# Patient Record
Sex: MALE | Race: WHITE | Employment: OTHER | URBAN - METROPOLITAN AREA
[De-identification: names, ages, dates, MRNs, and addresses within clinical notes are randomized per-mention and may not be internally consistent; named-entity substitution may affect disease eponyms.]

---

## 2018-05-19 ENCOUNTER — HOSPITAL ENCOUNTER (EMERGENCY)
Age: 58
Discharge: HOME | End: 2018-05-19
Attending: EMERGENCY MEDICINE
Payer: COMMERCIAL

## 2018-05-19 ENCOUNTER — APPOINTMENT (OUTPATIENT)
Dept: CT IMAGING | Age: 58
End: 2018-05-19
Payer: COMMERCIAL

## 2018-05-19 ENCOUNTER — APPOINTMENT (OUTPATIENT)
Dept: GENERAL RADIOLOGY | Age: 58
End: 2018-05-19
Payer: COMMERCIAL

## 2018-05-19 VITALS
WEIGHT: 255 LBS | HEART RATE: 80 BPM | OXYGEN SATURATION: 98 % | DIASTOLIC BLOOD PRESSURE: 68 MMHG | RESPIRATION RATE: 20 BRPM | SYSTOLIC BLOOD PRESSURE: 112 MMHG | BODY MASS INDEX: 36.51 KG/M2 | TEMPERATURE: 98.4 F | HEIGHT: 70 IN

## 2018-05-19 DIAGNOSIS — R10.11 RIGHT UPPER QUADRANT ABDOMINAL PAIN: ICD-10-CM

## 2018-05-19 DIAGNOSIS — K85.90 ACUTE PANCREATITIS, UNSPECIFIED COMPLICATION STATUS, UNSPECIFIED PANCREATITIS TYPE: Primary | ICD-10-CM

## 2018-05-19 DIAGNOSIS — R10.10 PAIN OF UPPER ABDOMEN: ICD-10-CM

## 2018-05-19 LAB
ALBUMIN SERPL-MCNC: 3.9 G/DL (ref 3.5–5.2)
ALP BLD-CCNC: 85 U/L (ref 40–129)
ALT SERPL-CCNC: 17 U/L (ref 0–40)
AMYLASE: 140 U/L (ref 20–100)
ANION GAP SERPL CALCULATED.3IONS-SCNC: 11 MMOL/L (ref 7–16)
AST SERPL-CCNC: 15 U/L (ref 0–39)
BASOPHILS ABSOLUTE: 0.03 E9/L (ref 0–0.2)
BASOPHILS RELATIVE PERCENT: 0.4 % (ref 0–2)
BILIRUB SERPL-MCNC: 0.5 MG/DL (ref 0–1.2)
BILIRUBIN URINE: NEGATIVE
BLOOD, URINE: NEGATIVE
BUN BLDV-MCNC: 14 MG/DL (ref 6–20)
CALCIUM SERPL-MCNC: 8.8 MG/DL (ref 8.6–10.2)
CHLORIDE BLD-SCNC: 100 MMOL/L (ref 98–107)
CLARITY: CLEAR
CO2: 26 MMOL/L (ref 22–29)
COLOR: NORMAL
CREAT SERPL-MCNC: 1 MG/DL (ref 0.7–1.2)
EKG ATRIAL RATE: 82 BPM
EKG P AXIS: 60 DEGREES
EKG P-R INTERVAL: 150 MS
EKG Q-T INTERVAL: 366 MS
EKG QRS DURATION: 92 MS
EKG QTC CALCULATION (BAZETT): 427 MS
EKG R AXIS: 39 DEGREES
EKG T AXIS: 22 DEGREES
EKG VENTRICULAR RATE: 82 BPM
EOSINOPHILS ABSOLUTE: 0.17 E9/L (ref 0.05–0.5)
EOSINOPHILS RELATIVE PERCENT: 2.1 % (ref 0–6)
GFR AFRICAN AMERICAN: >60
GFR NON-AFRICAN AMERICAN: >60 ML/MIN/1.73
GLUCOSE BLD-MCNC: 135 MG/DL (ref 74–109)
GLUCOSE URINE: NEGATIVE MG/DL
HCT VFR BLD CALC: 39.9 % (ref 37–54)
HEMOGLOBIN: 13.1 G/DL (ref 12.5–16.5)
IMMATURE GRANULOCYTES #: 0.01 E9/L
IMMATURE GRANULOCYTES %: 0.1 % (ref 0–5)
KETONES, URINE: NEGATIVE MG/DL
LACTIC ACID: 1.2 MMOL/L (ref 0.5–2.2)
LEUKOCYTE ESTERASE, URINE: NEGATIVE
LIPASE: 464 U/L (ref 13–60)
LYMPHOCYTES ABSOLUTE: 1.53 E9/L (ref 1.5–4)
LYMPHOCYTES RELATIVE PERCENT: 19.3 % (ref 20–42)
MCH RBC QN AUTO: 29.8 PG (ref 26–35)
MCHC RBC AUTO-ENTMCNC: 32.8 % (ref 32–34.5)
MCV RBC AUTO: 90.9 FL (ref 80–99.9)
MONOCYTES ABSOLUTE: 0.65 E9/L (ref 0.1–0.95)
MONOCYTES RELATIVE PERCENT: 8.2 % (ref 2–12)
NEUTROPHILS ABSOLUTE: 5.55 E9/L (ref 1.8–7.3)
NEUTROPHILS RELATIVE PERCENT: 69.9 % (ref 43–80)
NITRITE, URINE: NEGATIVE
PDW BLD-RTO: 13 FL (ref 11.5–15)
PH UA: 5.5 (ref 5–9)
PLATELET # BLD: 224 E9/L (ref 130–450)
PMV BLD AUTO: 9.1 FL (ref 7–12)
POTASSIUM SERPL-SCNC: 4.3 MMOL/L (ref 3.5–5)
PROTEIN UA: NEGATIVE MG/DL
RBC # BLD: 4.39 E12/L (ref 3.8–5.8)
SODIUM BLD-SCNC: 137 MMOL/L (ref 132–146)
SPECIFIC GRAVITY UA: 1.01 (ref 1–1.03)
TOTAL PROTEIN: 6.9 G/DL (ref 6.4–8.3)
TROPONIN: <0.01 NG/ML (ref 0–0.03)
UROBILINOGEN, URINE: 0.2 E.U./DL
WBC # BLD: 7.9 E9/L (ref 4.5–11.5)

## 2018-05-19 PROCEDURE — 80053 COMPREHEN METABOLIC PANEL: CPT

## 2018-05-19 PROCEDURE — 83690 ASSAY OF LIPASE: CPT

## 2018-05-19 PROCEDURE — 82150 ASSAY OF AMYLASE: CPT

## 2018-05-19 PROCEDURE — 99284 EMERGENCY DEPT VISIT MOD MDM: CPT

## 2018-05-19 PROCEDURE — 74177 CT ABD & PELVIS W/CONTRAST: CPT

## 2018-05-19 PROCEDURE — 93005 ELECTROCARDIOGRAM TRACING: CPT | Performed by: STUDENT IN AN ORGANIZED HEALTH CARE EDUCATION/TRAINING PROGRAM

## 2018-05-19 PROCEDURE — 83605 ASSAY OF LACTIC ACID: CPT

## 2018-05-19 PROCEDURE — 6360000004 HC RX CONTRAST MEDICATION: Performed by: EMERGENCY MEDICINE

## 2018-05-19 PROCEDURE — 81003 URINALYSIS AUTO W/O SCOPE: CPT

## 2018-05-19 PROCEDURE — 71045 X-RAY EXAM CHEST 1 VIEW: CPT

## 2018-05-19 PROCEDURE — 6370000000 HC RX 637 (ALT 250 FOR IP): Performed by: STUDENT IN AN ORGANIZED HEALTH CARE EDUCATION/TRAINING PROGRAM

## 2018-05-19 PROCEDURE — 84484 ASSAY OF TROPONIN QUANT: CPT

## 2018-05-19 PROCEDURE — 36415 COLL VENOUS BLD VENIPUNCTURE: CPT

## 2018-05-19 PROCEDURE — 85025 COMPLETE CBC W/AUTO DIFF WBC: CPT

## 2018-05-19 RX ORDER — HYDROCODONE BITARTRATE AND ACETAMINOPHEN 5; 325 MG/1; MG/1
1 TABLET ORAL EVERY 6 HOURS PRN
Qty: 10 TABLET | Refills: 0 | Status: SHIPPED | OUTPATIENT
Start: 2018-05-19 | End: 2018-05-22

## 2018-05-19 RX ORDER — FAMOTIDINE 20 MG/1
20 TABLET, FILM COATED ORAL 2 TIMES DAILY
Qty: 60 TABLET | Refills: 0 | Status: SHIPPED | OUTPATIENT
Start: 2018-05-19 | End: 2018-11-13 | Stop reason: ALTCHOICE

## 2018-05-19 RX ORDER — ESCITALOPRAM OXALATE 5 MG/1
5 TABLET ORAL DAILY
COMMUNITY
End: 2019-01-01 | Stop reason: SDUPTHER

## 2018-05-19 RX ORDER — SUCRALFATE 1 G/1
1 TABLET ORAL 4 TIMES DAILY
Qty: 60 TABLET | Refills: 0 | Status: SHIPPED | OUTPATIENT
Start: 2018-05-19 | End: 2018-11-13 | Stop reason: ALTCHOICE

## 2018-05-19 RX ADMIN — IOPAMIDOL 80 ML: 755 INJECTION, SOLUTION INTRAVENOUS at 06:08

## 2018-05-19 RX ADMIN — Medication 30 ML: at 04:50

## 2018-05-19 ASSESSMENT — ENCOUNTER SYMPTOMS
SHORTNESS OF BREATH: 0
RHINORRHEA: 0
CONSTIPATION: 1
DIARRHEA: 0
BACK PAIN: 0
SORE THROAT: 0
WHEEZING: 0
VOMITING: 0
COUGH: 0
NAUSEA: 0
CHEST TIGHTNESS: 0
ABDOMINAL PAIN: 1

## 2018-05-19 ASSESSMENT — PAIN DESCRIPTION - FREQUENCY: FREQUENCY: CONTINUOUS

## 2018-05-19 ASSESSMENT — PAIN DESCRIPTION - LOCATION
LOCATION: ABDOMEN
LOCATION: ABDOMEN

## 2018-05-19 ASSESSMENT — PAIN DESCRIPTION - PROGRESSION: CLINICAL_PROGRESSION: NOT CHANGED

## 2018-05-19 ASSESSMENT — PAIN SCALES - GENERAL
PAINLEVEL_OUTOF10: 3
PAINLEVEL_OUTOF10: 5

## 2018-05-19 ASSESSMENT — PAIN DESCRIPTION - DESCRIPTORS
DESCRIPTORS: ACHING
DESCRIPTORS: ACHING

## 2018-05-19 ASSESSMENT — PAIN DESCRIPTION - PAIN TYPE
TYPE: ACUTE PAIN
TYPE: ACUTE PAIN

## 2018-05-19 ASSESSMENT — PAIN DESCRIPTION - ORIENTATION: ORIENTATION: MID

## 2018-06-09 ENCOUNTER — HOSPITAL ENCOUNTER (OUTPATIENT)
Dept: ULTRASOUND IMAGING | Age: 58
Discharge: HOME | End: 2018-06-09
Payer: COMMERCIAL

## 2018-06-09 DIAGNOSIS — K85.80 OTHER ACUTE PANCREATITIS, UNSPECIFIED COMPLICATION STATUS: ICD-10-CM

## 2018-06-09 PROCEDURE — 76705 ECHO EXAM OF ABDOMEN: CPT

## 2018-06-25 ENCOUNTER — HOSPITAL ENCOUNTER (OUTPATIENT)
Dept: MRI IMAGING | Age: 58
Discharge: HOME | End: 2018-06-27
Payer: COMMERCIAL

## 2018-06-25 DIAGNOSIS — K85.90 ACUTE PANCREATITIS, UNSPECIFIED COMPLICATION STATUS, UNSPECIFIED PANCREATITIS TYPE: ICD-10-CM

## 2018-06-25 PROCEDURE — 6360000004 HC RX CONTRAST MEDICATION: Performed by: RADIOLOGY

## 2018-06-25 PROCEDURE — A9577 INJ MULTIHANCE: HCPCS | Performed by: RADIOLOGY

## 2018-06-25 PROCEDURE — 74183 MRI ABD W/O CNTR FLWD CNTR: CPT

## 2018-06-25 RX ADMIN — GADOBENATE DIMEGLUMINE 20 ML: 529 INJECTION, SOLUTION INTRAVENOUS at 10:01

## 2018-11-13 ENCOUNTER — HOSPITAL ENCOUNTER (INPATIENT)
Age: 58
LOS: 6 days | Discharge: HOME HEALTH | DRG: 871 | End: 2018-11-19
Attending: EMERGENCY MEDICINE | Admitting: INTERNAL MEDICINE
Payer: COMMERCIAL

## 2018-11-13 ENCOUNTER — APPOINTMENT (OUTPATIENT)
Dept: GENERAL RADIOLOGY | Age: 58
DRG: 871 | End: 2018-11-13
Payer: COMMERCIAL

## 2018-11-13 DIAGNOSIS — R50.81 NEUTROPENIA WITH FEVER (HCC): Primary | ICD-10-CM

## 2018-11-13 DIAGNOSIS — D70.9 NEUTROPENIA WITH FEVER (HCC): Primary | ICD-10-CM

## 2018-11-13 LAB
ALBUMIN SERPL-MCNC: 3.6 G/DL (ref 3.5–5.2)
ALP BLD-CCNC: 143 U/L (ref 40–129)
ALT SERPL-CCNC: 24 U/L (ref 0–40)
ANION GAP SERPL CALCULATED.3IONS-SCNC: 13 MMOL/L (ref 7–16)
AST SERPL-CCNC: 22 U/L (ref 0–39)
BASOPHILS ABSOLUTE: 0.01 E9/L (ref 0–0.2)
BASOPHILS RELATIVE PERCENT: 1 % (ref 0–2)
BILIRUB SERPL-MCNC: 0.5 MG/DL (ref 0–1.2)
BILIRUBIN URINE: NEGATIVE
BLOOD, URINE: NEGATIVE
BUN BLDV-MCNC: 7 MG/DL (ref 6–20)
CALCIUM SERPL-MCNC: 8.8 MG/DL (ref 8.6–10.2)
CHLORIDE BLD-SCNC: 100 MMOL/L (ref 98–107)
CLARITY: CLEAR
CO2: 27 MMOL/L (ref 22–29)
COLOR: YELLOW
CREAT SERPL-MCNC: 0.9 MG/DL (ref 0.7–1.2)
EOSINOPHILS ABSOLUTE: 0.02 E9/L (ref 0.05–0.5)
EOSINOPHILS RELATIVE PERCENT: 2 % (ref 0–6)
GFR AFRICAN AMERICAN: >60
GFR NON-AFRICAN AMERICAN: >60 ML/MIN/1.73
GLUCOSE BLD-MCNC: 91 MG/DL (ref 74–99)
GLUCOSE URINE: NEGATIVE MG/DL
HCT VFR BLD CALC: 36.7 % (ref 37–54)
HEMOGLOBIN: 11.8 G/DL (ref 12.5–16.5)
INFLUENZA A BY PCR: NOT DETECTED
INFLUENZA B BY PCR: NOT DETECTED
KETONES, URINE: NEGATIVE MG/DL
LACTIC ACID: 1.3 MMOL/L (ref 0.5–2.2)
LEUKOCYTE ESTERASE, URINE: NEGATIVE
LYMPHOCYTES ABSOLUTE: 0.54 E9/L (ref 1.5–4)
LYMPHOCYTES RELATIVE PERCENT: 49 % (ref 20–42)
MCH RBC QN AUTO: 27.4 PG (ref 26–35)
MCHC RBC AUTO-ENTMCNC: 32.2 % (ref 32–34.5)
MCV RBC AUTO: 85.3 FL (ref 80–99.9)
MONOCYTES ABSOLUTE: 0.18 E9/L (ref 0.1–0.95)
MONOCYTES RELATIVE PERCENT: 16 % (ref 2–12)
NEUTROPHILS ABSOLUTE: 0.35 E9/L (ref 1.8–7.3)
NEUTROPHILS RELATIVE PERCENT: 32 % (ref 43–80)
NITRITE, URINE: NEGATIVE
PDW BLD-RTO: 13.4 FL (ref 11.5–15)
PH UA: 8 (ref 5–9)
PHOSPHORUS: 1.4 MG/DL (ref 2.5–4.5)
PLATELET # BLD: 236 E9/L (ref 130–450)
PMV BLD AUTO: 8.7 FL (ref 7–12)
POIKILOCYTES: ABNORMAL
POLYCHROMASIA: ABNORMAL
POTASSIUM SERPL-SCNC: 3.4 MMOL/L (ref 3.5–5)
PROTEIN UA: NEGATIVE MG/DL
RBC # BLD: 4.3 E12/L (ref 3.8–5.8)
SODIUM BLD-SCNC: 140 MMOL/L (ref 132–146)
SPECIFIC GRAVITY UA: 1.02 (ref 1–1.03)
TARGET CELLS: ABNORMAL
TOTAL PROTEIN: 6.5 G/DL (ref 6.4–8.3)
URIC ACID, SERUM: 3.2 MG/DL (ref 3.4–7)
UROBILINOGEN, URINE: 0.2 E.U./DL
WBC # BLD: 1.1 E9/L (ref 4.5–11.5)

## 2018-11-13 PROCEDURE — 87040 BLOOD CULTURE FOR BACTERIA: CPT

## 2018-11-13 PROCEDURE — 94761 N-INVAS EAR/PLS OXIMETRY MLT: CPT

## 2018-11-13 PROCEDURE — 6370000000 HC RX 637 (ALT 250 FOR IP): Performed by: INTERNAL MEDICINE

## 2018-11-13 PROCEDURE — 83605 ASSAY OF LACTIC ACID: CPT

## 2018-11-13 PROCEDURE — 36415 COLL VENOUS BLD VENIPUNCTURE: CPT

## 2018-11-13 PROCEDURE — 2580000003 HC RX 258: Performed by: INTERNAL MEDICINE

## 2018-11-13 PROCEDURE — 2580000003 HC RX 258: Performed by: EMERGENCY MEDICINE

## 2018-11-13 PROCEDURE — 6360000002 HC RX W HCPCS: Performed by: EMERGENCY MEDICINE

## 2018-11-13 PROCEDURE — 2500000003 HC RX 250 WO HCPCS: Performed by: INTERNAL MEDICINE

## 2018-11-13 PROCEDURE — 84100 ASSAY OF PHOSPHORUS: CPT

## 2018-11-13 PROCEDURE — 96375 TX/PRO/DX INJ NEW DRUG ADDON: CPT

## 2018-11-13 PROCEDURE — 87502 INFLUENZA DNA AMP PROBE: CPT

## 2018-11-13 PROCEDURE — 80053 COMPREHEN METABOLIC PANEL: CPT

## 2018-11-13 PROCEDURE — 87088 URINE BACTERIA CULTURE: CPT

## 2018-11-13 PROCEDURE — 81003 URINALYSIS AUTO W/O SCOPE: CPT

## 2018-11-13 PROCEDURE — 71045 X-RAY EXAM CHEST 1 VIEW: CPT

## 2018-11-13 PROCEDURE — 85025 COMPLETE CBC W/AUTO DIFF WBC: CPT

## 2018-11-13 PROCEDURE — 84550 ASSAY OF BLOOD/URIC ACID: CPT

## 2018-11-13 PROCEDURE — 99285 EMERGENCY DEPT VISIT HI MDM: CPT

## 2018-11-13 PROCEDURE — 1200000000 HC SEMI PRIVATE

## 2018-11-13 PROCEDURE — 6360000002 HC RX W HCPCS: Performed by: INTERNAL MEDICINE

## 2018-11-13 PROCEDURE — 96374 THER/PROPH/DIAG INJ IV PUSH: CPT

## 2018-11-13 PROCEDURE — 6370000000 HC RX 637 (ALT 250 FOR IP): Performed by: EMERGENCY MEDICINE

## 2018-11-13 RX ORDER — NAPROXEN 500 MG/1
500 TABLET ORAL ONCE
Status: COMPLETED | OUTPATIENT
Start: 2018-11-13 | End: 2018-11-13

## 2018-11-13 RX ORDER — PANTOPRAZOLE SODIUM 40 MG/1
40 TABLET, DELAYED RELEASE ORAL DAILY
Status: ON HOLD | COMMUNITY
End: 2020-01-01 | Stop reason: HOSPADM

## 2018-11-13 RX ORDER — PROMETHAZINE HYDROCHLORIDE 25 MG/1
25 TABLET ORAL EVERY 6 HOURS PRN
Status: ON HOLD | COMMUNITY
End: 2020-01-01 | Stop reason: HOSPADM

## 2018-11-13 RX ORDER — PANTOPRAZOLE SODIUM 40 MG/1
40 TABLET, DELAYED RELEASE ORAL DAILY
Status: DISCONTINUED | OUTPATIENT
Start: 2018-11-14 | End: 2018-11-19 | Stop reason: HOSPADM

## 2018-11-13 RX ORDER — SODIUM CHLORIDE AND POTASSIUM CHLORIDE .9; .15 G/100ML; G/100ML
SOLUTION INTRAVENOUS CONTINUOUS
Status: DISCONTINUED | OUTPATIENT
Start: 2018-11-13 | End: 2018-11-18

## 2018-11-13 RX ORDER — LOPERAMIDE HYDROCHLORIDE 2 MG/1
2 CAPSULE ORAL 4 TIMES DAILY PRN
COMMUNITY
End: 2019-01-01

## 2018-11-13 RX ORDER — PROMETHAZINE HYDROCHLORIDE 25 MG/1
25 TABLET ORAL EVERY 6 HOURS PRN
Status: DISCONTINUED | OUTPATIENT
Start: 2018-11-13 | End: 2018-11-19 | Stop reason: HOSPADM

## 2018-11-13 RX ORDER — ACETAMINOPHEN 325 MG/1
650 TABLET ORAL ONCE
Status: COMPLETED | OUTPATIENT
Start: 2018-11-13 | End: 2018-11-13

## 2018-11-13 RX ORDER — ACETAMINOPHEN 325 MG/1
650 TABLET ORAL EVERY 4 HOURS PRN
Status: DISCONTINUED | OUTPATIENT
Start: 2018-11-13 | End: 2018-11-19 | Stop reason: HOSPADM

## 2018-11-13 RX ORDER — LOPERAMIDE HYDROCHLORIDE 2 MG/1
2 CAPSULE ORAL 4 TIMES DAILY PRN
Status: DISCONTINUED | OUTPATIENT
Start: 2018-11-13 | End: 2018-11-19 | Stop reason: HOSPADM

## 2018-11-13 RX ORDER — SODIUM CHLORIDE 0.9 % (FLUSH) 0.9 %
10 SYRINGE (ML) INJECTION EVERY 12 HOURS SCHEDULED
Status: DISCONTINUED | OUTPATIENT
Start: 2018-11-13 | End: 2018-11-16 | Stop reason: SDUPTHER

## 2018-11-13 RX ORDER — 0.9 % SODIUM CHLORIDE 0.9 %
1000 INTRAVENOUS SOLUTION INTRAVENOUS ONCE
Status: COMPLETED | OUTPATIENT
Start: 2018-11-13 | End: 2018-11-13

## 2018-11-13 RX ORDER — DIPHENOXYLATE HYDROCHLORIDE AND ATROPINE SULFATE 2.5; .025 MG/1; MG/1
1 TABLET ORAL 4 TIMES DAILY PRN
COMMUNITY

## 2018-11-13 RX ORDER — ESCITALOPRAM OXALATE 10 MG/1
5 TABLET ORAL DAILY
Status: DISCONTINUED | OUTPATIENT
Start: 2018-11-14 | End: 2018-11-18

## 2018-11-13 RX ORDER — DIPHENOXYLATE HYDROCHLORIDE AND ATROPINE SULFATE 2.5; .025 MG/1; MG/1
1 TABLET ORAL 4 TIMES DAILY PRN
Status: DISCONTINUED | OUTPATIENT
Start: 2018-11-13 | End: 2018-11-19 | Stop reason: HOSPADM

## 2018-11-13 RX ORDER — LIDOCAINE AND PRILOCAINE 25; 25 MG/G; MG/G
1 CREAM TOPICAL DAILY PRN
Status: ON HOLD | COMMUNITY
End: 2019-01-01 | Stop reason: ALTCHOICE

## 2018-11-13 RX ORDER — SODIUM CHLORIDE 0.9 % (FLUSH) 0.9 %
10 SYRINGE (ML) INJECTION PRN
Status: DISCONTINUED | OUTPATIENT
Start: 2018-11-13 | End: 2018-11-19 | Stop reason: HOSPADM

## 2018-11-13 RX ORDER — ONDANSETRON 2 MG/ML
4 INJECTION INTRAMUSCULAR; INTRAVENOUS EVERY 6 HOURS PRN
Status: DISCONTINUED | OUTPATIENT
Start: 2018-11-13 | End: 2018-11-15

## 2018-11-13 RX ADMIN — NAPROXEN 500 MG: 500 TABLET ORAL at 17:40

## 2018-11-13 RX ADMIN — SODIUM CHLORIDE 1000 ML: 9 INJECTION, SOLUTION INTRAVENOUS at 20:17

## 2018-11-13 RX ADMIN — SODIUM GLYCOLATE 29.98 MMOL: 216 INJECTION, SOLUTION INTRAVENOUS at 22:33

## 2018-11-13 RX ADMIN — CEFEPIME HYDROCHLORIDE 2 G: 2 INJECTION, POWDER, FOR SOLUTION INTRAVENOUS at 18:24

## 2018-11-13 RX ADMIN — SODIUM CHLORIDE 1000 ML: 9 INJECTION, SOLUTION INTRAVENOUS at 15:30

## 2018-11-13 RX ADMIN — ACETAMINOPHEN 650 MG: 325 TABLET, FILM COATED ORAL at 20:16

## 2018-11-13 RX ADMIN — ACETAMINOPHEN 650 MG: 325 TABLET, FILM COATED ORAL at 15:30

## 2018-11-13 RX ADMIN — VANCOMYCIN HYDROCHLORIDE 1500 MG: 10 INJECTION, POWDER, LYOPHILIZED, FOR SOLUTION INTRAVENOUS at 19:22

## 2018-11-13 RX ADMIN — SODIUM CHLORIDE 1000 ML: 9 INJECTION, SOLUTION INTRAVENOUS at 17:40

## 2018-11-13 RX ADMIN — POTASSIUM CHLORIDE AND SODIUM CHLORIDE: 900; 150 INJECTION, SOLUTION INTRAVENOUS at 22:32

## 2018-11-13 RX ADMIN — TAZOBACTAM SODIUM AND PIPERACILLIN SODIUM 3.38 G: 375; 3 INJECTION, SOLUTION INTRAVENOUS at 23:39

## 2018-11-13 RX ADMIN — Medication 10 ML: at 22:47

## 2018-11-13 ASSESSMENT — PAIN SCALES - GENERAL
PAINLEVEL_OUTOF10: 6
PAINLEVEL_OUTOF10: 0
PAINLEVEL_OUTOF10: 5
PAINLEVEL_OUTOF10: 0

## 2018-11-13 ASSESSMENT — ENCOUNTER SYMPTOMS
SHORTNESS OF BREATH: 0
DIARRHEA: 1
COUGH: 0
SORE THROAT: 0
VOMITING: 1
NAUSEA: 1
ABDOMINAL PAIN: 0

## 2018-11-13 ASSESSMENT — PAIN DESCRIPTION - DESCRIPTORS: DESCRIPTORS: HEADACHE

## 2018-11-13 ASSESSMENT — PAIN DESCRIPTION - PAIN TYPE: TYPE: ACUTE PAIN

## 2018-11-13 ASSESSMENT — PAIN DESCRIPTION - LOCATION: LOCATION: HEAD

## 2018-11-13 NOTE — ED PROVIDER NOTES
Patient Vitals for the past 24 hrs:   BP Temp Temp src Pulse Resp SpO2 Height Weight   11/13/18 2007 (!) 99/58 102.2 °F (39 °C) Oral 114 20 94 % - -   11/13/18 1848 115/73 100.6 °F (38.1 °C) - 105 20 96 % - -   11/13/18 1645 121/69 100.8 °F (38.2 °C) Oral 107 20 95 % - -   11/13/18 1410 112/65 102.9 °F (39.4 °C) Oral 104 18 95 % 5' 10\" (1.778 m) 201 lb (91.2 kg)       Oxygen Saturation Interpretation: Normal    ------------------------------------------ PROGRESS NOTES ------------------------------------------  ED Course as of Nov 13 2044   Tue Nov 13, 2018   1519   ATTENDING PROVIDER ATTESTATION:     I have personally performed and/or participated in the history, exam, medical decision making, and procedures and agree with all pertinent clinical information unless otherwise noted. I have also reviewed and agree with the past medical, family and social history unless otherwise noted. I have discussed this patient in detail with the resident and provided the instruction and education regarding the evidence-based evaluation and treatment of [unfilled]  History: patient presents with fever and known pancreatic cancer with leukopenia secondary to chemotherapy. He denies cough, abdominal pain or urinary symptoms. My findings: Marcus Guardado is a 65 y.o. male whom is in no distress. Physical exam reveals slightly dry mucous membranes, heart rate is slightly tachycardic, lungs CTA, abdomen is soft and nontender. No rashes or peripheral edema. Right chest port without surrounding erythema. My plan: Symptomatic and supportive care. Sepsis evaluation. Electronically signed by Solitario Mina DO on 11/13/18 at 3:19 PM        [JS]   5069 Patient reassessed. Patient states his chills have improved. Patient continues to be tachycardic. We'll order another fluid bolus. Awaiting call from oncologist.  Gordo Gordon Discussed case with Dr. Milton Huertas, oncology.  Advises broad-spectrum and admission  [MA]

## 2018-11-14 LAB
ALBUMIN SERPL-MCNC: 2.8 G/DL (ref 3.5–5.2)
ALP BLD-CCNC: 99 U/L (ref 40–129)
ALT SERPL-CCNC: 23 U/L (ref 0–40)
ANION GAP SERPL CALCULATED.3IONS-SCNC: 11 MMOL/L (ref 7–16)
ANISOCYTOSIS: ABNORMAL
AST SERPL-CCNC: 18 U/L (ref 0–39)
BASOPHILS ABSOLUTE: 0.03 E9/L (ref 0–0.2)
BASOPHILS RELATIVE PERCENT: 0.9 % (ref 0–2)
BILIRUB SERPL-MCNC: 0.7 MG/DL (ref 0–1.2)
BUN BLDV-MCNC: 10 MG/DL (ref 6–20)
CALCIUM SERPL-MCNC: 7.8 MG/DL (ref 8.6–10.2)
CHLORIDE BLD-SCNC: 101 MMOL/L (ref 98–107)
CHOLESTEROL, TOTAL: 58 MG/DL (ref 0–199)
CO2: 27 MMOL/L (ref 22–29)
CREAT SERPL-MCNC: 1.1 MG/DL (ref 0.7–1.2)
EOSINOPHILS ABSOLUTE: 0.05 E9/L (ref 0.05–0.5)
EOSINOPHILS RELATIVE PERCENT: 1.7 % (ref 0–6)
FILM ARRAY ADENOVIRUS: NORMAL
FILM ARRAY BORDETELLA PERTUSSIS: NORMAL
FILM ARRAY CHLAMYDOPHILIA PNEUMONIAE: NORMAL
FILM ARRAY CORONAVIRUS 229E: NORMAL
FILM ARRAY CORONAVIRUS HKU1: NORMAL
FILM ARRAY CORONAVIRUS NL63: NORMAL
FILM ARRAY CORONAVIRUS OC43: NORMAL
FILM ARRAY INFLUENZA A VIRUS 09H1: NORMAL
FILM ARRAY INFLUENZA A VIRUS H1: NORMAL
FILM ARRAY INFLUENZA A VIRUS H3: NORMAL
FILM ARRAY INFLUENZA A VIRUS: NORMAL
FILM ARRAY INFLUENZA B: NORMAL
FILM ARRAY METAPNEUMOVIRUS: NORMAL
FILM ARRAY MYCOPLASMA PNEUMONIAE: NORMAL
FILM ARRAY PARAINFLUENZA VIRUS 1: NORMAL
FILM ARRAY PARAINFLUENZA VIRUS 2: NORMAL
FILM ARRAY PARAINFLUENZA VIRUS 3: NORMAL
FILM ARRAY PARAINFLUENZA VIRUS 4: NORMAL
FILM ARRAY RESPIRATORY SYNCITIAL VIRUS: NORMAL
FILM ARRAY RHINOVIRUS/ENTEROVIRUS: NORMAL
GFR AFRICAN AMERICAN: >60
GFR NON-AFRICAN AMERICAN: >60 ML/MIN/1.73
GLUCOSE BLD-MCNC: 88 MG/DL (ref 74–99)
HBA1C MFR BLD: 5 % (ref 4–5.6)
HCT VFR BLD CALC: 31.4 % (ref 37–54)
HDLC SERPL-MCNC: 25 MG/DL
HEMOGLOBIN: 9.9 G/DL (ref 12.5–16.5)
LDL CHOLESTEROL CALCULATED: 23 MG/DL (ref 0–99)
LV EF: 68 %
LVEF MODALITY: NORMAL
LYMPHOCYTES ABSOLUTE: 1.15 E9/L (ref 1.5–4)
LYMPHOCYTES RELATIVE PERCENT: 40.9 % (ref 20–42)
MAGNESIUM: 1.4 MG/DL (ref 1.6–2.6)
MCH RBC QN AUTO: 27.5 PG (ref 26–35)
MCHC RBC AUTO-ENTMCNC: 31.5 % (ref 32–34.5)
MCV RBC AUTO: 87.2 FL (ref 80–99.9)
MONOCYTES ABSOLUTE: 0.31 E9/L (ref 0.1–0.95)
MONOCYTES RELATIVE PERCENT: 11.3 % (ref 2–12)
NEUTROPHILS ABSOLUTE: 1.26 E9/L (ref 1.8–7.3)
NEUTROPHILS RELATIVE PERCENT: 45.2 % (ref 43–80)
OVALOCYTES: ABNORMAL
PDW BLD-RTO: 13.6 FL (ref 11.5–15)
PHOSPHORUS: 4.2 MG/DL (ref 2.5–4.5)
PLATELET # BLD: 217 E9/L (ref 130–450)
PMV BLD AUTO: 8.7 FL (ref 7–12)
POIKILOCYTES: ABNORMAL
POLYCHROMASIA: ABNORMAL
POTASSIUM SERPL-SCNC: 3 MMOL/L (ref 3.5–5)
RBC # BLD: 3.6 E12/L (ref 3.8–5.8)
SODIUM BLD-SCNC: 139 MMOL/L (ref 132–146)
TEAR DROP CELLS: ABNORMAL
TOTAL PROTEIN: 5.3 G/DL (ref 6.4–8.3)
TRIGL SERPL-MCNC: 52 MG/DL (ref 0–149)
TSH SERPL DL<=0.05 MIU/L-ACNC: 1.21 UIU/ML (ref 0.27–4.2)
VLDLC SERPL CALC-MCNC: 10 MG/DL
WBC # BLD: 2.8 E9/L (ref 4.5–11.5)

## 2018-11-14 PROCEDURE — 87040 BLOOD CULTURE FOR BACTERIA: CPT

## 2018-11-14 PROCEDURE — 36415 COLL VENOUS BLD VENIPUNCTURE: CPT

## 2018-11-14 PROCEDURE — 6370000000 HC RX 637 (ALT 250 FOR IP): Performed by: INTERNAL MEDICINE

## 2018-11-14 PROCEDURE — 1200000000 HC SEMI PRIVATE

## 2018-11-14 PROCEDURE — 80061 LIPID PANEL: CPT

## 2018-11-14 PROCEDURE — 87581 M.PNEUMON DNA AMP PROBE: CPT

## 2018-11-14 PROCEDURE — 83036 HEMOGLOBIN GLYCOSYLATED A1C: CPT

## 2018-11-14 PROCEDURE — 93306 TTE W/DOPPLER COMPLETE: CPT

## 2018-11-14 PROCEDURE — 87486 CHLMYD PNEUM DNA AMP PROBE: CPT

## 2018-11-14 PROCEDURE — 6360000002 HC RX W HCPCS: Performed by: INTERNAL MEDICINE

## 2018-11-14 PROCEDURE — 84100 ASSAY OF PHOSPHORUS: CPT

## 2018-11-14 PROCEDURE — 87798 DETECT AGENT NOS DNA AMP: CPT

## 2018-11-14 PROCEDURE — 80053 COMPREHEN METABOLIC PANEL: CPT

## 2018-11-14 PROCEDURE — 2500000003 HC RX 250 WO HCPCS: Performed by: INTERNAL MEDICINE

## 2018-11-14 PROCEDURE — 84443 ASSAY THYROID STIM HORMONE: CPT

## 2018-11-14 PROCEDURE — 83735 ASSAY OF MAGNESIUM: CPT

## 2018-11-14 PROCEDURE — 2580000003 HC RX 258: Performed by: INTERNAL MEDICINE

## 2018-11-14 PROCEDURE — 85025 COMPLETE CBC W/AUTO DIFF WBC: CPT

## 2018-11-14 PROCEDURE — 87633 RESP VIRUS 12-25 TARGETS: CPT

## 2018-11-14 RX ORDER — DIPHENHYDRAMINE HCL 25 MG
25 TABLET ORAL EVERY 6 HOURS PRN
Status: DISCONTINUED | OUTPATIENT
Start: 2018-11-14 | End: 2018-11-19 | Stop reason: HOSPADM

## 2018-11-14 RX ORDER — HEPARIN SODIUM (PORCINE) LOCK FLUSH IV SOLN 100 UNIT/ML 100 UNIT/ML
300 SOLUTION INTRAVENOUS PRN
Status: DISCONTINUED | OUTPATIENT
Start: 2018-11-14 | End: 2018-11-19 | Stop reason: HOSPADM

## 2018-11-14 RX ADMIN — VANCOMYCIN HYDROCHLORIDE 1500 MG: 10 INJECTION, POWDER, LYOPHILIZED, FOR SOLUTION INTRAVENOUS at 19:09

## 2018-11-14 RX ADMIN — VANCOMYCIN HYDROCHLORIDE 1500 MG: 10 INJECTION, POWDER, LYOPHILIZED, FOR SOLUTION INTRAVENOUS at 07:41

## 2018-11-14 RX ADMIN — POTASSIUM CHLORIDE AND SODIUM CHLORIDE: 900; 150 INJECTION, SOLUTION INTRAVENOUS at 12:55

## 2018-11-14 RX ADMIN — DIPHENHYDRAMINE HCL 25 MG: 25 TABLET ORAL at 22:58

## 2018-11-14 RX ADMIN — TAZOBACTAM SODIUM AND PIPERACILLIN SODIUM 3.38 G: 375; 3 INJECTION, SOLUTION INTRAVENOUS at 17:30

## 2018-11-14 RX ADMIN — PANTOPRAZOLE SODIUM 40 MG: 40 TABLET, DELAYED RELEASE ORAL at 09:41

## 2018-11-14 RX ADMIN — Medication 10 ML: at 19:09

## 2018-11-14 RX ADMIN — ENOXAPARIN SODIUM 40 MG: 40 INJECTION SUBCUTANEOUS at 09:40

## 2018-11-14 RX ADMIN — ESCITALOPRAM OXALATE 5 MG: 10 TABLET ORAL at 09:41

## 2018-11-14 RX ADMIN — TAZOBACTAM SODIUM AND PIPERACILLIN SODIUM 3.38 G: 375; 3 INJECTION, SOLUTION INTRAVENOUS at 09:45

## 2018-11-14 RX ADMIN — POTASSIUM CHLORIDE AND SODIUM CHLORIDE: 900; 150 INJECTION, SOLUTION INTRAVENOUS at 22:58

## 2018-11-14 ASSESSMENT — PAIN SCALES - GENERAL
PAINLEVEL_OUTOF10: 0

## 2018-11-14 NOTE — PROGRESS NOTES
Pharmacy Consultation Note  (Antibiotic Dosing and Monitoring)      Pharmacy is following for Vancomycin dosing. Allergies:  Rivaroxaban    65 y.o. male    Ht Readings from Last 1 Encounters:   11/13/18 5' 10\" (1.778 m)     Wt Readings from Last 1 Encounters:   11/13/18 206 lb 8 oz (93.7 kg)       Recent Labs      11/13/18   1525   WBC  1.1*       Recent Labs      11/13/18   1525   BUN  7   CREATININE  0.9       Estimated Creatinine Clearance: 105 mL/min (based on SCr of 0.9 mg/dL).     No intake or output data in the 24 hours ending 11/13/18 2159    Cultures:  available culture and sensitivity results were reviewed in [facility]      Assessment:  · Consulted by Dr. Katherine Ordonez to dose/monitor vancomycin  · Estimated CrCl = 105 mL/min  · Goal trough level = 15-20 mcg/mL    Plan:  · Will initiate vancomycin at a dose of 1500 mg every 12 hours  · Monitor renal function   · Clinical pharmacy will follow up and complete full consult note      Denice Roberts 11/13/2018 9:59 PM

## 2018-11-14 NOTE — H&P
the cavoatrial junction. . LUNGS: Clear with no areas of consolidation. PLEURA: No effusions or pneumothorax. LUNG VOLUMES: Satisfactory inspirator effort. MEDIASTINAL STRUCTURES: No lymphadenopathy. Normal aortic contour. HEART SIZE: Normal. BONES AND SOFT TISSUES: No fracture or soft tissue abnormality. 1. Negative portable chest.            Assessment and Plan  Patient is a 65 y.o. male who presented with fever. The active problem list is as follows:    · Neutropenic fever  · Pancreatic adenocarcinoma s/p Whipple procedure undergoing chemotherapy  · History of positive blood cultures for E. 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 · DVT prophylaxis. lovenox  · Please see orders for further management and care.     [address] [facility] HOWARD PGYII  7:33 PM  11/13/2018

## 2018-11-14 NOTE — PROGRESS NOTES
Pharmacy Consultation Note  (Antibiotic Dosing and Monitoring)    Initial consult date: 18  Consulting physician: Dr. Vero Cool  Drug(s): Vancomycin  Indication: Neutropenic fever    Ht Readings from Last 1 Encounters:   18 5' 10\" (1.778 m)     Wt Readings from Last 1 Encounters:   18 209 lb (94.8 kg)       Pt is a 65 yom admitted with neutropenic fever    Age/  Gender IBW DW  Allergy Information   65 y.o.     male 73 kg 81.7 kg  Rivaroxaban                 Date  WBC BUN/CR UOP Drug/Dose Time   Given Level(s)   (Time) Comments     (#1) 2.8 10/1.1 -- Vancomycin 1,500 mg IV Q12H 0741  <1930>  Patient received dose last night in ED @ 1922   11/15  (#2)     <0730> <0700> Hold dose if trough is >20 mcg/mL     (#3)            (#4)            (#5)            (#6)            (#7)            Estimated Creatinine Clearance: 87 mL/min (based on SCr of 1.1 mg/dL). UOP over the past 24 hours:     No intake or output data in the 24 hours ending 18 0940    Temp max: Temp (24hrs), Av °F (38.3 °C), Min:98.6 °F (37 °C), Max:102.9 °F (39.4 °C)      Antibiotic Regimen:  Antibiotic Dose Date Initiated   Pip/tazo 3.375 g Q8H      Cultures:  available culture and sensitivity results were reviewed in EPIC  Culture Date Result    Blood cx (from [facility])  E. Faecium (pan-s)   Blood cx #1  Pending   Blood cx #2  Pending   Respiratory film array  Pending     Assessment:  · Consulted by Dr. Vero Cool to dose/monitor vancomycin  · Goal trough level:  15-20 mcg/mL  · Pt is a 65 yom with pancreatic adenocarcinoma s/p Whipple procedure, admitted with neutropenic fever. He is undergoing chemotherapy. Broad spectrum antibiotics were initiated and pharmacy was consulted to dose Vancomycin. Pop Khan has a recent history of E. Faecium bacteremia in 2018.   · Serum creatinine today: 1.1; CrCl ~ 87 mL/min; baseline Scr ~ 1.0  · Patient received Vancomycin 1,500 mg IV x 1 in ED last night @

## 2018-11-15 LAB
ALBUMIN SERPL-MCNC: 2.7 G/DL (ref 3.5–5.2)
ALP BLD-CCNC: 98 U/L (ref 40–129)
ALT SERPL-CCNC: 19 U/L (ref 0–40)
ANION GAP SERPL CALCULATED.3IONS-SCNC: 9 MMOL/L (ref 7–16)
AST SERPL-CCNC: 14 U/L (ref 0–39)
BASOPHILS ABSOLUTE: 0.04 E9/L (ref 0–0.2)
BASOPHILS RELATIVE PERCENT: 0.9 % (ref 0–2)
BILIRUB SERPL-MCNC: 0.3 MG/DL (ref 0–1.2)
BUN BLDV-MCNC: 9 MG/DL (ref 6–20)
CALCIUM SERPL-MCNC: 8.1 MG/DL (ref 8.6–10.2)
CHLORIDE BLD-SCNC: 107 MMOL/L (ref 98–107)
CO2: 27 MMOL/L (ref 22–29)
CREAT SERPL-MCNC: 0.8 MG/DL (ref 0.7–1.2)
EOSINOPHILS ABSOLUTE: 0.1 E9/L (ref 0.05–0.5)
EOSINOPHILS RELATIVE PERCENT: 2.6 % (ref 0–6)
GFR AFRICAN AMERICAN: >60
GFR NON-AFRICAN AMERICAN: >60 ML/MIN/1.73
GLUCOSE BLD-MCNC: 87 MG/DL (ref 74–99)
HCT VFR BLD CALC: 29 % (ref 37–54)
HEMOGLOBIN: 9.3 G/DL (ref 12.5–16.5)
HYPOCHROMIA: ABNORMAL
LYMPHOCYTES ABSOLUTE: 1.29 E9/L (ref 1.5–4)
LYMPHOCYTES RELATIVE PERCENT: 33.3 % (ref 20–42)
MCH RBC QN AUTO: 27.9 PG (ref 26–35)
MCHC RBC AUTO-ENTMCNC: 32.1 % (ref 32–34.5)
MCV RBC AUTO: 87.1 FL (ref 80–99.9)
MONOCYTES ABSOLUTE: 0.43 E9/L (ref 0.1–0.95)
MONOCYTES RELATIVE PERCENT: 11.4 % (ref 2–12)
NEUTROPHILS ABSOLUTE: 2.03 E9/L (ref 1.8–7.3)
NEUTROPHILS RELATIVE PERCENT: 51.8 % (ref 43–80)
PDW BLD-RTO: 13.8 FL (ref 11.5–15)
PLATELET # BLD: 234 E9/L (ref 130–450)
PMV BLD AUTO: 9 FL (ref 7–12)
POLYCHROMASIA: ABNORMAL
POTASSIUM SERPL-SCNC: 3.4 MMOL/L (ref 3.5–5)
RBC # BLD: 3.33 E12/L (ref 3.8–5.8)
SODIUM BLD-SCNC: 143 MMOL/L (ref 132–146)
TOTAL PROTEIN: 5.1 G/DL (ref 6.4–8.3)
URINE CULTURE, ROUTINE: NORMAL
VANCOMYCIN TROUGH: 10.5 MCG/ML (ref 5–16)
WBC # BLD: 3.9 E9/L (ref 4.5–11.5)

## 2018-11-15 PROCEDURE — 6370000000 HC RX 637 (ALT 250 FOR IP): Performed by: INTERNAL MEDICINE

## 2018-11-15 PROCEDURE — 80202 ASSAY OF VANCOMYCIN: CPT

## 2018-11-15 PROCEDURE — 1200000000 HC SEMI PRIVATE

## 2018-11-15 PROCEDURE — 6360000002 HC RX W HCPCS: Performed by: INTERNAL MEDICINE

## 2018-11-15 PROCEDURE — 2580000003 HC RX 258: Performed by: INTERNAL MEDICINE

## 2018-11-15 PROCEDURE — 85025 COMPLETE CBC W/AUTO DIFF WBC: CPT

## 2018-11-15 PROCEDURE — 36415 COLL VENOUS BLD VENIPUNCTURE: CPT

## 2018-11-15 PROCEDURE — 2500000003 HC RX 250 WO HCPCS: Performed by: INTERNAL MEDICINE

## 2018-11-15 PROCEDURE — 80053 COMPREHEN METABOLIC PANEL: CPT

## 2018-11-15 RX ORDER — POLYVINYL ALCOHOL 14 MG/ML
1 SOLUTION/ DROPS OPHTHALMIC EVERY 4 HOURS PRN
Status: DISCONTINUED | OUTPATIENT
Start: 2018-11-15 | End: 2018-11-19 | Stop reason: HOSPADM

## 2018-11-15 RX ADMIN — TAZOBACTAM SODIUM AND PIPERACILLIN SODIUM 3.38 G: 375; 3 INJECTION, SOLUTION INTRAVENOUS at 10:49

## 2018-11-15 RX ADMIN — Medication 10 ML: at 20:14

## 2018-11-15 RX ADMIN — Medication 10 ML: at 08:28

## 2018-11-15 RX ADMIN — PANTOPRAZOLE SODIUM 40 MG: 40 TABLET, DELAYED RELEASE ORAL at 08:28

## 2018-11-15 RX ADMIN — VANCOMYCIN HYDROCHLORIDE 1500 MG: 10 INJECTION, POWDER, LYOPHILIZED, FOR SOLUTION INTRAVENOUS at 08:28

## 2018-11-15 RX ADMIN — VANCOMYCIN HYDROCHLORIDE 1250 MG: 10 INJECTION, POWDER, LYOPHILIZED, FOR SOLUTION INTRAVENOUS at 16:06

## 2018-11-15 RX ADMIN — PROMETHAZINE HYDROCHLORIDE 25 MG: 25 TABLET ORAL at 15:36

## 2018-11-15 RX ADMIN — POTASSIUM CHLORIDE AND SODIUM CHLORIDE: 900; 150 INJECTION, SOLUTION INTRAVENOUS at 15:36

## 2018-11-15 RX ADMIN — ENOXAPARIN SODIUM 40 MG: 40 INJECTION SUBCUTANEOUS at 08:28

## 2018-11-15 RX ADMIN — TAZOBACTAM SODIUM AND PIPERACILLIN SODIUM 3.38 G: 375; 3 INJECTION, SOLUTION INTRAVENOUS at 02:53

## 2018-11-15 RX ADMIN — POLYVINYL ALCOHOL 1 DROP: 14 SOLUTION/ DROPS OPHTHALMIC at 10:49

## 2018-11-15 RX ADMIN — POTASSIUM CHLORIDE AND SODIUM CHLORIDE: 900; 150 INJECTION, SOLUTION INTRAVENOUS at 20:14

## 2018-11-15 RX ADMIN — ESCITALOPRAM OXALATE 5 MG: 10 TABLET ORAL at 08:28

## 2018-11-15 RX ADMIN — TAZOBACTAM SODIUM AND PIPERACILLIN SODIUM 3.38 G: 375; 3 INJECTION, SOLUTION INTRAVENOUS at 17:59

## 2018-11-15 ASSESSMENT — PAIN SCALES - GENERAL
PAINLEVEL_OUTOF10: 0
PAINLEVEL_OUTOF10: 0

## 2018-11-15 NOTE — PROGRESS NOTES
Internal Medicine Progress Note      Parish Brunner. Sangita Miner., & [address] Sangita Miner., F.A.C.O.I. Andie Hazel D.O. , F.A.C.O.I. Primary Care Physician: Freya Hobbs MD   Admitting Physician:  Marky Muniz DO  Admission date and time: 11/13/2018  2:02 PM    Room:  [address] [place] [address]  Admitting diagnosis: Neutropenic fever ([facility] [address] [D70.9, R50.81]  Neutropenic fever ([facility] [address] [D70.9, R50.81]    Patient Name: Manuel Patel  MRN: [id number]    Date of Service: 11/15/2018     Subjective:    Olimpia Espinoza is a 65 y. o.  male who was seen and examined today,11/15/2018, at the bedside. States he feels a little better today. C/O dry itchy eyes. No family present. Questions, answers, and tests reviewed. ROS:  Cardiovascular:   Denies any chest pain, irregular heartbeats, or palpitations. Respiratory:   Denies shortness of breath, coughing, sputum production, hemoptysis, or wheezing. Gastrointestinal:   Denies nausea, vomiting, diarrhea, or constipation. Denies any abdominal pain. Extremities:   Denies any lower extremity swelling or edema. Neurology:    Denies any headache or focal neurological deficits. No weakness or paresthesia. Derm:    Denies any rashes, ulcers, or excoriations. Denies bruising. Genitourinary:    Denies any urgency, frequency, hematuria. Voiding without difficulty. Physical Exam:    Vitals:    11/15/18 0815   BP: 106/63   Pulse: 69   Resp: 17   Temp: 98.4 °F (36.9 °C)   SpO2: 97%       HEENT:  PERRLA. EOMI. Sclera clear. Buccal mucosa moist. Impaired vision. Neck:  Supple. Trachea midline. No thyromegaly. No JVD. No bruits. Heart:  Rhythm regular, rate controlled. No murmurs. Mediport right chest-accessed. No overt signs of infection. Lungs:  Symmetrical. Clear to auscultation bilaterally. No wheezes. No rhonchi. No rales. Abdomen: Soft. Non-tender. Non-distended. Bowel sounds positive. No organomegaly or masses.   No pain on

## 2018-11-15 NOTE — PROGRESS NOTES
day, Tony Arizmendi DO, 10 mL at 11/14/18 1909    sodium chloride flush 0.9 % injection 10 mL, 10 mL, Intravenous, PRN, Laura Sky DO    magnesium hydroxide (MILK OF MAGNESIA) 400 MG/5ML suspension 30 mL, 30 mL, Oral, Daily PRN, Laura Sky DO    ondansetron [facility]) injection 4 mg, 4 mg, Intravenous, Q6H PRN, Laura Sky DO    enoxaparin (LOVENOX) injection 40 mg, 40 mg, Subcutaneous, Daily, Amanda Arizmendi DO, 40 mg at 11/14/18 0940    acetaminophen (TYLENOL) tablet 650 mg, 650 mg, Oral, Q4H PRN, Amanda Arizmendi DO    0.9% NaCl with KCl 20 mEq infusion, , Intravenous, Continuous, Amanda Arizmendi DO, Last Rate: 100 mL/hr at 11/14/18 1255    sodium glycerophosphate 14.59 mmol in dextrose 5 % 250 mL IVPB, 0.16 mmol/kg, Intravenous, PRN **OR** sodium glycerophosphate 29.98 mmol in dextrose 5 % 250 mL IVPB, 0.32 mmol/kg, Intravenous, PRN, Laura Sky DO, Stopped at 11/14/18 0433    piperacillin-tazobactam (ZOSYN) 3.375 g in dextrose 50 mL IVPB extended infusion (premix), 3.375 g, Intravenous, Q8H, Amanda Arizmendi DO, Last Rate: 12.5 mL/hr at 11/14/18 1730, 3.375 g at 11/14/18 1730    vancomycin (VANCOCIN) 1,500 mg in dextrose 5 % 300 mL IVPB, 1,500 mg, Intravenous, Q12H, Amanda rAizmendi DO, Last Rate: 200 mL/hr at 11/14/18 1909, 1,500 mg at 11/14/18 1909    Assessment:    Patient Active Problem List   Diagnosis    DDD (degenerative disc disease), lumbar    Protruded lumbar disc    Neural foraminal stenosis of lumbar spine    Lumbar radiculopathy    Degenerative Osteoarthritis of lumbar spine    Facet syndrome, lumbar    Pain of upper abdomen    Neutropenic fever ([facility] [address]       Plan:    66 yo male with pancreatic cancer s/p whipple now on adjuvant chemotherapy a/w neutropenic fever and gram negative rods ID in blood culture  --f/u culture ID and sensitivity  -cont abx and ID recs  -ANC has improved        Ritu Soto  7:47 PM  11/14/2018

## 2018-11-15 NOTE — PROGRESS NOTES
appearance: alert, appears stated age and cooperative  Throat: lips, mucosa, and tongue normal; teeth and gums normal  Lungs: clear to auscultation bilaterally  Abdomen: soft, non-tender; bowel sounds normal; no masses,  no organomegaly        Impression:     Principal Problem:    Neutropenic fever ([address]  Resolved Problems:    * No resolved hospital problems. *          Plan:      On supportive care   Will follow

## 2018-11-15 NOTE — PROGRESS NOTES
Pharmacy Consultation Note  (Antibiotic Dosing and Monitoring)    Initial consult date: 18  Consulting physician: Dr. Thomas Theodore  Drug(s): Vancomycin  Indication: Neutropenic fever    Ht Readings from Last 1 Encounters:   18 5' 10\" (1.778 m)     Wt Readings from Last 1 Encounters:   18 209 lb (94.8 kg)       Pt is a 65 yom admitted with neutropenic fever    Age/  Gender IBW DW  Allergy Information   65 y.o.     male 73 kg 81.7 kg  Rivaroxaban                 Date  WBC BUN/CR UOP Drug/Dose Time   Given Level(s)   (Time) Comments     (#1) 2.8 10/1.1 -- Vancomycin 1,500 mg IV Q12H 0741  1909  Patient received dose last night in ED @ 1922   11/15  (#2) 3.9 9/0.8 -- Vancomycin 1,500 mg IV Q12H  <Vancomycin 1,250 mg IV Q8H> 0828  <1700> 10.5 mcg/mL @ 0700      (#3)            (#4)            (#5)            (#6)            (#7)            Estimated Creatinine Clearance: 119 mL/min (based on SCr of 0.8 mg/dL). UOP over the past 24 hours:       Intake/Output Summary (Last 24 hours) at 11/15/18 1212  Last data filed at 11/15/18 0930   Gross per 24 hour   Intake             2564 ml   Output                0 ml   Net             2564 ml       Temp max: Temp (24hrs), Av °F (37.2 °C), Min:98.4 °F (36.9 °C), Max:99.4 °F (37.4 °C)      Antibiotic Regimen:  Antibiotic Dose Date Initiated   Pip/tazo 3.375 g Q8H      Cultures:  available culture and sensitivity results were reviewed in EPIC  Culture Date Result    Blood cx (from [facility])  E. Faecium (pan-s)   Urine  Gram positives <10,000 cfu   Blood cx #1  NGTD   Blood cx #2  Anaerobic gram positive alton   Respiratory film array  Negative   Blood cx #1  Pending   Blood cx #2  Pending     Assessment:  · Consulted by Dr. Thomas Theodore to dose/monitor vancomycin  · Goal trough level:  15-20 mcg/mL  · Pt is a 65 yom with pancreatic adenocarcinoma s/p Whipple procedure, admitted with neutropenic fever. He is undergoing chemotherapy. Broad spectrum antibiotics were initiated and pharmacy was consulted to dose Vancomycin. Carly Crook has a recent history of E. Faecium bacteremia in 9/2018.   · Serum creatinine today: 0.8; CrCl ~ 87 mL/min; baseline Scr ~ 1.0  · 11/15: trough subtherapeutic this morning @ 10.5 mcg/mL    Plan:  · Adjust dose to Vancomycin 1,250 mg IV Q8H  · Check trough on new regimen  · Follow renal function  · Pharmacist will follow and monitor/adjust dosing as necessary      Thank you for the consult,    Eris García, PharmD, BCPS 11/15/2018 12:17 PM   [phone]

## 2018-11-16 ENCOUNTER — ANESTHESIA (OUTPATIENT)
Dept: ENDOSCOPY | Age: 58
DRG: 871 | End: 2018-11-16
Payer: COMMERCIAL

## 2018-11-16 ENCOUNTER — ANESTHESIA EVENT (OUTPATIENT)
Dept: ENDOSCOPY | Age: 58
DRG: 871 | End: 2018-11-16
Payer: COMMERCIAL

## 2018-11-16 ENCOUNTER — APPOINTMENT (OUTPATIENT)
Dept: ULTRASOUND IMAGING | Age: 58
DRG: 871 | End: 2018-11-16
Payer: COMMERCIAL

## 2018-11-16 ENCOUNTER — APPOINTMENT (OUTPATIENT)
Dept: CT IMAGING | Age: 58
DRG: 871 | End: 2018-11-16
Payer: COMMERCIAL

## 2018-11-16 VITALS
SYSTOLIC BLOOD PRESSURE: 118 MMHG | OXYGEN SATURATION: 97 % | RESPIRATION RATE: 22 BRPM | DIASTOLIC BLOOD PRESSURE: 75 MMHG

## 2018-11-16 LAB
ALBUMIN SERPL-MCNC: 2.6 G/DL (ref 3.5–5.2)
ALP BLD-CCNC: 112 U/L (ref 40–129)
ALT SERPL-CCNC: 20 U/L (ref 0–40)
ANION GAP SERPL CALCULATED.3IONS-SCNC: 9 MMOL/L (ref 7–16)
AST SERPL-CCNC: 15 U/L (ref 0–39)
BASOPHILS ABSOLUTE: 0.04 E9/L (ref 0–0.2)
BASOPHILS RELATIVE PERCENT: 0.9 % (ref 0–2)
BILIRUB SERPL-MCNC: <0.2 MG/DL (ref 0–1.2)
BUN BLDV-MCNC: 6 MG/DL (ref 6–20)
BURR CELLS: ABNORMAL
CALCIUM SERPL-MCNC: 8.2 MG/DL (ref 8.6–10.2)
CHLORIDE BLD-SCNC: 106 MMOL/L (ref 98–107)
CO2: 26 MMOL/L (ref 22–29)
CREAT SERPL-MCNC: 0.8 MG/DL (ref 0.7–1.2)
EOSINOPHILS ABSOLUTE: 0.11 E9/L (ref 0.05–0.5)
EOSINOPHILS RELATIVE PERCENT: 2.6 % (ref 0–6)
GFR AFRICAN AMERICAN: >60
GFR NON-AFRICAN AMERICAN: >60 ML/MIN/1.73
GLUCOSE BLD-MCNC: 80 MG/DL (ref 74–99)
HCT VFR BLD CALC: 30.2 % (ref 37–54)
HEMOGLOBIN: 9.7 G/DL (ref 12.5–16.5)
LYMPHOCYTES ABSOLUTE: 1.16 E9/L (ref 1.5–4)
LYMPHOCYTES RELATIVE PERCENT: 27 % (ref 20–42)
MCH RBC QN AUTO: 28 PG (ref 26–35)
MCHC RBC AUTO-ENTMCNC: 32.1 % (ref 32–34.5)
MCV RBC AUTO: 87 FL (ref 80–99.9)
MONOCYTES ABSOLUTE: 0.17 E9/L (ref 0.1–0.95)
MONOCYTES RELATIVE PERCENT: 4.3 % (ref 2–12)
NEUTROPHILS ABSOLUTE: 2.8 E9/L (ref 1.8–7.3)
NEUTROPHILS RELATIVE PERCENT: 65.2 % (ref 43–80)
OVALOCYTES: ABNORMAL
PDW BLD-RTO: 14 FL (ref 11.5–15)
PLATELET # BLD: 289 E9/L (ref 130–450)
PMV BLD AUTO: 9.1 FL (ref 7–12)
POIKILOCYTES: ABNORMAL
POLYCHROMASIA: ABNORMAL
POTASSIUM SERPL-SCNC: 4 MMOL/L (ref 3.5–5)
RBC # BLD: 3.47 E12/L (ref 3.8–5.8)
SCHISTOCYTES: ABNORMAL
SODIUM BLD-SCNC: 141 MMOL/L (ref 132–146)
TOTAL PROTEIN: 5.4 G/DL (ref 6.4–8.3)
URIC ACID, SERUM: 2 MG/DL (ref 3.4–7)
VACUOLATED NEUTROPHILS: ABNORMAL
WBC # BLD: 4.3 E9/L (ref 4.5–11.5)

## 2018-11-16 PROCEDURE — 84550 ASSAY OF BLOOD/URIC ACID: CPT

## 2018-11-16 PROCEDURE — 93325 DOPPLER ECHO COLOR FLOW MAPG: CPT

## 2018-11-16 PROCEDURE — 85025 COMPLETE CBC W/AUTO DIFF WBC: CPT

## 2018-11-16 PROCEDURE — 94761 N-INVAS EAR/PLS OXIMETRY MLT: CPT

## 2018-11-16 PROCEDURE — 2580000003 HC RX 258: Performed by: SPECIALIST

## 2018-11-16 PROCEDURE — 80053 COMPREHEN METABOLIC PANEL: CPT

## 2018-11-16 PROCEDURE — 3700000001 HC ADD 15 MINUTES (ANESTHESIA): Performed by: INTERNAL MEDICINE

## 2018-11-16 PROCEDURE — 6360000002 HC RX W HCPCS: Performed by: NURSE ANESTHETIST, CERTIFIED REGISTERED

## 2018-11-16 PROCEDURE — 6360000002 HC RX W HCPCS: Performed by: INTERNAL MEDICINE

## 2018-11-16 PROCEDURE — 93312 ECHO TRANSESOPHAGEAL: CPT

## 2018-11-16 PROCEDURE — 2580000003 HC RX 258: Performed by: NURSE ANESTHETIST, CERTIFIED REGISTERED

## 2018-11-16 PROCEDURE — 3700000000 HC ANESTHESIA ATTENDED CARE: Performed by: INTERNAL MEDICINE

## 2018-11-16 PROCEDURE — 7100000011 HC PHASE II RECOVERY - ADDTL 15 MIN: Performed by: INTERNAL MEDICINE

## 2018-11-16 PROCEDURE — 2580000003 HC RX 258: Performed by: INTERNAL MEDICINE

## 2018-11-16 PROCEDURE — 36415 COLL VENOUS BLD VENIPUNCTURE: CPT

## 2018-11-16 PROCEDURE — 93971 EXTREMITY STUDY: CPT

## 2018-11-16 PROCEDURE — 2709999900 HC NON-CHARGEABLE SUPPLY: Performed by: INTERNAL MEDICINE

## 2018-11-16 PROCEDURE — 2500000003 HC RX 250 WO HCPCS: Performed by: INTERNAL MEDICINE

## 2018-11-16 PROCEDURE — 74177 CT ABD & PELVIS W/CONTRAST: CPT

## 2018-11-16 PROCEDURE — 6360000002 HC RX W HCPCS: Performed by: SPECIALIST

## 2018-11-16 PROCEDURE — 7100000010 HC PHASE II RECOVERY - FIRST 15 MIN: Performed by: INTERNAL MEDICINE

## 2018-11-16 PROCEDURE — 6360000004 HC RX CONTRAST MEDICATION: Performed by: RADIOLOGY

## 2018-11-16 PROCEDURE — 1200000000 HC SEMI PRIVATE

## 2018-11-16 PROCEDURE — 2500000003 HC RX 250 WO HCPCS: Performed by: SPECIALIST

## 2018-11-16 RX ORDER — PROPOFOL 10 MG/ML
INJECTION, EMULSION INTRAVENOUS PRN
Status: DISCONTINUED | OUTPATIENT
Start: 2018-11-16 | End: 2018-11-16 | Stop reason: SDUPTHER

## 2018-11-16 RX ORDER — SODIUM CHLORIDE 0.9 % (FLUSH) 0.9 %
10 SYRINGE (ML) INJECTION PRN
Status: DISCONTINUED | OUTPATIENT
Start: 2018-11-16 | End: 2018-11-19 | Stop reason: HOSPADM

## 2018-11-16 RX ORDER — SODIUM CHLORIDE 9 MG/ML
INJECTION, SOLUTION INTRAVENOUS CONTINUOUS PRN
Status: DISCONTINUED | OUTPATIENT
Start: 2018-11-16 | End: 2018-11-16 | Stop reason: SDUPTHER

## 2018-11-16 RX ORDER — SODIUM CHLORIDE 0.9 % (FLUSH) 0.9 %
10 SYRINGE (ML) INJECTION EVERY 12 HOURS SCHEDULED
Status: DISCONTINUED | OUTPATIENT
Start: 2018-11-16 | End: 2018-11-19 | Stop reason: HOSPADM

## 2018-11-16 RX ADMIN — POTASSIUM CHLORIDE AND SODIUM CHLORIDE: 900; 150 INJECTION, SOLUTION INTRAVENOUS at 16:33

## 2018-11-16 RX ADMIN — POTASSIUM CHLORIDE AND SODIUM CHLORIDE: 900; 150 INJECTION, SOLUTION INTRAVENOUS at 06:06

## 2018-11-16 RX ADMIN — TAZOBACTAM SODIUM AND PIPERACILLIN SODIUM 3.38 G: 375; 3 INJECTION, SOLUTION INTRAVENOUS at 03:26

## 2018-11-16 RX ADMIN — ENOXAPARIN SODIUM 100 MG: 100 INJECTION SUBCUTANEOUS at 18:07

## 2018-11-16 RX ADMIN — TAZOBACTAM SODIUM AND PIPERACILLIN SODIUM 3.38 G: 375; 3 INJECTION, SOLUTION INTRAVENOUS at 11:27

## 2018-11-16 RX ADMIN — POLYVINYL ALCOHOL 1 DROP: 14 SOLUTION/ DROPS OPHTHALMIC at 11:36

## 2018-11-16 RX ADMIN — IOHEXOL 50 ML: 240 INJECTION, SOLUTION INTRATHECAL; INTRAVASCULAR; INTRAVENOUS; ORAL at 17:26

## 2018-11-16 RX ADMIN — SODIUM CHLORIDE: 9 INJECTION, SOLUTION INTRAVENOUS at 14:18

## 2018-11-16 RX ADMIN — VANCOMYCIN HYDROCHLORIDE 1250 MG: 10 INJECTION, POWDER, LYOPHILIZED, FOR SOLUTION INTRAVENOUS at 01:31

## 2018-11-16 RX ADMIN — VANCOMYCIN HYDROCHLORIDE 1250 MG: 10 INJECTION, POWDER, LYOPHILIZED, FOR SOLUTION INTRAVENOUS at 09:53

## 2018-11-16 RX ADMIN — Medication 10 ML: at 11:28

## 2018-11-16 RX ADMIN — AMPICILLIN SODIUM AND SULBACTAM SODIUM 3 G: 2; 1 INJECTION, POWDER, FOR SOLUTION INTRAMUSCULAR; INTRAVENOUS at 16:33

## 2018-11-16 RX ADMIN — IOPAMIDOL 80 ML: 755 INJECTION, SOLUTION INTRAVENOUS at 17:27

## 2018-11-16 RX ADMIN — PROPOFOL 300 MG: 10 INJECTION, EMULSION INTRAVENOUS at 14:30

## 2018-11-16 ASSESSMENT — PAIN SCALES - GENERAL
PAINLEVEL_OUTOF10: 0

## 2018-11-16 NOTE — PROGRESS NOTES
[facility]- accessed     Laboratory and Tests Review:  Lab Results   Component Value Date    WBC 4.3 (L) 11/16/2018    WBC 3.9 (L) 11/15/2018    WBC 2.8 (L) 11/14/2018    HGB 9.7 (L) 11/16/2018    HCT 30.2 (L) 11/16/2018    MCV 87.0 11/16/2018     11/16/2018     Lab Results   Component Value Date    NEUTROABS 2.80 11/16/2018    NEUTROABS 2.03 11/15/2018    NEUTROABS 1.26 (L) 11/14/2018     Lab Results   Component Value Date    ALT 20 11/16/2018    AST 15 11/16/2018    ALKPHOS 112 11/16/2018    BILITOT <0.2 11/16/2018     Lab Results   Component Value Date     11/16/2018    K 4.0 11/16/2018     11/16/2018    CO2 26 11/16/2018    BUN 6 11/16/2018    CREATININE 0.8 11/16/2018    GFRAA >60 11/16/2018    LABGLOM >60 11/16/2018    GLUCOSE 80 11/16/2018    PROT 5.4 11/16/2018    LABALBU 2.6 11/16/2018    CALCIUM 8.2 11/16/2018    BILITOT <0.2 11/16/2018    ALKPHOS 112 11/16/2018    AST 15 11/16/2018    ALT 20 11/16/2018     Radiology:  Reviewed     Microbiology:   11/13/18-Blood cx- GP rods-  Anaerobic gram positive alton  11/14/18-blood cx- pending   Urine cx- GPO    ASSESSMENT:  · GPR bacteremia/anaerobic  · H/O Enterococcus septicemia 9/20/18  · Neutropenic fever  · Pancreatic cancer- on chemotherapy- s/p whipple procedure    PLAN:  · Change to unasyn  · CT a/p   · Check cultures  · AVINASH  · Monitor labs    Brooks Martin MD  11:23 AM  11/16/2018  Phone [phone]  Fax [phone]

## 2018-11-16 NOTE — PROGRESS NOTES
murmurs. Mediport right chest-accessed. No overt signs of infection. Lungs:  Symmetrical. Clear to auscultation bilaterally. No wheezes. No rhonchi. No rales. Abdomen: Soft. Non-tender. Non-distended. Bowel sounds positive. No organomegaly or masses. No pain on palpation    Extremities:  Peripheral pulses present. Mild swelling of the right forearm/hand noted. .  No ulcers. Neurologic:  Alert x 3. No focal deficit. Cranial nerves grossly intact. Skin:  No petechia. No hemorrhage. No wounds. CBC with Differential:    Lab Results   Component Value Date    WBC 4.3 11/16/2018    RBC 3.47 11/16/2018    HGB 9.7 11/16/2018    HCT 30.2 11/16/2018     11/16/2018    MCV 87.0 11/16/2018    MCH 28.0 11/16/2018    MCHC 32.1 11/16/2018    RDW 14.0 11/16/2018    SEGSPCT 85 12/20/2012    LYMPHOPCT 27.0 11/16/2018    MONOPCT 4.3 11/16/2018    BASOPCT 0.9 11/16/2018    MONOSABS 0.17 11/16/2018    LYMPHSABS 1.16 11/16/2018    EOSABS 0.11 11/16/2018    BASOSABS 0.04 11/16/2018     BMP:    Lab Results   Component Value Date     11/16/2018    K 4.0 11/16/2018     11/16/2018    CO2 26 11/16/2018    BUN 6 11/16/2018    LABALBU 2.6 11/16/2018    CREATININE 0.8 11/16/2018    CALCIUM 8.2 11/16/2018    GFRAA >60 11/16/2018    LABGLOM >60 11/16/2018    GLUCOSE 80 11/16/2018       Other Data:      Intake/Output Summary (Last 24 hours) at 11/16/18 1114  Last data filed at 11/16/18 1023   Gross per 24 hour   Intake             1560 ml   Output                0 ml   Net             1560 ml         Scheduled Medications:   sodium chloride flush  10 mL Intravenous 2 times per day    vancomycin  1,250 mg Intravenous Q8H    escitalopram  5 mg Oral Daily    pantoprazole  40 mg Oral Daily    enoxaparin  40 mg Subcutaneous Daily    piperacillin-tazobactam  3.375 g Intravenous Q8H         Infusion Medications:   0.9% NaCl with KCl 20 mEq 100 mL/hr at 11/16/18 0606       Assessment:   1.  Neutropenic fever in

## 2018-11-16 NOTE — CONSULTS
Cardiology consult  Dr. Moo Garsia      Reason for Consult: Bacteremia, AVINASH to rule out endocarditis  Requesting Physician: Nicki Olson MD  CHIEF COMPLAINT: None at this time   History Obtained From: patient, electronic medical record  HISTORY OF PRESENT ILLNESS:   Patient is a 65year old with a medical history of pancreatic cancer currently undergoing chemotherapy and sleep apnea. Patient was admitted to the hospital with neutropenic fever and was found to have positive blood cultures, cardiology was consulted. At this time patient reports he is feeling fine and is asymptomatic. Patient denies any chest pain, no shortness of breath, no lightheadedness, no dizziness, no palpitations, no pedal edema, no PND, no orthopnea, no syncope, no presyncopal episodes.       Past Medical History:   Diagnosis Date    Anticoagulant long-term use     Anxiety     Back pain     Cancer ([facility] [address] 08/2018    pancreatic cancer    DDD (degenerative disc disease), lumbar 10/21/2013    DVT     Neutropenic fever ([facility] [address] 11/13/2018    Panic attacks     Sleep apnea     Unspecified sleep apnea        Past Surgical History:   Procedure Laterality Date    KNEE ARTHROSCOPY  1987    NERVE BLOCK  11/25/13    transforaminal nerve right lumbar #1    NERVE BLOCK Right 12/011/13    lumbar transforaminal #2    NERVE BLOCK Right 12/18/13    transforaminal nerve block, lumbar #3    NERVE BLOCK  02/07/14    transforaminal nerve block left lumbar #1    PANCREAS SURGERY      whipple procedure 9/14/2018    ROTATOR CUFF REPAIR           Current Facility-Administered Medications:     sodium chloride flush 0.9 % injection 10 mL, 10 mL, Intravenous, 2 times per day, Moo Garsia MD, 10 mL at 11/16/18 1128    sodium chloride flush 0.9 % injection 10 mL, 10 mL, Intravenous, PRN, Moo Garsia MD    ampicillin-sulbactam (UNASYN) 3 g ivpb minibag, 3 g, Intravenous, Q6H, Nicki Olson MD    piperacillin-tazobactam (ZOSYN) 3.375 g in dextrose 50 mL IVPB extended infusion (premix), 3.375 g, Intravenous, Once, Korin Brooks MD, Last Rate: 12.5 mL/hr at 11/16/18 1127, 3.375 g at 11/16/18 1127    polyvinyl alcohol (LIQUIFILM TEARS) 1.4 % ophthalmic solution 1 drop, 1 drop, Both Eyes, Q4H PRN, Kip Werner, DO, 1 drop at 11/16/18 1136    heparin flush 100 UNIT/ML injection 300 Units, 300 Units, Intercatheter, PRN, Wandra Dross, DO    diphenhydrAMINE (BENADRYL) tablet 25 mg, 25 mg, Oral, Q6H PRN, Tray Dillon, DO, 25 mg at 11/14/18 2258    diphenoxylate-atropine (LOMOTIL) 2.5-0.025 MG per tablet 1 tablet, 1 tablet, Oral, 4x Daily PRN, Kenneth Lyons,     escitalopram (LEXAPRO) tablet 5 mg, 5 mg, Oral, Daily, Amanda Arizmendi DO, 5 mg at 11/15/18 0612    loperamide (IMODIUM) capsule 2 mg, 2 mg, Oral, 4x Daily PRN, Amanda Arizmendi DO    pantoprazole (PROTONIX) tablet 40 mg, 40 mg, Oral, Daily, Amanda Arizmendi DO, 40 mg at 11/15/18 5450    promethazine (PHENERGAN) tablet 25 mg, 25 mg, Oral, Q6H PRN, Amanda Arizmendi DO, 25 mg at 11/15/18 1536    sodium chloride flush 0.9 % injection 10 mL, 10 mL, Intravenous, PRN, Kenneth Lyons DO    magnesium hydroxide (MILK OF MAGNESIA) 400 MG/5ML suspension 30 mL, 30 mL, Oral, Daily PRN, Kenneth Lyons DO    enoxaparin (LOVENOX) injection 40 mg, 40 mg, Subcutaneous, Daily, Amanda Arizmendi DO, 40 mg at 11/15/18 5764    acetaminophen (TYLENOL) tablet 650 mg, 650 mg, Oral, Q4H PRN, Amanda Arizmendi DO    0.9% NaCl with KCl 20 mEq infusion, , Intravenous, Continuous, Amanda Arizmendi DO, Last Rate: 100 mL/hr at 11/16/18 0606    sodium glycerophosphate 14.59 mmol in dextrose 5 % 250 mL IVPB, 0.16 mmol/kg, Intravenous, PRN **OR** sodium glycerophosphate 29.98 mmol in dextrose 5 % 250 mL IVPB, 0.32 mmol/kg, Intravenous, PRN, Kenneth Lyons DO, Stopped at 11/14/18 0433    Allergies as of 11/13/2018 - Review Complete 11/13/2018   Allergen Reaction Noted    Rivaroxaban  06/26/2015

## 2018-11-17 LAB
ALBUMIN SERPL-MCNC: 3 G/DL (ref 3.5–5.2)
ALP BLD-CCNC: 123 U/L (ref 40–129)
ALT SERPL-CCNC: 20 U/L (ref 0–40)
ANION GAP SERPL CALCULATED.3IONS-SCNC: 7 MMOL/L (ref 7–16)
AST SERPL-CCNC: 15 U/L (ref 0–39)
BASOPHILS ABSOLUTE: 0.03 E9/L (ref 0–0.2)
BASOPHILS RELATIVE PERCENT: 0.8 % (ref 0–2)
BILIRUB SERPL-MCNC: <0.2 MG/DL (ref 0–1.2)
BUN BLDV-MCNC: 4 MG/DL (ref 6–20)
CALCIUM SERPL-MCNC: 8.5 MG/DL (ref 8.6–10.2)
CHLORIDE BLD-SCNC: 105 MMOL/L (ref 98–107)
CO2: 29 MMOL/L (ref 22–29)
CREAT SERPL-MCNC: 0.8 MG/DL (ref 0.7–1.2)
EOSINOPHILS ABSOLUTE: 0.07 E9/L (ref 0.05–0.5)
EOSINOPHILS RELATIVE PERCENT: 1.9 % (ref 0–6)
GFR AFRICAN AMERICAN: >60
GFR NON-AFRICAN AMERICAN: >60 ML/MIN/1.73
GLUCOSE BLD-MCNC: 79 MG/DL (ref 74–99)
HCT VFR BLD CALC: 33.1 % (ref 37–54)
HEMOGLOBIN: 10.5 G/DL (ref 12.5–16.5)
IMMATURE GRANULOCYTES #: 0.04 E9/L
IMMATURE GRANULOCYTES %: 1.1 % (ref 0–5)
INR BLD: 1.4
LYMPHOCYTES ABSOLUTE: 1.3 E9/L (ref 1.5–4)
LYMPHOCYTES RELATIVE PERCENT: 34.9 % (ref 20–42)
MCH RBC QN AUTO: 27.7 PG (ref 26–35)
MCHC RBC AUTO-ENTMCNC: 31.7 % (ref 32–34.5)
MCV RBC AUTO: 87.3 FL (ref 80–99.9)
MONOCYTES ABSOLUTE: 0.41 E9/L (ref 0.1–0.95)
MONOCYTES RELATIVE PERCENT: 11 % (ref 2–12)
NEUTROPHILS ABSOLUTE: 1.88 E9/L (ref 1.8–7.3)
NEUTROPHILS RELATIVE PERCENT: 50.3 % (ref 43–80)
PDW BLD-RTO: 13.8 FL (ref 11.5–15)
PLATELET # BLD: 322 E9/L (ref 130–450)
PMV BLD AUTO: 8.7 FL (ref 7–12)
POTASSIUM SERPL-SCNC: 4.3 MMOL/L (ref 3.5–5)
PROTHROMBIN TIME: 15.2 SEC (ref 9.3–12.4)
RBC # BLD: 3.79 E12/L (ref 3.8–5.8)
SODIUM BLD-SCNC: 141 MMOL/L (ref 132–146)
TOTAL PROTEIN: 5.8 G/DL (ref 6.4–8.3)
WBC # BLD: 3.7 E9/L (ref 4.5–11.5)

## 2018-11-17 PROCEDURE — 85025 COMPLETE CBC W/AUTO DIFF WBC: CPT

## 2018-11-17 PROCEDURE — 6370000000 HC RX 637 (ALT 250 FOR IP): Performed by: INTERNAL MEDICINE

## 2018-11-17 PROCEDURE — 1200000000 HC SEMI PRIVATE

## 2018-11-17 PROCEDURE — 85610 PROTHROMBIN TIME: CPT

## 2018-11-17 PROCEDURE — 6360000002 HC RX W HCPCS: Performed by: INTERNAL MEDICINE

## 2018-11-17 PROCEDURE — 80053 COMPREHEN METABOLIC PANEL: CPT

## 2018-11-17 PROCEDURE — 36415 COLL VENOUS BLD VENIPUNCTURE: CPT

## 2018-11-17 PROCEDURE — 2580000003 HC RX 258: Performed by: SPECIALIST

## 2018-11-17 PROCEDURE — 2580000003 HC RX 258: Performed by: INTERNAL MEDICINE

## 2018-11-17 PROCEDURE — 94761 N-INVAS EAR/PLS OXIMETRY MLT: CPT

## 2018-11-17 PROCEDURE — 6360000002 HC RX W HCPCS: Performed by: SPECIALIST

## 2018-11-17 RX ORDER — AMPICILLIN AND SULBACTAM 2; 1 G/1; G/1
3 INJECTION, POWDER, FOR SOLUTION INTRAMUSCULAR; INTRAVENOUS EVERY 6 HOURS
Qty: 40 EACH | Refills: 0 | Status: SHIPPED | OUTPATIENT
Start: 2018-11-17 | End: 2018-11-19

## 2018-11-17 RX ORDER — LORAZEPAM 0.5 MG/1
0.5 TABLET ORAL EVERY 6 HOURS PRN
Status: DISCONTINUED | OUTPATIENT
Start: 2018-11-17 | End: 2018-11-19 | Stop reason: HOSPADM

## 2018-11-17 RX ORDER — WARFARIN SODIUM 5 MG/1
5 TABLET ORAL DAILY
Status: DISCONTINUED | OUTPATIENT
Start: 2018-11-17 | End: 2018-11-19 | Stop reason: HOSPADM

## 2018-11-17 RX ADMIN — PANCRELIPASE 1 CAPSULE: 60000; 12000; 38000 CAPSULE, DELAYED RELEASE PELLETS ORAL at 12:28

## 2018-11-17 RX ADMIN — ENOXAPARIN SODIUM 100 MG: 100 INJECTION SUBCUTANEOUS at 19:53

## 2018-11-17 RX ADMIN — AMPICILLIN SODIUM AND SULBACTAM SODIUM 3 G: 2; 1 INJECTION, POWDER, FOR SOLUTION INTRAMUSCULAR; INTRAVENOUS at 06:51

## 2018-11-17 RX ADMIN — ESCITALOPRAM OXALATE 5 MG: 10 TABLET ORAL at 09:23

## 2018-11-17 RX ADMIN — LORAZEPAM 0.5 MG: 0.5 TABLET ORAL at 17:22

## 2018-11-17 RX ADMIN — DIPHENHYDRAMINE HCL 25 MG: 25 TABLET ORAL at 01:06

## 2018-11-17 RX ADMIN — POTASSIUM CHLORIDE AND SODIUM CHLORIDE: 900; 150 INJECTION, SOLUTION INTRAVENOUS at 03:24

## 2018-11-17 RX ADMIN — LORAZEPAM 0.5 MG: 0.5 TABLET ORAL at 23:39

## 2018-11-17 RX ADMIN — LORAZEPAM 0.5 MG: 0.5 TABLET ORAL at 10:43

## 2018-11-17 RX ADMIN — PANTOPRAZOLE SODIUM 40 MG: 40 TABLET, DELAYED RELEASE ORAL at 09:23

## 2018-11-17 RX ADMIN — WARFARIN SODIUM 5 MG: 5 TABLET ORAL at 18:05

## 2018-11-17 RX ADMIN — Medication 10 ML: at 09:27

## 2018-11-17 RX ADMIN — AMPICILLIN SODIUM AND SULBACTAM SODIUM 3 G: 2; 1 INJECTION, POWDER, FOR SOLUTION INTRAMUSCULAR; INTRAVENOUS at 00:50

## 2018-11-17 RX ADMIN — AMPICILLIN SODIUM AND SULBACTAM SODIUM 3 G: 2; 1 INJECTION, POWDER, FOR SOLUTION INTRAMUSCULAR; INTRAVENOUS at 12:29

## 2018-11-17 RX ADMIN — PANCRELIPASE 1 CAPSULE: 60000; 12000; 38000 CAPSULE, DELAYED RELEASE PELLETS ORAL at 17:22

## 2018-11-17 RX ADMIN — ENOXAPARIN SODIUM 100 MG: 100 INJECTION SUBCUTANEOUS at 06:45

## 2018-11-17 RX ADMIN — AMPICILLIN SODIUM AND SULBACTAM SODIUM 3 G: 2; 1 INJECTION, POWDER, FOR SOLUTION INTRAMUSCULAR; INTRAVENOUS at 17:22

## 2018-11-17 ASSESSMENT — PAIN SCALES - GENERAL
PAINLEVEL_OUTOF10: 0

## 2018-11-18 LAB
ALBUMIN SERPL-MCNC: 3.1 G/DL (ref 3.5–5.2)
ALP BLD-CCNC: 132 U/L (ref 40–129)
ALT SERPL-CCNC: 19 U/L (ref 0–40)
ANION GAP SERPL CALCULATED.3IONS-SCNC: 7 MMOL/L (ref 7–16)
AST SERPL-CCNC: 15 U/L (ref 0–39)
BASOPHILS ABSOLUTE: 0.05 E9/L (ref 0–0.2)
BASOPHILS RELATIVE PERCENT: 1 % (ref 0–2)
BILIRUB SERPL-MCNC: <0.2 MG/DL (ref 0–1.2)
BLOOD CULTURE, ROUTINE: NORMAL
BUN BLDV-MCNC: 4 MG/DL (ref 6–20)
CALCIUM SERPL-MCNC: 8.4 MG/DL (ref 8.6–10.2)
CHLORIDE BLD-SCNC: 104 MMOL/L (ref 98–107)
CO2: 29 MMOL/L (ref 22–29)
CREAT SERPL-MCNC: 0.8 MG/DL (ref 0.7–1.2)
EOSINOPHILS ABSOLUTE: 0.06 E9/L (ref 0.05–0.5)
EOSINOPHILS RELATIVE PERCENT: 1.2 % (ref 0–6)
GFR AFRICAN AMERICAN: >60
GFR NON-AFRICAN AMERICAN: >60 ML/MIN/1.73
GLUCOSE BLD-MCNC: 88 MG/DL (ref 74–99)
HCT VFR BLD CALC: 32.2 % (ref 37–54)
HEMOGLOBIN: 10.4 G/DL (ref 12.5–16.5)
IMMATURE GRANULOCYTES #: 0.17 E9/L
IMMATURE GRANULOCYTES %: 3.4 % (ref 0–5)
INR BLD: 1.3
LYMPHOCYTES ABSOLUTE: 1.5 E9/L (ref 1.5–4)
LYMPHOCYTES RELATIVE PERCENT: 30.1 % (ref 20–42)
MCH RBC QN AUTO: 28.2 PG (ref 26–35)
MCHC RBC AUTO-ENTMCNC: 32.3 % (ref 32–34.5)
MCV RBC AUTO: 87.3 FL (ref 80–99.9)
MONOCYTES ABSOLUTE: 0.52 E9/L (ref 0.1–0.95)
MONOCYTES RELATIVE PERCENT: 10.4 % (ref 2–12)
NEUTROPHILS ABSOLUTE: 2.68 E9/L (ref 1.8–7.3)
NEUTROPHILS RELATIVE PERCENT: 53.9 % (ref 43–80)
PDW BLD-RTO: 14 FL (ref 11.5–15)
PLATELET # BLD: 320 E9/L (ref 130–450)
PMV BLD AUTO: 8.6 FL (ref 7–12)
POTASSIUM SERPL-SCNC: 4.4 MMOL/L (ref 3.5–5)
PROTHROMBIN TIME: 15 SEC (ref 9.3–12.4)
RBC # BLD: 3.69 E12/L (ref 3.8–5.8)
SODIUM BLD-SCNC: 140 MMOL/L (ref 132–146)
TOTAL PROTEIN: 5.9 G/DL (ref 6.4–8.3)
WBC # BLD: 5 E9/L (ref 4.5–11.5)

## 2018-11-18 PROCEDURE — 80053 COMPREHEN METABOLIC PANEL: CPT

## 2018-11-18 PROCEDURE — 1200000000 HC SEMI PRIVATE

## 2018-11-18 PROCEDURE — 85610 PROTHROMBIN TIME: CPT

## 2018-11-18 PROCEDURE — 2580000003 HC RX 258: Performed by: INTERNAL MEDICINE

## 2018-11-18 PROCEDURE — 6370000000 HC RX 637 (ALT 250 FOR IP): Performed by: INTERNAL MEDICINE

## 2018-11-18 PROCEDURE — 6360000002 HC RX W HCPCS: Performed by: SPECIALIST

## 2018-11-18 PROCEDURE — 85025 COMPLETE CBC W/AUTO DIFF WBC: CPT

## 2018-11-18 PROCEDURE — 6360000002 HC RX W HCPCS: Performed by: INTERNAL MEDICINE

## 2018-11-18 PROCEDURE — 36415 COLL VENOUS BLD VENIPUNCTURE: CPT

## 2018-11-18 PROCEDURE — 94761 N-INVAS EAR/PLS OXIMETRY MLT: CPT

## 2018-11-18 PROCEDURE — 2580000003 HC RX 258: Performed by: SPECIALIST

## 2018-11-18 RX ORDER — ESCITALOPRAM OXALATE 10 MG/1
10 TABLET ORAL DAILY
Status: DISCONTINUED | OUTPATIENT
Start: 2018-11-19 | End: 2018-11-19 | Stop reason: HOSPADM

## 2018-11-18 RX ADMIN — ENOXAPARIN SODIUM 100 MG: 100 INJECTION SUBCUTANEOUS at 06:28

## 2018-11-18 RX ADMIN — AMPICILLIN SODIUM AND SULBACTAM SODIUM 3 G: 2; 1 INJECTION, POWDER, FOR SOLUTION INTRAMUSCULAR; INTRAVENOUS at 17:18

## 2018-11-18 RX ADMIN — ESCITALOPRAM OXALATE 5 MG: 10 TABLET ORAL at 08:03

## 2018-11-18 RX ADMIN — PANCRELIPASE 1 CAPSULE: 60000; 12000; 38000 CAPSULE, DELAYED RELEASE PELLETS ORAL at 08:03

## 2018-11-18 RX ADMIN — PANCRELIPASE 1 CAPSULE: 60000; 12000; 38000 CAPSULE, DELAYED RELEASE PELLETS ORAL at 11:27

## 2018-11-18 RX ADMIN — AMPICILLIN SODIUM AND SULBACTAM SODIUM 3 G: 2; 1 INJECTION, POWDER, FOR SOLUTION INTRAMUSCULAR; INTRAVENOUS at 00:31

## 2018-11-18 RX ADMIN — LORAZEPAM 0.5 MG: 0.5 TABLET ORAL at 11:27

## 2018-11-18 RX ADMIN — POTASSIUM CHLORIDE AND SODIUM CHLORIDE: 900; 150 INJECTION, SOLUTION INTRAVENOUS at 00:31

## 2018-11-18 RX ADMIN — AMPICILLIN SODIUM AND SULBACTAM SODIUM 3 G: 2; 1 INJECTION, POWDER, FOR SOLUTION INTRAMUSCULAR; INTRAVENOUS at 06:42

## 2018-11-18 RX ADMIN — ENOXAPARIN SODIUM 100 MG: 100 INJECTION SUBCUTANEOUS at 17:17

## 2018-11-18 RX ADMIN — WARFARIN SODIUM 5 MG: 5 TABLET ORAL at 17:17

## 2018-11-18 RX ADMIN — LORAZEPAM 0.5 MG: 0.5 TABLET ORAL at 06:28

## 2018-11-18 RX ADMIN — PANTOPRAZOLE SODIUM 40 MG: 40 TABLET, DELAYED RELEASE ORAL at 08:03

## 2018-11-18 RX ADMIN — LORAZEPAM 0.5 MG: 0.5 TABLET ORAL at 18:35

## 2018-11-18 RX ADMIN — PANCRELIPASE 1 CAPSULE: 60000; 12000; 38000 CAPSULE, DELAYED RELEASE PELLETS ORAL at 17:22

## 2018-11-18 RX ADMIN — Medication 10 ML: at 21:40

## 2018-11-18 RX ADMIN — Medication 10 ML: at 08:06

## 2018-11-18 RX ADMIN — AMPICILLIN SODIUM AND SULBACTAM SODIUM 3 G: 2; 1 INJECTION, POWDER, FOR SOLUTION INTRAMUSCULAR; INTRAVENOUS at 11:27

## 2018-11-18 ASSESSMENT — PAIN SCALES - GENERAL
PAINLEVEL_OUTOF10: 0

## 2018-11-18 NOTE — PLAN OF CARE
Problem: Physical Regulation:  Goal: Will remain free from infection  Will remain free from infection   Outcome: Met This Shift    Goal: Ability to maintain vital signs within normal range will improve  Ability to maintain vital signs within normal range will improve   Outcome: Met This Shift

## 2018-11-19 VITALS
HEIGHT: 70 IN | SYSTOLIC BLOOD PRESSURE: 116 MMHG | RESPIRATION RATE: 18 BRPM | OXYGEN SATURATION: 97 % | HEART RATE: 74 BPM | BODY MASS INDEX: 29.43 KG/M2 | DIASTOLIC BLOOD PRESSURE: 74 MMHG | TEMPERATURE: 97.4 F | WEIGHT: 205.6 LBS

## 2018-11-19 LAB
ALBUMIN SERPL-MCNC: 3.2 G/DL (ref 3.5–5.2)
ALP BLD-CCNC: 140 U/L (ref 40–129)
ALT SERPL-CCNC: 19 U/L (ref 0–40)
ANION GAP SERPL CALCULATED.3IONS-SCNC: 7 MMOL/L (ref 7–16)
AST SERPL-CCNC: 15 U/L (ref 0–39)
BASOPHILS ABSOLUTE: 0.06 E9/L (ref 0–0.2)
BASOPHILS RELATIVE PERCENT: 1 % (ref 0–2)
BILIRUB SERPL-MCNC: <0.2 MG/DL (ref 0–1.2)
BUN BLDV-MCNC: 7 MG/DL (ref 6–20)
CALCIUM SERPL-MCNC: 8.6 MG/DL (ref 8.6–10.2)
CHLORIDE BLD-SCNC: 103 MMOL/L (ref 98–107)
CO2: 29 MMOL/L (ref 22–29)
CREAT SERPL-MCNC: 0.8 MG/DL (ref 0.7–1.2)
CULTURE, BLOOD 2: ABNORMAL
CULTURE, BLOOD 2: ABNORMAL
EOSINOPHILS ABSOLUTE: 0 E9/L (ref 0.05–0.5)
EOSINOPHILS RELATIVE PERCENT: 0 % (ref 0–6)
GFR AFRICAN AMERICAN: >60
GFR NON-AFRICAN AMERICAN: >60 ML/MIN/1.73
GLUCOSE BLD-MCNC: 100 MG/DL (ref 74–99)
HCT VFR BLD CALC: 31.9 % (ref 37–54)
HEMOGLOBIN: 10.3 G/DL (ref 12.5–16.5)
INR BLD: 1.5
LYMPHOCYTES ABSOLUTE: 1.97 E9/L (ref 1.5–4)
LYMPHOCYTES RELATIVE PERCENT: 34 % (ref 20–42)
MCH RBC QN AUTO: 28.1 PG (ref 26–35)
MCHC RBC AUTO-ENTMCNC: 32.3 % (ref 32–34.5)
MCV RBC AUTO: 87.2 FL (ref 80–99.9)
MONOCYTES ABSOLUTE: 0.87 E9/L (ref 0.1–0.95)
MONOCYTES RELATIVE PERCENT: 15 % (ref 2–12)
MYELOCYTE PERCENT: 2 % (ref 0–0)
NEUTROPHILS ABSOLUTE: 2.9 E9/L (ref 1.8–7.3)
NEUTROPHILS RELATIVE PERCENT: 48 % (ref 43–80)
ORGANISM: ABNORMAL
PDW BLD-RTO: 14.1 FL (ref 11.5–15)
PLATELET # BLD: 338 E9/L (ref 130–450)
PMV BLD AUTO: 8.9 FL (ref 7–12)
POTASSIUM SERPL-SCNC: 4.1 MMOL/L (ref 3.5–5)
PROTHROMBIN TIME: 17 SEC (ref 9.3–12.4)
RBC # BLD: 3.66 E12/L (ref 3.8–5.8)
RBC # BLD: NORMAL 10*6/UL
SODIUM BLD-SCNC: 139 MMOL/L (ref 132–146)
TOTAL PROTEIN: 6 G/DL (ref 6.4–8.3)
WBC # BLD: 5.8 E9/L (ref 4.5–11.5)

## 2018-11-19 PROCEDURE — 36415 COLL VENOUS BLD VENIPUNCTURE: CPT

## 2018-11-19 PROCEDURE — 80053 COMPREHEN METABOLIC PANEL: CPT

## 2018-11-19 PROCEDURE — 94761 N-INVAS EAR/PLS OXIMETRY MLT: CPT

## 2018-11-19 PROCEDURE — 6360000002 HC RX W HCPCS: Performed by: SPECIALIST

## 2018-11-19 PROCEDURE — 6360000002 HC RX W HCPCS: Performed by: INTERNAL MEDICINE

## 2018-11-19 PROCEDURE — 85025 COMPLETE CBC W/AUTO DIFF WBC: CPT

## 2018-11-19 PROCEDURE — 6370000000 HC RX 637 (ALT 250 FOR IP): Performed by: INTERNAL MEDICINE

## 2018-11-19 PROCEDURE — 2580000003 HC RX 258: Performed by: SPECIALIST

## 2018-11-19 PROCEDURE — 85610 PROTHROMBIN TIME: CPT

## 2018-11-19 PROCEDURE — 36592 COLLECT BLOOD FROM PICC: CPT

## 2018-11-19 PROCEDURE — 6370000000 HC RX 637 (ALT 250 FOR IP): Performed by: NURSE PRACTITIONER

## 2018-11-19 PROCEDURE — 2580000003 HC RX 258: Performed by: INTERNAL MEDICINE

## 2018-11-19 RX ORDER — CLOTRIMAZOLE AND BETAMETHASONE DIPROPIONATE 10; .64 MG/G; MG/G
CREAM TOPICAL 2 TIMES DAILY
Status: DISCONTINUED | OUTPATIENT
Start: 2018-11-19 | End: 2018-11-19 | Stop reason: HOSPADM

## 2018-11-19 RX ORDER — CLOTRIMAZOLE AND BETAMETHASONE DIPROPIONATE 10; .64 MG/G; MG/G
CREAM TOPICAL
Qty: 1 TUBE | Refills: 0 | Status: SHIPPED | OUTPATIENT
Start: 2018-11-19 | End: 2019-01-01 | Stop reason: ALTCHOICE

## 2018-11-19 RX ORDER — AMPICILLIN AND SULBACTAM 2; 1 G/1; G/1
3 INJECTION, POWDER, FOR SOLUTION INTRAMUSCULAR; INTRAVENOUS EVERY 6 HOURS
Qty: 40 EACH | Refills: 0 | Status: SHIPPED | OUTPATIENT
Start: 2018-11-19 | End: 2018-12-10

## 2018-11-19 RX ORDER — CLOTRIMAZOLE 1 %
CREAM (GRAM) TOPICAL
Qty: 1 TUBE | Refills: 1 | Status: SHIPPED | OUTPATIENT
Start: 2018-11-19 | End: 2019-01-01 | Stop reason: ALTCHOICE

## 2018-11-19 RX ORDER — WARFARIN SODIUM 5 MG/1
TABLET ORAL
Qty: 30 TABLET | Refills: 3 | Status: SHIPPED | OUTPATIENT
Start: 2018-11-19 | End: 2018-12-10 | Stop reason: SDUPTHER

## 2018-11-19 RX ADMIN — PANCRELIPASE 1 CAPSULE: 60000; 12000; 38000 CAPSULE, DELAYED RELEASE PELLETS ORAL at 08:51

## 2018-11-19 RX ADMIN — ESCITALOPRAM OXALATE 10 MG: 10 TABLET ORAL at 08:51

## 2018-11-19 RX ADMIN — AMPICILLIN SODIUM AND SULBACTAM SODIUM 3 G: 2; 1 INJECTION, POWDER, FOR SOLUTION INTRAMUSCULAR; INTRAVENOUS at 06:04

## 2018-11-19 RX ADMIN — Medication 10 ML: at 10:18

## 2018-11-19 RX ADMIN — AMPICILLIN SODIUM AND SULBACTAM SODIUM 3 G: 2; 1 INJECTION, POWDER, FOR SOLUTION INTRAMUSCULAR; INTRAVENOUS at 12:20

## 2018-11-19 RX ADMIN — ENOXAPARIN SODIUM 100 MG: 100 INJECTION SUBCUTANEOUS at 06:04

## 2018-11-19 RX ADMIN — LORAZEPAM 0.5 MG: 0.5 TABLET ORAL at 08:54

## 2018-11-19 RX ADMIN — PANCRELIPASE 1 CAPSULE: 60000; 12000; 38000 CAPSULE, DELAYED RELEASE PELLETS ORAL at 12:20

## 2018-11-19 RX ADMIN — AMPICILLIN SODIUM AND SULBACTAM SODIUM 3 G: 2; 1 INJECTION, POWDER, FOR SOLUTION INTRAMUSCULAR; INTRAVENOUS at 01:04

## 2018-11-19 RX ADMIN — PANTOPRAZOLE SODIUM 40 MG: 40 TABLET, DELAYED RELEASE ORAL at 08:51

## 2018-11-19 NOTE — DISCHARGE SUMMARY
revealed WBC 1.1, hemoglobin of 11.8 potassium of 3.4, phosphorus 1.4. Imaging results are as outlined below in the Imaging section of this note. Upon arrival to the ER, patient was febrile at 102.9, tachycardic at 104. The patient received 2 L normal saline, Tylenol, cefepime, vancomycin, Naprosyn in the emergency room and was admitted to telemetry under the care of Dr. Tanner Orellana and Devonte. LABS[de-identified]  Lab Results   Component Value Date    WBC 5.8 11/19/2018    HGB 10.3 (L) 11/19/2018    HCT 31.9 (L) 11/19/2018     11/19/2018     11/19/2018    K 4.1 11/19/2018     11/19/2018    CREATININE 0.8 11/19/2018    BUN 7 11/19/2018    CO2 29 11/19/2018    GLUCOSE 100 (H) 11/19/2018    ALT 19 11/19/2018    AST 15 11/19/2018    INR 1.5 11/19/2018     Lab Results   Component Value Date    INR 1.5 11/19/2018    INR 1.3 11/18/2018    INR 1.4 11/17/2018    PROTIME 17.0 (H) 11/19/2018    PROTIME 15.0 (H) 11/18/2018    PROTIME 15.2 (H) 11/17/2018      Lab Results   Component Value Date    TSH 1.210 11/14/2018     Lab Results   Component Value Date    TRIG 52 11/14/2018     Lab Results   Component Value Date    HDL 25 11/14/2018     Lab Results   Component Value Date    LDLCALC 23 11/14/2018     Lab Results   Component Value Date    LABA1C 5.0 11/14/2018       IMAGING:  Ct Abdomen Pelvis W Iv Contrast Additional Contrast? Oral    Result Date: 11/16/2018  LOCATION:200 EXAM: CT ABDOMEN PELVIS W IV CONTRAST COMPARISON: None HISTORY: Abdominal pain TECHNIQUE:Contrast-enhanced helical abdomen and pelvis CT was performed. Coronal and sagittal reconstructions also obtained. Automated dose control was used for this exam. CONTRAST: 80 mL Isovue-370 intravenous contrast was administered. No oral contrast administered. FINDINGS: SUPPORT DEVICES: None LOWER THORAX: Limited evaluation of the lower chest demonstrates clear lung bases bilaterally.  SOLID ORGANS: The liver, spleen, pancreas, and adrenal glands are normal. BILIARY SYSTEM: Pneumobilia from previous hepaticojejunostomy. GENITOURINARY: The kidneys are normal in appearance bilaterally with no evidence of hydronephrosis. No stones are appreciated. The bladder is unremarkable. GASTROINTESTINAL: The stomach, small and large bowel are normal in caliber without evidence of focal wall thickening. There is no evidence of bowel obstruction. APPENDIX: Not well-visualized although there is no significant inflammatory change noted around the cecum. FLUID COLLECTIONS: None. LYMPH NODES: No adenopathy by size criteria. VASCULAR STRUCTURES: There is occlusion of the superior mesenteric vein just distal to the portal vein confluence. There is mild fluid and fat stranding seen within the adjacent mesentery. ABDOMINAL WALL: Normal with no herniations. OSSEOUS AND SOFT TISSUE STRUCTURES: Bone windows demonstrate no suspicious osteolytic or osteoblastic lesions. No fracture identified. 1.  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 Xr Chest Portable    Result Date: 11/13/2018  LOCATION: 200 EXAM: XR CHEST PORTABLE COMPARISON: May 19, 2018 HISTORY: Shortness of breath TECHNIQUE: Single frontal view of the chest was obtained. FINDINGS:  SUPPORT DEVICES: Portacatheter projects at the cavoatrial junction. . LUNGS: Clear with no areas of consolidation. PLEURA: No effusions or pneumothorax. LUNG VOLUMES: Satisfactory inspirator effort. MEDIASTINAL STRUCTURES: No lymphadenopathy. Normal aortic contour. HEART SIZE: Normal. BONES AND SOFT TISSUES: No fracture or soft tissue abnormality. 1. Negative portable chest.      Us Dup Upper Extremity Right Venous    Result Date: 11/16/2018  LOCATION: 200 EXAM: US DUP UPPER EXTREMITY RIGHT VENOUS COMPARISON: None HISTORY: Swelling Technique:  Multiple Realtime, grayscale and color-flow spectral waveform analysis Doppler ultrasound images of the right upper extremity was performed.  Evaluation was obtained from the forearm to the neck. Compression technique as well as doppler and color flow images were obtained. Findings: Normal flow and color flow patterns were identified with normal changes to  venous augmentation. Complete compression of the venous system was also noted signifying no evidence of acute vein thrombosis. No soft tissue mass or cyst is identified. 1. No evidence of acute venous thrombosis right upper extremity. HOSPITAL COURSE:   Tory Sylvester did extremely well throughout the hospital stay. He was evaluated by multiple subspecialists including the oncology, infectious disease, and cardiology teams. He was found to be suffering from Clostridium bacteremia. IV antibiotic therapy has been arranged as an outpatient. He underwent transesophageal echocardiogram secondary to the finding of bacteremia and no evidence of endocarditis was identified. He was also evaluated by the oncologic team with plans for resumption of chemotherapy once IV antibiotic therapy is completed as an outpatient. Lab work and vital signs stabilized. He remained afebrile. Was ultimately determined he would be acceptable for discharge home. Home health care will be arranged as he will undergo Lovenox bridging as an outpatient until INR is therapeutic. BRIEF PHYSICAL EXAMINATION AND LABORATORIES ON DAY OF DISCHARGE:  VITALS:  /82   Pulse 80   Temp 98 °F (36.7 °C) (Oral)   Resp 18   Ht 5' 10\" (1.778 m)   Wt 205 lb 9.6 oz (93.3 kg)   SpO2 93%   BMI 29.50 kg/m²     HEENT:  PERRLA. EOMI. Sclera clear. Buccal mucosa moist.    Neck:  Supple. Trachea midline. No thyromegaly. No JVD. No bruits. Heart:  Rhythm regular, rate controlled. No murmurs. Lungs:  Symmetrical. Clear to auscultation bilaterally. No wheezes. No rhonchi. No rales. Abdomen: Soft. Non-tender. Non-distended. Bowel sounds positive. No organomegaly or masses. No pain on palpation    Extremities:  Peripheral pulses present. No peripheral edema.   No

## 2018-11-19 NOTE — CARE COORDINATION
CM note: copay for Lovenox is $100 for 5 day supply. Patient informed and is prepared to pay that amount. Script is being filled at [facility] in [facility].   Electronically signed by Malia Irvin RN on 11/19/2018 at 1:10 PM

## 2018-11-19 NOTE — CARE COORDINATION
SS NOTE: [facility] made aware of the need for home IV Unison. They will need the RX for completion of referral.  Marcy Rojas. [place]. 11/19/2018.11:05 AM.

## 2018-11-19 NOTE — PROGRESS NOTES
[facility] Quality Flow/Interdisciplinary Rounds Progress Note        Quality Flow Rounds held on November 19, 2018    Disciplines Attending:  Bedside Nurse, ,  and Nursing Unit Leadership    Paul Starkey was admitted on 11/13/2018  2:02 PM    Anticipated Discharge Date:  Expected Discharge Date: 11/17/18    Disposition:    Cali Score:  Cali Scale Score: 23    Readmission Risk              Risk of Unplanned Readmission:        10           Discussed patient goal for the day, patient clinical progression, and barriers to discharge.   The following Goal(s) of the Day/Commitment(s) have been identified:  Activity progression, Port, Unasyn for 3 weeks, prompt for DC, need [facility] daniela Glez  November 19, 2018

## 2018-11-19 NOTE — PROGRESS NOTES
[address] [place] [address] [facility]  Progress Note  CC: bacteremia- doing ok today    SUBJECTIVE:  Patient is tolerating medications. No reported adverse drug reactions. Shaved hair  ROS: No nausea, vomiting, diarrhea. No chest pain, no shortness of breath. No further fevers   tolerating atbx  mediport   Questions answered  Medications:  Scheduled Meds:   clotrimazole-betamethasone   Topical BID    escitalopram  10 mg Oral Daily    warfarin  5 mg Oral Daily    lipase-protease-amylase  1 capsule Oral TID WC    sodium chloride flush  10 mL Intravenous 2 times per day    ampicillin-sulbactam  3 g Intravenous Q6H    enoxaparin  1 mg/kg Subcutaneous Q12H    pantoprazole  40 mg Oral Daily     Continuous Infusions:    PRN Meds:LORazepam, sodium chloride flush, polyvinyl alcohol, heparin flush, diphenhydrAMINE, diphenoxylate-atropine, loperamide, promethazine, sodium chloride flush, magnesium hydroxide, acetaminophen, sodium phosphate IVPB **OR** sodium phosphate IVPB  OBJECTIVE:  Patient Vitals for the past 24 hrs:   BP Temp Temp src Pulse Resp SpO2   11/19/18 0753 121/82 98 °F (36.7 °C) Oral 80 18 93 %   11/18/18 2345 109/70 98.7 °F (37.1 °C) Oral 88 18 93 %   11/18/18 2000 - - - - - 95 %   11/18/18 1715 110/74 99.2 °F (37.3 °C) Oral 88 16 96 %   11/18/18 1200 121/79 97.8 °F (36.6 °C) Oral 78 14 97 %     Constitutional: The patient is awake, alert, and oriented. Skin: Warm and dry. No rashes were noted. tattoos  Head: Eyes show round, and reactive pupils. No jaundice. Rash head   Chest: No use of accessory muscles to breathe. Symmetrical expansion. Auscultation reveals no wheezing, crackles, or rhonchi. Cardiovascular: S1 and S2 are rhythmic and regular. No murmurs appreciated. Abdomen: Positive bowel sounds to auscultation. Benign to palpation. No tenderness. Extremities: No clubbing, no cyanosis, no edema.   Right chest mediport- accessed     Laboratory and Tests Review:  Lab Results

## 2018-11-20 LAB
BLOOD CULTURE, ROUTINE: NORMAL
CULTURE, BLOOD 2: NORMAL

## 2018-12-26 ENCOUNTER — HOSPITAL ENCOUNTER (OUTPATIENT)
Age: 58
Discharge: HOME | End: 2018-12-26
Payer: COMMERCIAL

## 2018-12-26 DIAGNOSIS — R78.81 BACTEREMIA: ICD-10-CM

## 2018-12-26 PROCEDURE — 36415 COLL VENOUS BLD VENIPUNCTURE: CPT

## 2018-12-26 PROCEDURE — 87040 BLOOD CULTURE FOR BACTERIA: CPT

## 2018-12-31 LAB
BLOOD CULTURE, ROUTINE: NORMAL
CULTURE, BLOOD 2: NORMAL

## 2019-01-01 ENCOUNTER — APPOINTMENT (OUTPATIENT)
Dept: MRI IMAGING | Age: 59
DRG: 435 | End: 2019-01-01
Payer: COMMERCIAL

## 2019-01-01 ENCOUNTER — APPOINTMENT (OUTPATIENT)
Dept: GENERAL RADIOLOGY | Age: 59
DRG: 809 | End: 2019-01-01
Payer: COMMERCIAL

## 2019-01-01 ENCOUNTER — APPOINTMENT (OUTPATIENT)
Dept: GENERAL RADIOLOGY | Age: 59
DRG: 435 | End: 2019-01-01
Payer: COMMERCIAL

## 2019-01-01 ENCOUNTER — HOSPITAL ENCOUNTER (EMERGENCY)
Age: 59
Discharge: HOME | DRG: 872 | End: 2019-07-18
Attending: EMERGENCY MEDICINE
Payer: COMMERCIAL

## 2019-01-01 ENCOUNTER — APPOINTMENT (OUTPATIENT)
Dept: GENERAL RADIOLOGY | Age: 59
DRG: 660 | End: 2019-01-01
Payer: MEDICAID

## 2019-01-01 ENCOUNTER — APPOINTMENT (OUTPATIENT)
Dept: GENERAL RADIOLOGY | Age: 59
DRG: 872 | End: 2019-01-01
Payer: COMMERCIAL

## 2019-01-01 ENCOUNTER — HOSPITAL ENCOUNTER (INPATIENT)
Age: 59
LOS: 3 days | Discharge: HOME | DRG: 872 | End: 2019-07-22
Attending: INTERNAL MEDICINE | Admitting: INTERNAL MEDICINE
Payer: COMMERCIAL

## 2019-01-01 ENCOUNTER — APPOINTMENT (OUTPATIENT)
Dept: GENERAL RADIOLOGY | Age: 59
End: 2019-01-01
Payer: COMMERCIAL

## 2019-01-01 ENCOUNTER — APPOINTMENT (OUTPATIENT)
Dept: CT IMAGING | Age: 59
DRG: 809 | End: 2019-01-01
Payer: COMMERCIAL

## 2019-01-01 ENCOUNTER — APPOINTMENT (OUTPATIENT)
Dept: ULTRASOUND IMAGING | Age: 59
DRG: 435 | End: 2019-01-01
Payer: COMMERCIAL

## 2019-01-01 ENCOUNTER — APPOINTMENT (OUTPATIENT)
Dept: CT IMAGING | Age: 59
DRG: 871 | End: 2019-01-01
Payer: COMMERCIAL

## 2019-01-01 ENCOUNTER — APPOINTMENT (OUTPATIENT)
Dept: ULTRASOUND IMAGING | Age: 59
DRG: 871 | End: 2019-01-01
Payer: COMMERCIAL

## 2019-01-01 ENCOUNTER — APPOINTMENT (OUTPATIENT)
Dept: MRI IMAGING | Age: 59
DRG: 809 | End: 2019-01-01
Payer: COMMERCIAL

## 2019-01-01 ENCOUNTER — HOSPITAL ENCOUNTER (EMERGENCY)
Age: 59
Discharge: LEFT BEFORE DISCHARGE | End: 2019-06-29
Attending: EMERGENCY MEDICINE
Payer: COMMERCIAL

## 2019-01-01 ENCOUNTER — HOSPITAL ENCOUNTER (INPATIENT)
Age: 59
LOS: 2 days | Discharge: HOME | DRG: 660 | End: 2019-11-10
Attending: EMERGENCY MEDICINE | Admitting: INTERNAL MEDICINE
Payer: MEDICAID

## 2019-01-01 ENCOUNTER — APPOINTMENT (OUTPATIENT)
Dept: GENERAL RADIOLOGY | Age: 59
DRG: 871 | End: 2019-01-01
Payer: COMMERCIAL

## 2019-01-01 ENCOUNTER — HOSPITAL ENCOUNTER (INPATIENT)
Age: 59
LOS: 3 days | Discharge: HOME | DRG: 435 | End: 2019-08-08
Attending: EMERGENCY MEDICINE | Admitting: INTERNAL MEDICINE
Payer: COMMERCIAL

## 2019-01-01 ENCOUNTER — HOSPITAL ENCOUNTER (INPATIENT)
Age: 59
LOS: 3 days | Discharge: ANOTHER HOSPITAL | DRG: 809 | End: 2019-06-13
Attending: EMERGENCY MEDICINE | Admitting: INTERNAL MEDICINE
Payer: COMMERCIAL

## 2019-01-01 ENCOUNTER — HOSPITAL ENCOUNTER (INPATIENT)
Age: 59
LOS: 3 days | Discharge: HOME | DRG: 871 | End: 2019-05-04
Attending: EMERGENCY MEDICINE | Admitting: INTERNAL MEDICINE
Payer: COMMERCIAL

## 2019-01-01 ENCOUNTER — HOSPITAL ENCOUNTER (EMERGENCY)
Age: 59
Discharge: HOME | DRG: 809 | End: 2019-06-09
Attending: EMERGENCY MEDICINE
Payer: COMMERCIAL

## 2019-01-01 ENCOUNTER — HOSPITAL ENCOUNTER (OUTPATIENT)
Dept: CT IMAGING | Age: 59
Discharge: HOME | End: 2019-09-06
Payer: COMMERCIAL

## 2019-01-01 VITALS
BODY MASS INDEX: 27.43 KG/M2 | TEMPERATURE: 98.4 F | WEIGHT: 191.6 LBS | SYSTOLIC BLOOD PRESSURE: 127 MMHG | OXYGEN SATURATION: 99 % | DIASTOLIC BLOOD PRESSURE: 66 MMHG | HEIGHT: 70 IN | RESPIRATION RATE: 16 BRPM | HEART RATE: 75 BPM

## 2019-01-01 VITALS
TEMPERATURE: 98.4 F | WEIGHT: 180 LBS | SYSTOLIC BLOOD PRESSURE: 138 MMHG | DIASTOLIC BLOOD PRESSURE: 76 MMHG | OXYGEN SATURATION: 98 % | HEART RATE: 55 BPM | HEIGHT: 70 IN | BODY MASS INDEX: 25.77 KG/M2 | RESPIRATION RATE: 18 BRPM

## 2019-01-01 VITALS
OXYGEN SATURATION: 98 % | WEIGHT: 190.9 LBS | DIASTOLIC BLOOD PRESSURE: 73 MMHG | RESPIRATION RATE: 18 BRPM | TEMPERATURE: 97.1 F | BODY MASS INDEX: 27.33 KG/M2 | HEART RATE: 71 BPM | SYSTOLIC BLOOD PRESSURE: 113 MMHG | HEIGHT: 70 IN

## 2019-01-01 VITALS
RESPIRATION RATE: 18 BRPM | HEART RATE: 76 BPM | TEMPERATURE: 98.5 F | SYSTOLIC BLOOD PRESSURE: 91 MMHG | DIASTOLIC BLOOD PRESSURE: 63 MMHG | HEIGHT: 70 IN | BODY MASS INDEX: 27.2 KG/M2 | WEIGHT: 190 LBS | OXYGEN SATURATION: 96 %

## 2019-01-01 VITALS
DIASTOLIC BLOOD PRESSURE: 76 MMHG | TEMPERATURE: 98 F | WEIGHT: 206 LBS | RESPIRATION RATE: 14 BRPM | HEART RATE: 89 BPM | HEIGHT: 70 IN | OXYGEN SATURATION: 98 % | BODY MASS INDEX: 29.49 KG/M2 | SYSTOLIC BLOOD PRESSURE: 130 MMHG

## 2019-01-01 VITALS
TEMPERATURE: 98.4 F | OXYGEN SATURATION: 98 % | HEART RATE: 73 BPM | RESPIRATION RATE: 18 BRPM | HEIGHT: 70 IN | WEIGHT: 185.25 LBS | DIASTOLIC BLOOD PRESSURE: 84 MMHG | SYSTOLIC BLOOD PRESSURE: 137 MMHG | BODY MASS INDEX: 26.52 KG/M2

## 2019-01-01 VITALS
WEIGHT: 189.06 LBS | DIASTOLIC BLOOD PRESSURE: 67 MMHG | HEART RATE: 104 BPM | BODY MASS INDEX: 27.07 KG/M2 | HEIGHT: 70 IN | SYSTOLIC BLOOD PRESSURE: 111 MMHG | RESPIRATION RATE: 16 BRPM | TEMPERATURE: 99.4 F | OXYGEN SATURATION: 97 %

## 2019-01-01 VITALS
RESPIRATION RATE: 18 BRPM | HEART RATE: 88 BPM | DIASTOLIC BLOOD PRESSURE: 64 MMHG | SYSTOLIC BLOOD PRESSURE: 100 MMHG | WEIGHT: 181.25 LBS | TEMPERATURE: 99.2 F | OXYGEN SATURATION: 99 % | BODY MASS INDEX: 26.01 KG/M2

## 2019-01-01 DIAGNOSIS — R50.9 FEVER, UNSPECIFIED FEVER CAUSE: Primary | ICD-10-CM

## 2019-01-01 DIAGNOSIS — R50.9 FEVER, UNSPECIFIED FEVER CAUSE: ICD-10-CM

## 2019-01-01 DIAGNOSIS — R74.8 ELEVATED LIVER ENZYMES: ICD-10-CM

## 2019-01-01 DIAGNOSIS — C22.9 MALIGNANT NEOPLASM OF LIVER, UNSPECIFIED LIVER MALIGNANCY TYPE (HCC): ICD-10-CM

## 2019-01-01 DIAGNOSIS — C25.0 ADENOCARCINOMA OF HEAD OF PANCREAS (HCC): ICD-10-CM

## 2019-01-01 DIAGNOSIS — A41.9 SEPSIS, DUE TO UNSPECIFIED ORGANISM, UNSPECIFIED WHETHER ACUTE ORGAN DYSFUNCTION PRESENT (HCC): ICD-10-CM

## 2019-01-01 DIAGNOSIS — C25.9 MALIGNANT NEOPLASM OF PANCREAS, UNSPECIFIED LOCATION OF MALIGNANCY (HCC): ICD-10-CM

## 2019-01-01 DIAGNOSIS — J18.9 PNEUMONIA DUE TO ORGANISM: Primary | ICD-10-CM

## 2019-01-01 DIAGNOSIS — A41.9 SEPTICEMIA (HCC): Primary | ICD-10-CM

## 2019-01-01 DIAGNOSIS — R50.9 FEVER IN ADULT: ICD-10-CM

## 2019-01-01 DIAGNOSIS — A41.51 SEPSIS DUE TO ESCHERICHIA COLI (HCC): Primary | ICD-10-CM

## 2019-01-01 DIAGNOSIS — R51.9 ACUTE NONINTRACTABLE HEADACHE, UNSPECIFIED HEADACHE TYPE: Primary | ICD-10-CM

## 2019-01-01 DIAGNOSIS — E87.20 LACTIC ACIDOSIS: ICD-10-CM

## 2019-01-01 LAB
ACANTHOCYTES: ABNORMAL
ALBUMIN SERPL-MCNC: 2.9 G/DL (ref 3.5–5.2)
ALBUMIN SERPL-MCNC: 3 G/DL (ref 3.5–5.2)
ALBUMIN SERPL-MCNC: 3.1 G/DL (ref 3.5–5.2)
ALBUMIN SERPL-MCNC: 3.1 G/DL (ref 3.5–5.2)
ALBUMIN SERPL-MCNC: 3.2 G/DL (ref 3.5–5.2)
ALBUMIN SERPL-MCNC: 3.3 G/DL (ref 3.5–5.2)
ALBUMIN SERPL-MCNC: 3.4 G/DL (ref 3.5–5.2)
ALBUMIN SERPL-MCNC: 3.5 G/DL (ref 3.5–5.2)
ALBUMIN SERPL-MCNC: 3.6 G/DL (ref 3.5–5.2)
ALBUMIN SERPL-MCNC: 3.8 G/DL (ref 3.5–5.2)
ALP BLD-CCNC: 123 U/L (ref 40–129)
ALP BLD-CCNC: 148 U/L (ref 40–129)
ALP BLD-CCNC: 167 U/L (ref 40–129)
ALP BLD-CCNC: 169 U/L (ref 40–129)
ALP BLD-CCNC: 203 U/L (ref 40–129)
ALP BLD-CCNC: 278 U/L (ref 40–129)
ALP BLD-CCNC: 289 U/L (ref 40–129)
ALP BLD-CCNC: 301 U/L (ref 40–129)
ALP BLD-CCNC: 305 U/L (ref 40–129)
ALP BLD-CCNC: 320 U/L (ref 40–129)
ALP BLD-CCNC: 326 U/L (ref 40–129)
ALP BLD-CCNC: 370 U/L (ref 40–129)
ALP BLD-CCNC: 432 U/L (ref 40–129)
ALP BLD-CCNC: 563 U/L (ref 40–129)
ALP BLD-CCNC: 676 U/L (ref 40–129)
ALT SERPL-CCNC: 105 U/L (ref 0–40)
ALT SERPL-CCNC: 134 U/L (ref 0–40)
ALT SERPL-CCNC: 139 U/L (ref 0–40)
ALT SERPL-CCNC: 215 U/L (ref 0–40)
ALT SERPL-CCNC: 238 U/L (ref 0–40)
ALT SERPL-CCNC: 24 U/L (ref 0–40)
ALT SERPL-CCNC: 34 U/L (ref 0–40)
ALT SERPL-CCNC: 41 U/L (ref 0–40)
ALT SERPL-CCNC: 50 U/L (ref 0–40)
ALT SERPL-CCNC: 56 U/L (ref 0–40)
ALT SERPL-CCNC: 60 U/L (ref 0–40)
ALT SERPL-CCNC: 67 U/L (ref 0–40)
ALT SERPL-CCNC: 70 U/L (ref 0–40)
ALT SERPL-CCNC: 75 U/L (ref 0–40)
ALT SERPL-CCNC: 89 U/L (ref 0–40)
AMORPHOUS: NORMAL
ANION GAP SERPL CALCULATED.3IONS-SCNC: 10 MMOL/L (ref 7–16)
ANION GAP SERPL CALCULATED.3IONS-SCNC: 11 MMOL/L (ref 7–16)
ANION GAP SERPL CALCULATED.3IONS-SCNC: 12 MMOL/L (ref 7–16)
ANION GAP SERPL CALCULATED.3IONS-SCNC: 13 MMOL/L (ref 7–16)
ANION GAP SERPL CALCULATED.3IONS-SCNC: 14 MMOL/L (ref 7–16)
ANION GAP SERPL CALCULATED.3IONS-SCNC: 6 MMOL/L (ref 7–16)
ANION GAP SERPL CALCULATED.3IONS-SCNC: 7 MMOL/L (ref 7–16)
ANION GAP SERPL CALCULATED.3IONS-SCNC: 8 MMOL/L (ref 7–16)
ANION GAP SERPL CALCULATED.3IONS-SCNC: 9 MMOL/L (ref 7–16)
ANISOCYTOSIS: ABNORMAL
AST SERPL-CCNC: 128 U/L (ref 0–39)
AST SERPL-CCNC: 18 U/L (ref 0–39)
AST SERPL-CCNC: 23 U/L (ref 0–39)
AST SERPL-CCNC: 241 U/L (ref 0–39)
AST SERPL-CCNC: 241 U/L (ref 0–39)
AST SERPL-CCNC: 27 U/L (ref 0–39)
AST SERPL-CCNC: 31 U/L (ref 0–39)
AST SERPL-CCNC: 33 U/L (ref 0–39)
AST SERPL-CCNC: 33 U/L (ref 0–39)
AST SERPL-CCNC: 34 U/L (ref 0–39)
AST SERPL-CCNC: 35 U/L (ref 0–39)
AST SERPL-CCNC: 36 U/L (ref 0–39)
AST SERPL-CCNC: 42 U/L (ref 0–39)
AST SERPL-CCNC: 72 U/L (ref 0–39)
AST SERPL-CCNC: 79 U/L (ref 0–39)
BACTERIA: NORMAL /HPF
BACTERIA: NORMAL /HPF
BASOPHILS ABSOLUTE: 0 E9/L (ref 0–0.2)
BASOPHILS ABSOLUTE: 0.02 E9/L (ref 0–0.2)
BASOPHILS ABSOLUTE: 0.03 E9/L (ref 0–0.2)
BASOPHILS RELATIVE PERCENT: 0.2 % (ref 0–2)
BASOPHILS RELATIVE PERCENT: 0.2 % (ref 0–2)
BASOPHILS RELATIVE PERCENT: 0.3 % (ref 0–2)
BASOPHILS RELATIVE PERCENT: 0.4 % (ref 0–2)
BASOPHILS RELATIVE PERCENT: 0.4 % (ref 0–2)
BASOPHILS RELATIVE PERCENT: 0.5 % (ref 0–2)
BASOPHILS RELATIVE PERCENT: 0.7 % (ref 0–2)
BASOPHILS RELATIVE PERCENT: 0.9 % (ref 0–2)
BASOPHILS RELATIVE PERCENT: 1.1 % (ref 0–2)
BASOPHILS RELATIVE PERCENT: 1.3 % (ref 0–2)
BASOPHILS RELATIVE PERCENT: 2 % (ref 0–2)
BILIRUB SERPL-MCNC: 0.3 MG/DL (ref 0–1.2)
BILIRUB SERPL-MCNC: 0.4 MG/DL (ref 0–1.2)
BILIRUB SERPL-MCNC: 0.5 MG/DL (ref 0–1.2)
BILIRUB SERPL-MCNC: 0.6 MG/DL (ref 0–1.2)
BILIRUB SERPL-MCNC: 0.6 MG/DL (ref 0–1.2)
BILIRUB SERPL-MCNC: 0.7 MG/DL (ref 0–1.2)
BILIRUB SERPL-MCNC: 0.8 MG/DL (ref 0–1.2)
BILIRUB SERPL-MCNC: 1 MG/DL (ref 0–1.2)
BILIRUBIN URINE: NEGATIVE
BLOOD CULTURE, ROUTINE: ABNORMAL
BLOOD CULTURE, ROUTINE: NORMAL
BLOOD, URINE: NEGATIVE
BUN BLDV-MCNC: 10 MG/DL (ref 6–20)
BUN BLDV-MCNC: 11 MG/DL (ref 6–20)
BUN BLDV-MCNC: 13 MG/DL (ref 6–20)
BUN BLDV-MCNC: 3 MG/DL (ref 6–20)
BUN BLDV-MCNC: 4 MG/DL (ref 6–20)
BUN BLDV-MCNC: 5 MG/DL (ref 6–20)
BUN BLDV-MCNC: 6 MG/DL (ref 6–20)
BUN BLDV-MCNC: 7 MG/DL (ref 6–20)
BUN BLDV-MCNC: 8 MG/DL (ref 6–20)
BUN BLDV-MCNC: 9 MG/DL (ref 6–20)
BUN BLDV-MCNC: 9 MG/DL (ref 6–20)
BURR CELLS: ABNORMAL
C-REACTIVE PROTEIN: 15.7 MG/DL (ref 0–0.4)
C-REACTIVE PROTEIN: 8.3 MG/DL (ref 0–0.4)
C-REACTIVE PROTEIN: 9.7 MG/DL (ref 0–0.4)
CALCIUM SERPL-MCNC: 7.9 MG/DL (ref 8.6–10.2)
CALCIUM SERPL-MCNC: 8.1 MG/DL (ref 8.6–10.2)
CALCIUM SERPL-MCNC: 8.3 MG/DL (ref 8.6–10.2)
CALCIUM SERPL-MCNC: 8.3 MG/DL (ref 8.6–10.2)
CALCIUM SERPL-MCNC: 8.4 MG/DL (ref 8.6–10.2)
CALCIUM SERPL-MCNC: 8.5 MG/DL (ref 8.6–10.2)
CALCIUM SERPL-MCNC: 8.6 MG/DL (ref 8.6–10.2)
CALCIUM SERPL-MCNC: 8.7 MG/DL (ref 8.6–10.2)
CALCIUM SERPL-MCNC: 8.8 MG/DL (ref 8.6–10.2)
CHLORIDE BLD-SCNC: 101 MMOL/L (ref 98–107)
CHLORIDE BLD-SCNC: 101 MMOL/L (ref 98–107)
CHLORIDE BLD-SCNC: 102 MMOL/L (ref 98–107)
CHLORIDE BLD-SCNC: 104 MMOL/L (ref 98–107)
CHLORIDE BLD-SCNC: 104 MMOL/L (ref 98–107)
CHLORIDE BLD-SCNC: 105 MMOL/L (ref 98–107)
CHLORIDE BLD-SCNC: 106 MMOL/L (ref 98–107)
CHLORIDE BLD-SCNC: 107 MMOL/L (ref 98–107)
CHLORIDE BLD-SCNC: 108 MMOL/L (ref 98–107)
CHLORIDE BLD-SCNC: 109 MMOL/L (ref 98–107)
CHOLESTEROL, TOTAL: 72 MG/DL (ref 0–199)
CLARITY: CLEAR
CO2: 21 MMOL/L (ref 22–29)
CO2: 22 MMOL/L (ref 22–29)
CO2: 22 MMOL/L (ref 22–29)
CO2: 23 MMOL/L (ref 22–29)
CO2: 24 MMOL/L (ref 22–29)
CO2: 25 MMOL/L (ref 22–29)
CO2: 26 MMOL/L (ref 22–29)
CO2: 26 MMOL/L (ref 22–29)
CO2: 27 MMOL/L (ref 22–29)
CO2: 27 MMOL/L (ref 22–29)
COLOR: YELLOW
CREAT SERPL-MCNC: 0.6 MG/DL (ref 0.7–1.2)
CREAT SERPL-MCNC: 0.7 MG/DL (ref 0.7–1.2)
CREAT SERPL-MCNC: 0.8 MG/DL (ref 0.7–1.2)
CULTURE, BLOOD 2: NORMAL
DOHLE BODIES: ABNORMAL
EKG ATRIAL RATE: 0 BPM
EKG ATRIAL RATE: 100 BPM
EKG ATRIAL RATE: 112 BPM
EKG ATRIAL RATE: 86 BPM
EKG ATRIAL RATE: 89 BPM
EKG P AXIS: 40 DEGREES
EKG P AXIS: 49 DEGREES
EKG P AXIS: 53 DEGREES
EKG P AXIS: 54 DEGREES
EKG P-R INTERVAL: 128 MS
EKG P-R INTERVAL: 132 MS
EKG P-R INTERVAL: 144 MS
EKG P-R INTERVAL: 146 MS
EKG Q-T INTERVAL: 0 MS
EKG Q-T INTERVAL: 322 MS
EKG Q-T INTERVAL: 324 MS
EKG Q-T INTERVAL: 364 MS
EKG Q-T INTERVAL: 380 MS
EKG QRS DURATION: 0 MS
EKG QRS DURATION: 80 MS
EKG QRS DURATION: 86 MS
EKG QRS DURATION: 88 MS
EKG QRS DURATION: 88 MS
EKG QTC CALCULATION (BAZETT): 0 MS
EKG QTC CALCULATION (BAZETT): 417 MS
EKG QTC CALCULATION (BAZETT): 435 MS
EKG QTC CALCULATION (BAZETT): 439 MS
EKG QTC CALCULATION (BAZETT): 462 MS
EKG R AXIS: 0 DEGREES
EKG R AXIS: 20 DEGREES
EKG R AXIS: 24 DEGREES
EKG R AXIS: 31 DEGREES
EKG R AXIS: 8 DEGREES
EKG T AXIS: -14 DEGREES
EKG T AXIS: 0 DEGREES
EKG T AXIS: 19 DEGREES
EKG T AXIS: 28 DEGREES
EKG T AXIS: 30 DEGREES
EKG VENTRICULAR RATE: 0 BPM
EKG VENTRICULAR RATE: 100 BPM
EKG VENTRICULAR RATE: 112 BPM
EKG VENTRICULAR RATE: 86 BPM
EKG VENTRICULAR RATE: 89 BPM
EOSINOPHILS ABSOLUTE: 0 E9/L (ref 0.05–0.5)
EOSINOPHILS ABSOLUTE: 0.01 E9/L (ref 0.05–0.5)
EOSINOPHILS ABSOLUTE: 0.03 E9/L (ref 0.05–0.5)
EOSINOPHILS ABSOLUTE: 0.04 E9/L (ref 0.05–0.5)
EOSINOPHILS ABSOLUTE: 0.05 E9/L (ref 0.05–0.5)
EOSINOPHILS ABSOLUTE: 0.06 E9/L (ref 0.05–0.5)
EOSINOPHILS ABSOLUTE: 0.06 E9/L (ref 0.05–0.5)
EOSINOPHILS ABSOLUTE: 0.07 E9/L (ref 0.05–0.5)
EOSINOPHILS ABSOLUTE: 0.09 E9/L (ref 0.05–0.5)
EOSINOPHILS ABSOLUTE: 0.14 E9/L (ref 0.05–0.5)
EOSINOPHILS ABSOLUTE: 0.15 E9/L (ref 0.05–0.5)
EOSINOPHILS ABSOLUTE: 0.25 E9/L (ref 0.05–0.5)
EOSINOPHILS RELATIVE PERCENT: 0 % (ref 0–6)
EOSINOPHILS RELATIVE PERCENT: 0 % (ref 0–6)
EOSINOPHILS RELATIVE PERCENT: 0.2 % (ref 0–6)
EOSINOPHILS RELATIVE PERCENT: 0.5 % (ref 0–6)
EOSINOPHILS RELATIVE PERCENT: 0.9 % (ref 0–6)
EOSINOPHILS RELATIVE PERCENT: 1 % (ref 0–6)
EOSINOPHILS RELATIVE PERCENT: 1.6 % (ref 0–6)
EOSINOPHILS RELATIVE PERCENT: 1.7 % (ref 0–6)
EOSINOPHILS RELATIVE PERCENT: 1.8 % (ref 0–6)
EOSINOPHILS RELATIVE PERCENT: 1.8 % (ref 0–6)
EOSINOPHILS RELATIVE PERCENT: 2.6 % (ref 0–6)
EOSINOPHILS RELATIVE PERCENT: 4.3 % (ref 0–6)
EPITHELIAL CELLS, UA: NORMAL /HPF
EPITHELIAL CELLS, UA: NORMAL /HPF
FILM ARRAY ADENOVIRUS: NORMAL
FILM ARRAY BORDETELLA PERTUSSIS: NORMAL
FILM ARRAY CHLAMYDOPHILIA PNEUMONIAE: NORMAL
FILM ARRAY CORONAVIRUS 229E: NORMAL
FILM ARRAY CORONAVIRUS HKU1: NORMAL
FILM ARRAY CORONAVIRUS NL63: NORMAL
FILM ARRAY CORONAVIRUS OC43: NORMAL
FILM ARRAY INFLUENZA A VIRUS 09H1: NORMAL
FILM ARRAY INFLUENZA A VIRUS H1: NORMAL
FILM ARRAY INFLUENZA A VIRUS H3: NORMAL
FILM ARRAY INFLUENZA A VIRUS: NORMAL
FILM ARRAY INFLUENZA B: NORMAL
FILM ARRAY METAPNEUMOVIRUS: NORMAL
FILM ARRAY MYCOPLASMA PNEUMONIAE: NORMAL
FILM ARRAY PARAINFLUENZA VIRUS 1: NORMAL
FILM ARRAY PARAINFLUENZA VIRUS 2: NORMAL
FILM ARRAY PARAINFLUENZA VIRUS 3: NORMAL
FILM ARRAY PARAINFLUENZA VIRUS 4: NORMAL
FILM ARRAY RESPIRATORY SYNCITIAL VIRUS: NORMAL
FILM ARRAY RHINOVIRUS/ENTEROVIRUS: NORMAL
GFR AFRICAN AMERICAN: >60
GFR NON-AFRICAN AMERICAN: >60 ML/MIN/1.73
GLUCOSE BLD-MCNC: 100 MG/DL (ref 74–99)
GLUCOSE BLD-MCNC: 100 MG/DL (ref 74–99)
GLUCOSE BLD-MCNC: 102 MG/DL (ref 74–99)
GLUCOSE BLD-MCNC: 111 MG/DL (ref 74–99)
GLUCOSE BLD-MCNC: 111 MG/DL (ref 74–99)
GLUCOSE BLD-MCNC: 116 MG/DL (ref 74–99)
GLUCOSE BLD-MCNC: 119 MG/DL (ref 74–99)
GLUCOSE BLD-MCNC: 127 MG/DL (ref 74–99)
GLUCOSE BLD-MCNC: 157 MG/DL (ref 74–99)
GLUCOSE BLD-MCNC: 176 MG/DL (ref 74–99)
GLUCOSE BLD-MCNC: 84 MG/DL (ref 74–99)
GLUCOSE BLD-MCNC: 85 MG/DL (ref 74–99)
GLUCOSE BLD-MCNC: 88 MG/DL (ref 74–99)
GLUCOSE BLD-MCNC: 89 MG/DL (ref 74–99)
GLUCOSE BLD-MCNC: 90 MG/DL (ref 74–99)
GLUCOSE BLD-MCNC: 91 MG/DL (ref 74–99)
GLUCOSE BLD-MCNC: 93 MG/DL (ref 74–99)
GLUCOSE BLD-MCNC: 97 MG/DL (ref 74–99)
GLUCOSE BLD-MCNC: 98 MG/DL (ref 74–99)
GLUCOSE BLD-MCNC: 98 MG/DL (ref 74–99)
GLUCOSE URINE: NEGATIVE MG/DL
HBA1C MFR BLD: 4.9 % (ref 4–5.6)
HCT VFR BLD CALC: 27.9 % (ref 37–54)
HCT VFR BLD CALC: 28.2 % (ref 37–54)
HCT VFR BLD CALC: 28.2 % (ref 37–54)
HCT VFR BLD CALC: 28.6 % (ref 37–54)
HCT VFR BLD CALC: 28.7 % (ref 37–54)
HCT VFR BLD CALC: 28.8 % (ref 37–54)
HCT VFR BLD CALC: 29.7 % (ref 37–54)
HCT VFR BLD CALC: 29.7 % (ref 37–54)
HCT VFR BLD CALC: 29.8 % (ref 37–54)
HCT VFR BLD CALC: 30.2 % (ref 37–54)
HCT VFR BLD CALC: 30.2 % (ref 37–54)
HCT VFR BLD CALC: 30.4 % (ref 37–54)
HCT VFR BLD CALC: 30.4 % (ref 37–54)
HCT VFR BLD CALC: 30.5 % (ref 37–54)
HCT VFR BLD CALC: 31.6 % (ref 37–54)
HCT VFR BLD CALC: 32 % (ref 37–54)
HCT VFR BLD CALC: 32.1 % (ref 37–54)
HCT VFR BLD CALC: 32.2 % (ref 37–54)
HCT VFR BLD CALC: 33.2 % (ref 37–54)
HCT VFR BLD CALC: 33.8 % (ref 37–54)
HCT VFR BLD CALC: 34 % (ref 37–54)
HDLC SERPL-MCNC: 37 MG/DL
HEMOGLOBIN: 10 G/DL (ref 12.5–16.5)
HEMOGLOBIN: 10.1 G/DL (ref 12.5–16.5)
HEMOGLOBIN: 10.6 G/DL (ref 12.5–16.5)
HEMOGLOBIN: 10.7 G/DL (ref 12.5–16.5)
HEMOGLOBIN: 10.8 G/DL (ref 12.5–16.5)
HEMOGLOBIN: 8.6 G/DL (ref 12.5–16.5)
HEMOGLOBIN: 8.6 G/DL (ref 12.5–16.5)
HEMOGLOBIN: 8.8 G/DL (ref 12.5–16.5)
HEMOGLOBIN: 8.8 G/DL (ref 12.5–16.5)
HEMOGLOBIN: 9 G/DL (ref 12.5–16.5)
HEMOGLOBIN: 9 G/DL (ref 12.5–16.5)
HEMOGLOBIN: 9.1 G/DL (ref 12.5–16.5)
HEMOGLOBIN: 9.1 G/DL (ref 12.5–16.5)
HEMOGLOBIN: 9.2 G/DL (ref 12.5–16.5)
HEMOGLOBIN: 9.3 G/DL (ref 12.5–16.5)
HEMOGLOBIN: 9.4 G/DL (ref 12.5–16.5)
HEMOGLOBIN: 9.6 G/DL (ref 12.5–16.5)
HEMOGLOBIN: 9.7 G/DL (ref 12.5–16.5)
HEMOGLOBIN: 9.9 G/DL (ref 12.5–16.5)
IMMATURE GRANULOCYTES #: 0.01 E9/L
IMMATURE GRANULOCYTES #: 0.01 E9/L
IMMATURE GRANULOCYTES #: 0.02 E9/L
IMMATURE GRANULOCYTES #: 0.02 E9/L
IMMATURE GRANULOCYTES #: 0.03 E9/L
IMMATURE GRANULOCYTES %: 0.2 % (ref 0–5)
IMMATURE GRANULOCYTES %: 0.3 % (ref 0–5)
IMMATURE GRANULOCYTES %: 0.4 % (ref 0–5)
IMMATURE GRANULOCYTES %: 0.6 % (ref 0–5)
IMMATURE GRANULOCYTES %: 0.7 % (ref 0–5)
INFLUENZA A BY PCR: NOT DETECTED
INFLUENZA A BY PCR: NOT DETECTED
INFLUENZA B BY PCR: NOT DETECTED
INFLUENZA B BY PCR: NOT DETECTED
KETONES, URINE: NEGATIVE MG/DL
L. PNEUMOPHILA SEROGP 1 UR AG: NORMAL
L. PNEUMOPHILA SEROGP 1 UR AG: NORMAL
LACTIC ACID, SEPSIS: 2.4 MMOL/L (ref 0.5–1.9)
LACTIC ACID: 0.9 MMOL/L (ref 0.5–2.2)
LACTIC ACID: 1 MMOL/L (ref 0.5–2.2)
LACTIC ACID: 1.1 MMOL/L (ref 0.5–2.2)
LACTIC ACID: 1.3 MMOL/L (ref 0.5–2.2)
LACTIC ACID: 1.5 MMOL/L (ref 0.5–2.2)
LACTIC ACID: 1.6 MMOL/L (ref 0.5–2.2)
LACTIC ACID: 2.5 MMOL/L (ref 0.5–2.2)
LACTIC ACID: 3.9 MMOL/L (ref 0.5–2.2)
LDL CHOLESTEROL CALCULATED: 18 MG/DL (ref 0–99)
LEUKOCYTE ESTERASE, URINE: NEGATIVE
LIPASE: 4 U/L (ref 13–60)
LIPASE: 7 U/L (ref 13–60)
LIPASE: 8 U/L (ref 13–60)
LV EF: 78 %
LVEF MODALITY: NORMAL
LYMPHOCYTES ABSOLUTE: 0.2 E9/L (ref 1.5–4)
LYMPHOCYTES ABSOLUTE: 0.21 E9/L (ref 1.5–4)
LYMPHOCYTES ABSOLUTE: 0.22 E9/L (ref 1.5–4)
LYMPHOCYTES ABSOLUTE: 0.23 E9/L (ref 1.5–4)
LYMPHOCYTES ABSOLUTE: 0.32 E9/L (ref 1.5–4)
LYMPHOCYTES ABSOLUTE: 0.35 E9/L (ref 1.5–4)
LYMPHOCYTES ABSOLUTE: 0.4 E9/L (ref 1.5–4)
LYMPHOCYTES ABSOLUTE: 0.42 E9/L (ref 1.5–4)
LYMPHOCYTES ABSOLUTE: 0.48 E9/L (ref 1.5–4)
LYMPHOCYTES ABSOLUTE: 0.49 E9/L (ref 1.5–4)
LYMPHOCYTES ABSOLUTE: 0.52 E9/L (ref 1.5–4)
LYMPHOCYTES ABSOLUTE: 0.63 E9/L (ref 1.5–4)
LYMPHOCYTES ABSOLUTE: 0.67 E9/L (ref 1.5–4)
LYMPHOCYTES ABSOLUTE: 0.68 E9/L (ref 1.5–4)
LYMPHOCYTES ABSOLUTE: 0.7 E9/L (ref 1.5–4)
LYMPHOCYTES ABSOLUTE: 0.72 E9/L (ref 1.5–4)
LYMPHOCYTES ABSOLUTE: 0.76 E9/L (ref 1.5–4)
LYMPHOCYTES ABSOLUTE: 0.77 E9/L (ref 1.5–4)
LYMPHOCYTES ABSOLUTE: 0.92 E9/L (ref 1.5–4)
LYMPHOCYTES RELATIVE PERCENT: 10.4 % (ref 20–42)
LYMPHOCYTES RELATIVE PERCENT: 11.3 % (ref 20–42)
LYMPHOCYTES RELATIVE PERCENT: 12.4 % (ref 20–42)
LYMPHOCYTES RELATIVE PERCENT: 16.5 % (ref 20–42)
LYMPHOCYTES RELATIVE PERCENT: 19.1 % (ref 20–42)
LYMPHOCYTES RELATIVE PERCENT: 2.7 % (ref 20–42)
LYMPHOCYTES RELATIVE PERCENT: 22.2 % (ref 20–42)
LYMPHOCYTES RELATIVE PERCENT: 25.9 % (ref 20–42)
LYMPHOCYTES RELATIVE PERCENT: 31.9 % (ref 20–42)
LYMPHOCYTES RELATIVE PERCENT: 38 % (ref 20–42)
LYMPHOCYTES RELATIVE PERCENT: 5.2 % (ref 20–42)
LYMPHOCYTES RELATIVE PERCENT: 5.3 % (ref 20–42)
LYMPHOCYTES RELATIVE PERCENT: 5.3 % (ref 20–42)
LYMPHOCYTES RELATIVE PERCENT: 6.1 % (ref 20–42)
LYMPHOCYTES RELATIVE PERCENT: 7 % (ref 20–42)
LYMPHOCYTES RELATIVE PERCENT: 7 % (ref 20–42)
LYMPHOCYTES RELATIVE PERCENT: 8.7 % (ref 20–42)
LYMPHOCYTES RELATIVE PERCENT: 9.4 % (ref 20–42)
LYMPHOCYTES RELATIVE PERCENT: 9.6 % (ref 20–42)
MAGNESIUM: 1.5 MG/DL (ref 1.6–2.6)
MAGNESIUM: 2 MG/DL (ref 1.6–2.6)
MAGNESIUM: 2 MG/DL (ref 1.6–2.6)
MCH RBC QN AUTO: 28.8 PG (ref 26–35)
MCH RBC QN AUTO: 28.9 PG (ref 26–35)
MCH RBC QN AUTO: 29 PG (ref 26–35)
MCH RBC QN AUTO: 29 PG (ref 26–35)
MCH RBC QN AUTO: 29.1 PG (ref 26–35)
MCH RBC QN AUTO: 29.3 PG (ref 26–35)
MCH RBC QN AUTO: 29.4 PG (ref 26–35)
MCH RBC QN AUTO: 29.5 PG (ref 26–35)
MCH RBC QN AUTO: 29.5 PG (ref 26–35)
MCH RBC QN AUTO: 29.8 PG (ref 26–35)
MCH RBC QN AUTO: 30 PG (ref 26–35)
MCH RBC QN AUTO: 30.1 PG (ref 26–35)
MCH RBC QN AUTO: 30.4 PG (ref 26–35)
MCH RBC QN AUTO: 30.4 PG (ref 26–35)
MCH RBC QN AUTO: 30.7 PG (ref 26–35)
MCH RBC QN AUTO: 30.7 PG (ref 26–35)
MCH RBC QN AUTO: 30.9 PG (ref 26–35)
MCH RBC QN AUTO: 31.5 PG (ref 26–35)
MCHC RBC AUTO-ENTMCNC: 30.5 % (ref 32–34.5)
MCHC RBC AUTO-ENTMCNC: 30.5 % (ref 32–34.5)
MCHC RBC AUTO-ENTMCNC: 30.6 % (ref 32–34.5)
MCHC RBC AUTO-ENTMCNC: 30.6 % (ref 32–34.5)
MCHC RBC AUTO-ENTMCNC: 30.7 % (ref 32–34.5)
MCHC RBC AUTO-ENTMCNC: 30.8 % (ref 32–34.5)
MCHC RBC AUTO-ENTMCNC: 30.8 % (ref 32–34.5)
MCHC RBC AUTO-ENTMCNC: 30.9 % (ref 32–34.5)
MCHC RBC AUTO-ENTMCNC: 31.1 % (ref 32–34.5)
MCHC RBC AUTO-ENTMCNC: 31.1 % (ref 32–34.5)
MCHC RBC AUTO-ENTMCNC: 31.2 % (ref 32–34.5)
MCHC RBC AUTO-ENTMCNC: 31.2 % (ref 32–34.5)
MCHC RBC AUTO-ENTMCNC: 31.3 % (ref 32–34.5)
MCHC RBC AUTO-ENTMCNC: 31.4 % (ref 32–34.5)
MCHC RBC AUTO-ENTMCNC: 31.5 % (ref 32–34.5)
MCHC RBC AUTO-ENTMCNC: 31.5 % (ref 32–34.5)
MCHC RBC AUTO-ENTMCNC: 31.6 % (ref 32–34.5)
MCHC RBC AUTO-ENTMCNC: 31.8 % (ref 32–34.5)
MCHC RBC AUTO-ENTMCNC: 31.9 % (ref 32–34.5)
MCHC RBC AUTO-ENTMCNC: 31.9 % (ref 32–34.5)
MCHC RBC AUTO-ENTMCNC: 32 % (ref 32–34.5)
MCV RBC AUTO: 100.4 FL (ref 80–99.9)
MCV RBC AUTO: 100.7 FL (ref 80–99.9)
MCV RBC AUTO: 92.1 FL (ref 80–99.9)
MCV RBC AUTO: 92.3 FL (ref 80–99.9)
MCV RBC AUTO: 92.6 FL (ref 80–99.9)
MCV RBC AUTO: 93.1 FL (ref 80–99.9)
MCV RBC AUTO: 93.4 FL (ref 80–99.9)
MCV RBC AUTO: 93.4 FL (ref 80–99.9)
MCV RBC AUTO: 93.6 FL (ref 80–99.9)
MCV RBC AUTO: 93.8 FL (ref 80–99.9)
MCV RBC AUTO: 93.8 FL (ref 80–99.9)
MCV RBC AUTO: 94.4 FL (ref 80–99.9)
MCV RBC AUTO: 94.6 FL (ref 80–99.9)
MCV RBC AUTO: 96 FL (ref 80–99.9)
MCV RBC AUTO: 96.2 FL (ref 80–99.9)
MCV RBC AUTO: 96.5 FL (ref 80–99.9)
MCV RBC AUTO: 96.9 FL (ref 80–99.9)
MCV RBC AUTO: 97 FL (ref 80–99.9)
MCV RBC AUTO: 98.3 FL (ref 80–99.9)
MCV RBC AUTO: 98.3 FL (ref 80–99.9)
MCV RBC AUTO: 98.8 FL (ref 80–99.9)
MONOCYTES ABSOLUTE: 0 E9/L (ref 0.1–0.95)
MONOCYTES ABSOLUTE: 0.02 E9/L (ref 0.1–0.95)
MONOCYTES ABSOLUTE: 0.04 E9/L (ref 0.1–0.95)
MONOCYTES ABSOLUTE: 0.07 E9/L (ref 0.1–0.95)
MONOCYTES ABSOLUTE: 0.1 E9/L (ref 0.1–0.95)
MONOCYTES ABSOLUTE: 0.12 E9/L (ref 0.1–0.95)
MONOCYTES ABSOLUTE: 0.18 E9/L (ref 0.1–0.95)
MONOCYTES ABSOLUTE: 0.22 E9/L (ref 0.1–0.95)
MONOCYTES ABSOLUTE: 0.28 E9/L (ref 0.1–0.95)
MONOCYTES ABSOLUTE: 0.32 E9/L (ref 0.1–0.95)
MONOCYTES ABSOLUTE: 0.33 E9/L (ref 0.1–0.95)
MONOCYTES ABSOLUTE: 0.46 E9/L (ref 0.1–0.95)
MONOCYTES ABSOLUTE: 0.48 E9/L (ref 0.1–0.95)
MONOCYTES ABSOLUTE: 0.52 E9/L (ref 0.1–0.95)
MONOCYTES ABSOLUTE: 0.58 E9/L (ref 0.1–0.95)
MONOCYTES RELATIVE PERCENT: 0.9 % (ref 2–12)
MONOCYTES RELATIVE PERCENT: 1.3 % (ref 2–12)
MONOCYTES RELATIVE PERCENT: 1.7 % (ref 2–12)
MONOCYTES RELATIVE PERCENT: 11.5 % (ref 2–12)
MONOCYTES RELATIVE PERCENT: 3.5 % (ref 2–12)
MONOCYTES RELATIVE PERCENT: 4.3 % (ref 2–12)
MONOCYTES RELATIVE PERCENT: 5.2 % (ref 2–12)
MONOCYTES RELATIVE PERCENT: 6.3 % (ref 2–12)
MONOCYTES RELATIVE PERCENT: 6.8 % (ref 2–12)
MONOCYTES RELATIVE PERCENT: 7.2 % (ref 2–12)
MONOCYTES RELATIVE PERCENT: 7.8 % (ref 2–12)
MONOCYTES RELATIVE PERCENT: 8 % (ref 2–12)
MONOCYTES RELATIVE PERCENT: 8.8 % (ref 2–12)
NEUTROPHILS ABSOLUTE: 0.66 E9/L (ref 1.8–7.3)
NEUTROPHILS ABSOLUTE: 1.1 E9/L (ref 1.8–7.3)
NEUTROPHILS ABSOLUTE: 1.44 E9/L (ref 1.8–7.3)
NEUTROPHILS ABSOLUTE: 1.64 E9/L (ref 1.8–7.3)
NEUTROPHILS ABSOLUTE: 1.68 E9/L (ref 1.8–7.3)
NEUTROPHILS ABSOLUTE: 1.87 E9/L (ref 1.8–7.3)
NEUTROPHILS ABSOLUTE: 3.4 E9/L (ref 1.8–7.3)
NEUTROPHILS ABSOLUTE: 4.01 E9/L (ref 1.8–7.3)
NEUTROPHILS ABSOLUTE: 4.16 E9/L (ref 1.8–7.3)
NEUTROPHILS ABSOLUTE: 4.18 E9/L (ref 1.8–7.3)
NEUTROPHILS ABSOLUTE: 4.23 E9/L (ref 1.8–7.3)
NEUTROPHILS ABSOLUTE: 4.39 E9/L (ref 1.8–7.3)
NEUTROPHILS ABSOLUTE: 5.95 E9/L (ref 1.8–7.3)
NEUTROPHILS ABSOLUTE: 6.42 E9/L (ref 1.8–7.3)
NEUTROPHILS ABSOLUTE: 6.6 E9/L (ref 1.8–7.3)
NEUTROPHILS ABSOLUTE: 6.67 E9/L (ref 1.8–7.3)
NEUTROPHILS ABSOLUTE: 6.73 E9/L (ref 1.8–7.3)
NEUTROPHILS ABSOLUTE: 7.47 E9/L (ref 1.8–7.3)
NEUTROPHILS ABSOLUTE: 9.02 E9/L (ref 1.8–7.3)
NEUTROPHILS RELATIVE PERCENT: 51 % (ref 43–80)
NEUTROPHILS RELATIVE PERCENT: 59 % (ref 43–80)
NEUTROPHILS RELATIVE PERCENT: 60.3 % (ref 43–80)
NEUTROPHILS RELATIVE PERCENT: 69.6 % (ref 43–80)
NEUTROPHILS RELATIVE PERCENT: 76.5 % (ref 43–80)
NEUTROPHILS RELATIVE PERCENT: 80 % (ref 43–80)
NEUTROPHILS RELATIVE PERCENT: 82.1 % (ref 43–80)
NEUTROPHILS RELATIVE PERCENT: 83.4 % (ref 43–80)
NEUTROPHILS RELATIVE PERCENT: 85.2 % (ref 43–80)
NEUTROPHILS RELATIVE PERCENT: 88.6 % (ref 43–80)
NEUTROPHILS RELATIVE PERCENT: 88.7 % (ref 43–80)
NEUTROPHILS RELATIVE PERCENT: 89.5 % (ref 43–80)
NEUTROPHILS RELATIVE PERCENT: 91.3 % (ref 43–80)
NEUTROPHILS RELATIVE PERCENT: 92.1 % (ref 43–80)
NEUTROPHILS RELATIVE PERCENT: 93.9 % (ref 43–80)
NEUTROPHILS RELATIVE PERCENT: 93.9 % (ref 43–80)
NEUTROPHILS RELATIVE PERCENT: 96.5 % (ref 43–80)
NITRITE, URINE: NEGATIVE
ORGANISM: ABNORMAL
OVALOCYTES: ABNORMAL
PDW BLD-RTO: 15.3 FL (ref 11.5–15)
PDW BLD-RTO: 15.4 FL (ref 11.5–15)
PDW BLD-RTO: 15.4 FL (ref 11.5–15)
PDW BLD-RTO: 15.6 FL (ref 11.5–15)
PDW BLD-RTO: 16.1 FL (ref 11.5–15)
PDW BLD-RTO: 16.1 FL (ref 11.5–15)
PDW BLD-RTO: 16.2 FL (ref 11.5–15)
PDW BLD-RTO: 16.3 FL (ref 11.5–15)
PDW BLD-RTO: 17 FL (ref 11.5–15)
PDW BLD-RTO: 17.1 FL (ref 11.5–15)
PDW BLD-RTO: 17.2 FL (ref 11.5–15)
PDW BLD-RTO: 18.6 FL (ref 11.5–15)
PDW BLD-RTO: 18.6 FL (ref 11.5–15)
PDW BLD-RTO: 18.9 FL (ref 11.5–15)
PDW BLD-RTO: 18.9 FL (ref 11.5–15)
PH UA: 6 (ref 5–9)
PH UA: 6.5 (ref 5–9)
PH UA: 6.5 (ref 5–9)
PH UA: 7 (ref 5–9)
PH UA: 7 (ref 5–9)
PH UA: 8 (ref 5–9)
PH UA: 8.5 (ref 5–9)
PH UA: 8.5 (ref 5–9)
PHOSPHORUS: 1.6 MG/DL (ref 2.5–4.5)
PHOSPHORUS: 2.2 MG/DL (ref 2.5–4.5)
PHOSPHORUS: 2.5 MG/DL (ref 2.5–4.5)
PLATELET # BLD: 118 E9/L (ref 130–450)
PLATELET # BLD: 127 E9/L (ref 130–450)
PLATELET # BLD: 134 E9/L (ref 130–450)
PLATELET # BLD: 143 E9/L (ref 130–450)
PLATELET # BLD: 156 E9/L (ref 130–450)
PLATELET # BLD: 161 E9/L (ref 130–450)
PLATELET # BLD: 162 E9/L (ref 130–450)
PLATELET # BLD: 163 E9/L (ref 130–450)
PLATELET # BLD: 166 E9/L (ref 130–450)
PLATELET # BLD: 167 E9/L (ref 130–450)
PLATELET # BLD: 169 E9/L (ref 130–450)
PLATELET # BLD: 169 E9/L (ref 130–450)
PLATELET # BLD: 181 E9/L (ref 130–450)
PLATELET # BLD: 191 E9/L (ref 130–450)
PLATELET # BLD: 197 E9/L (ref 130–450)
PLATELET # BLD: 197 E9/L (ref 130–450)
PLATELET # BLD: 230 E9/L (ref 130–450)
PLATELET # BLD: 73 E9/L (ref 130–450)
PLATELET # BLD: 74 E9/L (ref 130–450)
PLATELET # BLD: 79 E9/L (ref 130–450)
PLATELET # BLD: 98 E9/L (ref 130–450)
PLATELET CONFIRMATION: NORMAL
PMV BLD AUTO: 10.4 FL (ref 7–12)
PMV BLD AUTO: 10.4 FL (ref 7–12)
PMV BLD AUTO: 10.5 FL (ref 7–12)
PMV BLD AUTO: 10.7 FL (ref 7–12)
PMV BLD AUTO: 11 FL (ref 7–12)
PMV BLD AUTO: 11.1 FL (ref 7–12)
PMV BLD AUTO: 11.3 FL (ref 7–12)
PMV BLD AUTO: 9 FL (ref 7–12)
PMV BLD AUTO: 9.1 FL (ref 7–12)
PMV BLD AUTO: 9.4 FL (ref 7–12)
PMV BLD AUTO: 9.5 FL (ref 7–12)
PMV BLD AUTO: 9.5 FL (ref 7–12)
PMV BLD AUTO: 9.6 FL (ref 7–12)
PMV BLD AUTO: 9.6 FL (ref 7–12)
PMV BLD AUTO: 9.7 FL (ref 7–12)
PMV BLD AUTO: 9.8 FL (ref 7–12)
PMV BLD AUTO: 9.8 FL (ref 7–12)
PMV BLD AUTO: 9.9 FL (ref 7–12)
PMV BLD AUTO: 9.9 FL (ref 7–12)
POIKILOCYTES: ABNORMAL
POLYCHROMASIA: ABNORMAL
POTASSIUM REFLEX MAGNESIUM: 4.6 MMOL/L (ref 3.5–5)
POTASSIUM SERPL-SCNC: 3.4 MMOL/L (ref 3.5–5)
POTASSIUM SERPL-SCNC: 3.6 MMOL/L (ref 3.5–5)
POTASSIUM SERPL-SCNC: 3.6 MMOL/L (ref 3.5–5)
POTASSIUM SERPL-SCNC: 3.7 MMOL/L (ref 3.5–5)
POTASSIUM SERPL-SCNC: 3.8 MMOL/L (ref 3.5–5)
POTASSIUM SERPL-SCNC: 3.9 MMOL/L (ref 3.5–5)
POTASSIUM SERPL-SCNC: 4.1 MMOL/L (ref 3.5–5)
POTASSIUM SERPL-SCNC: 4.2 MMOL/L (ref 3.5–5)
POTASSIUM SERPL-SCNC: 4.3 MMOL/L (ref 3.5–5)
POTASSIUM SERPL-SCNC: 4.3 MMOL/L (ref 3.5–5)
POTASSIUM SERPL-SCNC: 4.4 MMOL/L (ref 3.5–5)
POTASSIUM SERPL-SCNC: 4.6 MMOL/L (ref 3.5–5)
POTASSIUM SERPL-SCNC: 4.8 MMOL/L (ref 3.5–5)
PROCALCITONIN: 0.18 NG/ML (ref 0–0.08)
PROCALCITONIN: 0.48 NG/ML (ref 0–0.08)
PROCALCITONIN: 5.58 NG/ML (ref 0–0.08)
PROTEIN UA: 100 MG/DL
PROTEIN UA: NEGATIVE MG/DL
PROTEIN UA: NORMAL MG/DL
RBC # BLD: 2.78 E12/L (ref 3.8–5.8)
RBC # BLD: 2.8 E12/L (ref 3.8–5.8)
RBC # BLD: 2.95 E12/L (ref 3.8–5.8)
RBC # BLD: 2.96 E12/L (ref 3.8–5.8)
RBC # BLD: 3.02 E12/L (ref 3.8–5.8)
RBC # BLD: 3.02 E12/L (ref 3.8–5.8)
RBC # BLD: 3.03 E12/L (ref 3.8–5.8)
RBC # BLD: 3.07 E12/L (ref 3.8–5.8)
RBC # BLD: 3.13 E12/L (ref 3.8–5.8)
RBC # BLD: 3.14 E12/L (ref 3.8–5.8)
RBC # BLD: 3.16 E12/L (ref 3.8–5.8)
RBC # BLD: 3.23 E12/L (ref 3.8–5.8)
RBC # BLD: 3.26 E12/L (ref 3.8–5.8)
RBC # BLD: 3.29 E12/L (ref 3.8–5.8)
RBC # BLD: 3.3 E12/L (ref 3.8–5.8)
RBC # BLD: 3.36 E12/L (ref 3.8–5.8)
RBC # BLD: 3.41 E12/L (ref 3.8–5.8)
RBC # BLD: 3.43 E12/L (ref 3.8–5.8)
RBC # BLD: 3.46 E12/L (ref 3.8–5.8)
RBC # BLD: 3.64 E12/L (ref 3.8–5.8)
RBC # BLD: 3.66 E12/L (ref 3.8–5.8)
RBC # BLD: NORMAL 10*6/UL
RBC UA: NORMAL /HPF (ref 0–2)
RBC UA: NORMAL /HPF (ref 0–2)
REASON FOR REJECTION: NORMAL
REJECTED TEST: NORMAL
SCHISTOCYTES: ABNORMAL
SEDIMENTATION RATE, ERYTHROCYTE: 55 MM/HR (ref 0–15)
SEDIMENTATION RATE, ERYTHROCYTE: 85 MM/HR (ref 0–15)
SODIUM BLD-SCNC: 136 MMOL/L (ref 132–146)
SODIUM BLD-SCNC: 136 MMOL/L (ref 132–146)
SODIUM BLD-SCNC: 137 MMOL/L (ref 132–146)
SODIUM BLD-SCNC: 137 MMOL/L (ref 132–146)
SODIUM BLD-SCNC: 138 MMOL/L (ref 132–146)
SODIUM BLD-SCNC: 139 MMOL/L (ref 132–146)
SODIUM BLD-SCNC: 140 MMOL/L (ref 132–146)
SODIUM BLD-SCNC: 141 MMOL/L (ref 132–146)
SODIUM BLD-SCNC: 142 MMOL/L (ref 132–146)
SODIUM BLD-SCNC: 143 MMOL/L (ref 132–146)
SPECIFIC GRAVITY UA: 1.01 (ref 1–1.03)
SPECIFIC GRAVITY UA: <=1.005 (ref 1–1.03)
STREP PNEUMONIAE ANTIGEN, URINE: NORMAL
STREP PNEUMONIAE ANTIGEN, URINE: NORMAL
TEAR DROP CELLS: ABNORMAL
TOTAL PROTEIN: 5.4 G/DL (ref 6.4–8.3)
TOTAL PROTEIN: 5.7 G/DL (ref 6.4–8.3)
TOTAL PROTEIN: 6 G/DL (ref 6.4–8.3)
TOTAL PROTEIN: 6.1 G/DL (ref 6.4–8.3)
TOTAL PROTEIN: 6.2 G/DL (ref 6.4–8.3)
TOTAL PROTEIN: 6.3 G/DL (ref 6.4–8.3)
TOTAL PROTEIN: 6.4 G/DL (ref 6.4–8.3)
TOTAL PROTEIN: 6.5 G/DL (ref 6.4–8.3)
TOTAL PROTEIN: 6.5 G/DL (ref 6.4–8.3)
TOTAL PROTEIN: 6.6 G/DL (ref 6.4–8.3)
TOTAL PROTEIN: 6.6 G/DL (ref 6.4–8.3)
TOTAL PROTEIN: 6.7 G/DL (ref 6.4–8.3)
TOTAL PROTEIN: 7.4 G/DL (ref 6.4–8.3)
TOXIC GRANULATION: ABNORMAL
TRIGL SERPL-MCNC: 85 MG/DL (ref 0–149)
TROPONIN: <0.01 NG/ML (ref 0–0.03)
TSH SERPL DL<=0.05 MIU/L-ACNC: 2.57 UIU/ML (ref 0.27–4.2)
URINE CULTURE, ROUTINE: NORMAL
UROBILINOGEN, URINE: 0.2 E.U./DL
UROBILINOGEN, URINE: 1 E.U./DL
VANCOMYCIN TROUGH: 13.4 MCG/ML (ref 5–16)
VANCOMYCIN TROUGH: 16.1 MCG/ML (ref 5–16)
VANCOMYCIN TROUGH: 8.8 MCG/ML (ref 5–16)
VLDLC SERPL CALC-MCNC: 17 MG/DL
WBC # BLD: 1.3 E9/L (ref 4.5–11.5)
WBC # BLD: 1.6 E9/L (ref 4.5–11.5)
WBC # BLD: 2.1 E9/L (ref 4.5–11.5)
WBC # BLD: 2.1 E9/L (ref 4.5–11.5)
WBC # BLD: 2.4 E9/L (ref 4.5–11.5)
WBC # BLD: 2.8 E9/L (ref 4.5–11.5)
WBC # BLD: 3.5 E9/L (ref 4.5–11.5)
WBC # BLD: 3.7 E9/L (ref 4.5–11.5)
WBC # BLD: 4.5 E9/L (ref 4.5–11.5)
WBC # BLD: 4.5 E9/L (ref 4.5–11.5)
WBC # BLD: 5.2 E9/L (ref 4.5–11.5)
WBC # BLD: 5.4 E9/L (ref 4.5–11.5)
WBC # BLD: 5.5 E9/L (ref 4.5–11.5)
WBC # BLD: 5.7 E9/L (ref 4.5–11.5)
WBC # BLD: 6.8 E9/L (ref 4.5–11.5)
WBC # BLD: 7 E9/L (ref 4.5–11.5)
WBC # BLD: 7.4 E9/L (ref 4.5–11.5)
WBC # BLD: 7.7 E9/L (ref 4.5–11.5)
WBC # BLD: 8.1 E9/L (ref 4.5–11.5)
WBC # BLD: 8.3 E9/L (ref 4.5–11.5)
WBC # BLD: 9.6 E9/L (ref 4.5–11.5)
WBC UA: NORMAL /HPF (ref 0–5)
WBC UA: NORMAL /HPF (ref 0–5)

## 2019-01-01 PROCEDURE — 1200000000 HC SEMI PRIVATE

## 2019-01-01 PROCEDURE — 81003 URINALYSIS AUTO W/O SCOPE: CPT

## 2019-01-01 PROCEDURE — 6370000000 HC RX 637 (ALT 250 FOR IP): Performed by: INTERNAL MEDICINE

## 2019-01-01 PROCEDURE — 83690 ASSAY OF LIPASE: CPT

## 2019-01-01 PROCEDURE — 36415 COLL VENOUS BLD VENIPUNCTURE: CPT

## 2019-01-01 PROCEDURE — 85025 COMPLETE CBC W/AUTO DIFF WBC: CPT

## 2019-01-01 PROCEDURE — 2580000003 HC RX 258: Performed by: INTERNAL MEDICINE

## 2019-01-01 PROCEDURE — 2580000003 HC RX 258: Performed by: SPECIALIST

## 2019-01-01 PROCEDURE — 74177 CT ABD & PELVIS W/CONTRAST: CPT

## 2019-01-01 PROCEDURE — 36591 DRAW BLOOD OFF VENOUS DEVICE: CPT

## 2019-01-01 PROCEDURE — 87581 M.PNEUMON DNA AMP PROBE: CPT

## 2019-01-01 PROCEDURE — 6360000002 HC RX W HCPCS: Performed by: EMERGENCY MEDICINE

## 2019-01-01 PROCEDURE — 93010 ELECTROCARDIOGRAM REPORT: CPT | Performed by: INTERNAL MEDICINE

## 2019-01-01 PROCEDURE — 6370000000 HC RX 637 (ALT 250 FOR IP): Performed by: NURSE PRACTITIONER

## 2019-01-01 PROCEDURE — 87088 URINE BACTERIA CULTURE: CPT

## 2019-01-01 PROCEDURE — 71045 X-RAY EXAM CHEST 1 VIEW: CPT

## 2019-01-01 PROCEDURE — 2060000000 HC ICU INTERMEDIATE R&B

## 2019-01-01 PROCEDURE — 80202 ASSAY OF VANCOMYCIN: CPT

## 2019-01-01 PROCEDURE — 2500000003 HC RX 250 WO HCPCS: Performed by: SPECIALIST

## 2019-01-01 PROCEDURE — 80048 BASIC METABOLIC PNL TOTAL CA: CPT

## 2019-01-01 PROCEDURE — 80053 COMPREHEN METABOLIC PANEL: CPT

## 2019-01-01 PROCEDURE — 6360000002 HC RX W HCPCS: Performed by: INTERNAL MEDICINE

## 2019-01-01 PROCEDURE — 84145 PROCALCITONIN (PCT): CPT

## 2019-01-01 PROCEDURE — 83735 ASSAY OF MAGNESIUM: CPT

## 2019-01-01 PROCEDURE — 87040 BLOOD CULTURE FOR BACTERIA: CPT

## 2019-01-01 PROCEDURE — 96367 TX/PROPH/DG ADDL SEQ IV INF: CPT

## 2019-01-01 PROCEDURE — 84100 ASSAY OF PHOSPHORUS: CPT

## 2019-01-01 PROCEDURE — 83605 ASSAY OF LACTIC ACID: CPT

## 2019-01-01 PROCEDURE — 96374 THER/PROPH/DIAG INJ IV PUSH: CPT

## 2019-01-01 PROCEDURE — 6370000000 HC RX 637 (ALT 250 FOR IP): Performed by: FAMILY MEDICINE

## 2019-01-01 PROCEDURE — A9577 INJ MULTIHANCE: HCPCS | Performed by: RADIOLOGY

## 2019-01-01 PROCEDURE — 81001 URINALYSIS AUTO W/SCOPE: CPT

## 2019-01-01 PROCEDURE — 93005 ELECTROCARDIOGRAM TRACING: CPT | Performed by: EMERGENCY MEDICINE

## 2019-01-01 PROCEDURE — 2580000003 HC RX 258: Performed by: FAMILY MEDICINE

## 2019-01-01 PROCEDURE — 99284 EMERGENCY DEPT VISIT MOD MDM: CPT

## 2019-01-01 PROCEDURE — 87798 DETECT AGENT NOS DNA AMP: CPT

## 2019-01-01 PROCEDURE — 2580000003 HC RX 258: Performed by: EMERGENCY MEDICINE

## 2019-01-01 PROCEDURE — 84484 ASSAY OF TROPONIN QUANT: CPT

## 2019-01-01 PROCEDURE — 76705 ECHO EXAM OF ABDOMEN: CPT

## 2019-01-01 PROCEDURE — 87633 RESP VIRUS 12-25 TARGETS: CPT

## 2019-01-01 PROCEDURE — 70450 CT HEAD/BRAIN W/O DYE: CPT

## 2019-01-01 PROCEDURE — 87450 HC DIRECT STREP B ANTIGEN: CPT

## 2019-01-01 PROCEDURE — 36592 COLLECT BLOOD FROM PICC: CPT

## 2019-01-01 PROCEDURE — 6360000004 HC RX CONTRAST MEDICATION: Performed by: RADIOLOGY

## 2019-01-01 PROCEDURE — 96368 THER/DIAG CONCURRENT INF: CPT

## 2019-01-01 PROCEDURE — 87186 SC STD MICRODIL/AGAR DIL: CPT

## 2019-01-01 PROCEDURE — 86140 C-REACTIVE PROTEIN: CPT

## 2019-01-01 PROCEDURE — 87502 INFLUENZA DNA AMP PROBE: CPT

## 2019-01-01 PROCEDURE — 99285 EMERGENCY DEPT VISIT HI MDM: CPT

## 2019-01-01 PROCEDURE — 87486 CHLMYD PNEUM DNA AMP PROBE: CPT

## 2019-01-01 PROCEDURE — 85027 COMPLETE CBC AUTOMATED: CPT

## 2019-01-01 PROCEDURE — B246ZZZ ULTRASONOGRAPHY OF RIGHT AND LEFT HEART: ICD-10-PCS | Performed by: INTERNAL MEDICINE

## 2019-01-01 PROCEDURE — 6360000002 HC RX W HCPCS: Performed by: STUDENT IN AN ORGANIZED HEALTH CARE EDUCATION/TRAINING PROGRAM

## 2019-01-01 PROCEDURE — 6360000002 HC RX W HCPCS: Performed by: FAMILY MEDICINE

## 2019-01-01 PROCEDURE — 6370000000 HC RX 637 (ALT 250 FOR IP): Performed by: PHYSICIAN ASSISTANT

## 2019-01-01 PROCEDURE — 6360000002 HC RX W HCPCS: Performed by: PHYSICIAN ASSISTANT

## 2019-01-01 PROCEDURE — 74183 MRI ABD W/O CNTR FLWD CNTR: CPT

## 2019-01-01 PROCEDURE — 96365 THER/PROPH/DIAG IV INF INIT: CPT

## 2019-01-01 PROCEDURE — 6370000000 HC RX 637 (ALT 250 FOR IP): Performed by: EMERGENCY MEDICINE

## 2019-01-01 PROCEDURE — 6360000002 HC RX W HCPCS

## 2019-01-01 PROCEDURE — 2580000003 HC RX 258: Performed by: PHYSICIAN ASSISTANT

## 2019-01-01 PROCEDURE — 6360000002 HC RX W HCPCS: Performed by: SPECIALIST

## 2019-01-01 PROCEDURE — 87077 CULTURE AEROBIC IDENTIFY: CPT

## 2019-01-01 PROCEDURE — 85651 RBC SED RATE NONAUTOMATED: CPT

## 2019-01-01 PROCEDURE — 94761 N-INVAS EAR/PLS OXIMETRY MLT: CPT

## 2019-01-01 PROCEDURE — 6370000000 HC RX 637 (ALT 250 FOR IP): Performed by: HOSPITALIST

## 2019-01-01 PROCEDURE — 96366 THER/PROPH/DIAG IV INF ADDON: CPT

## 2019-01-01 PROCEDURE — 6370000000 HC RX 637 (ALT 250 FOR IP): Performed by: STUDENT IN AN ORGANIZED HEALTH CARE EDUCATION/TRAINING PROGRAM

## 2019-01-01 PROCEDURE — 2580000003 HC RX 258: Performed by: STUDENT IN AN ORGANIZED HEALTH CARE EDUCATION/TRAINING PROGRAM

## 2019-01-01 PROCEDURE — 84443 ASSAY THYROID STIM HORMONE: CPT

## 2019-01-01 PROCEDURE — 96375 TX/PRO/DX INJ NEW DRUG ADDON: CPT

## 2019-01-01 PROCEDURE — 80061 LIPID PANEL: CPT

## 2019-01-01 PROCEDURE — 97161 PT EVAL LOW COMPLEX 20 MIN: CPT | Performed by: PHYSICAL THERAPIST

## 2019-01-01 PROCEDURE — 83036 HEMOGLOBIN GLYCOSYLATED A1C: CPT

## 2019-01-01 PROCEDURE — 71260 CT THORAX DX C+: CPT

## 2019-01-01 PROCEDURE — 93306 TTE W/DOPPLER COMPLETE: CPT

## 2019-01-01 PROCEDURE — 74174 CTA ABD&PLVS W/CONTRAST: CPT

## 2019-01-01 PROCEDURE — 93005 ELECTROCARDIOGRAM TRACING: CPT | Performed by: STUDENT IN AN ORGANIZED HEALTH CARE EDUCATION/TRAINING PROGRAM

## 2019-01-01 RX ORDER — ESCITALOPRAM OXALATE 10 MG/1
5 TABLET ORAL DAILY
Status: DISCONTINUED | OUTPATIENT
Start: 2019-01-01 | End: 2019-01-01 | Stop reason: HOSPADM

## 2019-01-01 RX ORDER — KETOROLAC TROMETHAMINE 15 MG/ML
15 INJECTION, SOLUTION INTRAMUSCULAR; INTRAVENOUS ONCE
Status: COMPLETED | OUTPATIENT
Start: 2019-01-01 | End: 2019-01-01

## 2019-01-01 RX ORDER — ACETAMINOPHEN 500 MG
1000 TABLET ORAL ONCE
Status: COMPLETED | OUTPATIENT
Start: 2019-01-01 | End: 2019-01-01

## 2019-01-01 RX ORDER — LEVOFLOXACIN 500 MG/1
500 TABLET, FILM COATED ORAL DAILY
Status: DISCONTINUED | OUTPATIENT
Start: 2019-01-01 | End: 2019-01-01

## 2019-01-01 RX ORDER — PANTOPRAZOLE SODIUM 40 MG/1
40 TABLET, DELAYED RELEASE ORAL DAILY
Status: DISCONTINUED | OUTPATIENT
Start: 2019-01-01 | End: 2019-01-01 | Stop reason: HOSPADM

## 2019-01-01 RX ORDER — LEVOFLOXACIN 750 MG/1
750 TABLET ORAL DAILY
Status: DISCONTINUED | OUTPATIENT
Start: 2019-01-01 | End: 2019-01-01 | Stop reason: HOSPADM

## 2019-01-01 RX ORDER — LEVOFLOXACIN 500 MG/1
500 TABLET, FILM COATED ORAL DAILY
Qty: 7 TABLET | Refills: 0 | Status: ON HOLD | OUTPATIENT
Start: 2019-01-01 | End: 2019-01-01 | Stop reason: HOSPADM

## 2019-01-01 RX ORDER — ACETAMINOPHEN 325 MG/1
650 TABLET ORAL EVERY 4 HOURS PRN
Status: DISCONTINUED | OUTPATIENT
Start: 2019-01-01 | End: 2019-01-01 | Stop reason: HOSPADM

## 2019-01-01 RX ORDER — SODIUM CHLORIDE 0.9 % (FLUSH) 0.9 %
10 SYRINGE (ML) INJECTION EVERY 12 HOURS SCHEDULED
Status: DISCONTINUED | OUTPATIENT
Start: 2019-01-01 | End: 2019-01-01 | Stop reason: HOSPADM

## 2019-01-01 RX ORDER — ONDANSETRON 2 MG/ML
4 INJECTION INTRAMUSCULAR; INTRAVENOUS EVERY 6 HOURS PRN
Status: DISCONTINUED | OUTPATIENT
Start: 2019-01-01 | End: 2019-01-01 | Stop reason: HOSPADM

## 2019-01-01 RX ORDER — DIPHENOXYLATE HYDROCHLORIDE AND ATROPINE SULFATE 2.5; .025 MG/1; MG/1
1 TABLET ORAL 4 TIMES DAILY PRN
Status: DISCONTINUED | OUTPATIENT
Start: 2019-01-01 | End: 2019-01-01 | Stop reason: HOSPADM

## 2019-01-01 RX ORDER — IBUPROFEN 400 MG/1
400 TABLET ORAL EVERY 6 HOURS PRN
Status: DISCONTINUED | OUTPATIENT
Start: 2019-01-01 | End: 2019-01-01 | Stop reason: HOSPADM

## 2019-01-01 RX ORDER — SODIUM CHLORIDE, SODIUM LACTATE, POTASSIUM CHLORIDE, CALCIUM CHLORIDE 600; 310; 30; 20 MG/100ML; MG/100ML; MG/100ML; MG/100ML
INJECTION, SOLUTION INTRAVENOUS CONTINUOUS
Status: DISCONTINUED | OUTPATIENT
Start: 2019-01-01 | End: 2019-01-01 | Stop reason: HOSPADM

## 2019-01-01 RX ORDER — SODIUM CHLORIDE, SODIUM LACTATE, POTASSIUM CHLORIDE, CALCIUM CHLORIDE 600; 310; 30; 20 MG/100ML; MG/100ML; MG/100ML; MG/100ML
INJECTION, SOLUTION INTRAVENOUS CONTINUOUS
Status: DISCONTINUED | OUTPATIENT
Start: 2019-01-01 | End: 2019-01-01

## 2019-01-01 RX ORDER — ALPRAZOLAM 1 MG/1
1 TABLET ORAL
Status: DISCONTINUED | OUTPATIENT
Start: 2019-01-01 | End: 2019-01-01

## 2019-01-01 RX ORDER — WARFARIN SODIUM 4 MG/1
4 TABLET ORAL
COMMUNITY
End: 2019-01-01 | Stop reason: ALTCHOICE

## 2019-01-01 RX ORDER — LORAZEPAM 1 MG/1
1 TABLET ORAL NIGHTLY PRN
Status: DISCONTINUED | OUTPATIENT
Start: 2019-01-01 | End: 2019-01-01 | Stop reason: HOSPADM

## 2019-01-01 RX ORDER — LORAZEPAM 1 MG/1
2 TABLET ORAL ONCE
Status: COMPLETED | OUTPATIENT
Start: 2019-01-01 | End: 2019-01-01

## 2019-01-01 RX ORDER — PROMETHAZINE HYDROCHLORIDE 25 MG/1
25 TABLET ORAL EVERY 6 HOURS PRN
Status: DISCONTINUED | OUTPATIENT
Start: 2019-01-01 | End: 2019-01-01 | Stop reason: HOSPADM

## 2019-01-01 RX ORDER — SENNA AND DOCUSATE SODIUM 50; 8.6 MG/1; MG/1
1 TABLET, FILM COATED ORAL DAILY PRN
Status: DISCONTINUED | OUTPATIENT
Start: 2019-01-01 | End: 2019-01-01 | Stop reason: HOSPADM

## 2019-01-01 RX ORDER — LORAZEPAM 2 MG/ML
0.5 INJECTION INTRAMUSCULAR NIGHTLY PRN
Status: DISCONTINUED | OUTPATIENT
Start: 2019-01-01 | End: 2019-01-01 | Stop reason: HOSPADM

## 2019-01-01 RX ORDER — SODIUM CHLORIDE 0.9 % (FLUSH) 0.9 %
10 SYRINGE (ML) INJECTION PRN
Status: DISCONTINUED | OUTPATIENT
Start: 2019-01-01 | End: 2019-01-01 | Stop reason: HOSPADM

## 2019-01-01 RX ORDER — LORAZEPAM 2 MG/ML
1 INJECTION INTRAMUSCULAR EVERY 6 HOURS PRN
Status: DISCONTINUED | OUTPATIENT
Start: 2019-01-01 | End: 2019-01-01 | Stop reason: HOSPADM

## 2019-01-01 RX ORDER — GABAPENTIN 300 MG/1
300 CAPSULE ORAL 2 TIMES DAILY
Status: DISCONTINUED | OUTPATIENT
Start: 2019-01-01 | End: 2019-01-01 | Stop reason: HOSPADM

## 2019-01-01 RX ORDER — POTASSIUM CHLORIDE AND SODIUM CHLORIDE 900; 300 MG/100ML; MG/100ML
INJECTION, SOLUTION INTRAVENOUS CONTINUOUS
Status: DISCONTINUED | OUTPATIENT
Start: 2019-01-01 | End: 2019-01-01 | Stop reason: HOSPADM

## 2019-01-01 RX ORDER — SODIUM CHLORIDE 9 MG/ML
INJECTION, SOLUTION INTRAVENOUS CONTINUOUS
Status: DISCONTINUED | OUTPATIENT
Start: 2019-01-01 | End: 2019-01-01

## 2019-01-01 RX ORDER — DOXYCYCLINE HYCLATE 100 MG/1
100 CAPSULE ORAL EVERY 12 HOURS SCHEDULED
Status: DISCONTINUED | OUTPATIENT
Start: 2019-01-01 | End: 2019-01-01

## 2019-01-01 RX ORDER — LORATADINE 10 MG/1
10 TABLET, ORALLY DISINTEGRATING ORAL SEE ADMIN INSTRUCTIONS
Status: ON HOLD | COMMUNITY
End: 2019-01-01 | Stop reason: HOSPADM

## 2019-01-01 RX ORDER — OXYCODONE HYDROCHLORIDE 5 MG/1
5 TABLET ORAL EVERY 4 HOURS PRN
Status: DISCONTINUED | OUTPATIENT
Start: 2019-01-01 | End: 2019-01-01 | Stop reason: HOSPADM

## 2019-01-01 RX ORDER — IBUPROFEN 400 MG/1
400 TABLET ORAL EVERY 6 HOURS PRN
Status: DISCONTINUED | OUTPATIENT
Start: 2019-01-01 | End: 2019-01-01 | Stop reason: CLARIF

## 2019-01-01 RX ORDER — 0.9 % SODIUM CHLORIDE 0.9 %
1000 INTRAVENOUS SOLUTION INTRAVENOUS ONCE
Status: COMPLETED | OUTPATIENT
Start: 2019-01-01 | End: 2019-01-01

## 2019-01-01 RX ORDER — ZOLPIDEM TARTRATE 5 MG/1
10 TABLET ORAL NIGHTLY PRN
Status: DISCONTINUED | OUTPATIENT
Start: 2019-01-01 | End: 2019-01-01 | Stop reason: HOSPADM

## 2019-01-01 RX ORDER — LIDOCAINE AND PRILOCAINE 25; 25 MG/G; MG/G
1 CREAM TOPICAL DAILY PRN
Status: DISCONTINUED | OUTPATIENT
Start: 2019-01-01 | End: 2019-01-01 | Stop reason: HOSPADM

## 2019-01-01 RX ORDER — AMOXICILLIN AND CLAVULANATE POTASSIUM 875; 125 MG/1; MG/1
1 TABLET, FILM COATED ORAL EVERY 12 HOURS SCHEDULED
Status: DISCONTINUED | OUTPATIENT
Start: 2019-01-01 | End: 2019-01-01 | Stop reason: HOSPADM

## 2019-01-01 RX ORDER — AMOXICILLIN 250 MG
1 CAPSULE ORAL DAILY PRN
Status: ON HOLD | COMMUNITY
End: 2020-01-01 | Stop reason: HOSPADM

## 2019-01-01 RX ORDER — IBUPROFEN 400 MG/1
400 TABLET ORAL ONCE
Status: COMPLETED | OUTPATIENT
Start: 2019-01-01 | End: 2019-01-01

## 2019-01-01 RX ORDER — SODIUM CHLORIDE 9 MG/ML
INJECTION, SOLUTION INTRAVENOUS ONCE
Status: COMPLETED | OUTPATIENT
Start: 2019-01-01 | End: 2019-01-01

## 2019-01-01 RX ORDER — PIPERACILLIN SODIUM, TAZOBACTAM SODIUM 3; .375 G/15ML; G/15ML
3.38 INJECTION, POWDER, LYOPHILIZED, FOR SOLUTION INTRAVENOUS EVERY 6 HOURS
Qty: 1 EACH | Refills: 0
Start: 2019-01-01 | End: 2019-01-01 | Stop reason: ALTCHOICE

## 2019-01-01 RX ORDER — ACETAMINOPHEN 500 MG
500 TABLET ORAL ONCE
Status: COMPLETED | OUTPATIENT
Start: 2019-01-01 | End: 2019-01-01

## 2019-01-01 RX ORDER — IBUPROFEN 800 MG/1
800 TABLET ORAL ONCE
Status: COMPLETED | OUTPATIENT
Start: 2019-01-01 | End: 2019-01-01

## 2019-01-01 RX ORDER — ONDANSETRON 2 MG/ML
8 INJECTION INTRAMUSCULAR; INTRAVENOUS ONCE
Status: COMPLETED | OUTPATIENT
Start: 2019-01-01 | End: 2019-01-01

## 2019-01-01 RX ORDER — LEUCOVORIN CALCIUM 10 MG/ML
700 INJECTION, SOLUTION INTRAMUSCULAR; INTRAVENOUS
Status: ON HOLD | COMMUNITY
End: 2019-01-01 | Stop reason: ALTCHOICE

## 2019-01-01 RX ORDER — SULFAMETHOXAZOLE AND TRIMETHOPRIM 800; 160 MG/1; MG/1
1 TABLET ORAL 2 TIMES DAILY
Qty: 20 TABLET | Refills: 0 | Status: SHIPPED | OUTPATIENT
Start: 2019-01-01 | End: 2019-01-01

## 2019-01-01 RX ORDER — LEVOFLOXACIN 750 MG/1
750 TABLET ORAL DAILY
Qty: 10 TABLET | Refills: 0 | Status: SHIPPED | OUTPATIENT
Start: 2019-01-01 | End: 2019-01-01

## 2019-01-01 RX ORDER — ESCITALOPRAM OXALATE 10 MG/1
10 TABLET ORAL DAILY
Status: DISCONTINUED | OUTPATIENT
Start: 2019-01-01 | End: 2019-01-01 | Stop reason: HOSPADM

## 2019-01-01 RX ORDER — ALPRAZOLAM 1 MG/1
1 TABLET ORAL
Status: COMPLETED | OUTPATIENT
Start: 2019-01-01 | End: 2019-01-01

## 2019-01-01 RX ORDER — FERROUS SULFATE 325(65) MG
325 TABLET ORAL DAILY
Status: DISCONTINUED | OUTPATIENT
Start: 2019-01-01 | End: 2019-01-01 | Stop reason: HOSPADM

## 2019-01-01 RX ORDER — OXYCODONE HYDROCHLORIDE 5 MG/1
5 TABLET ORAL EVERY 4 HOURS PRN
Status: ON HOLD | COMMUNITY
End: 2019-01-01

## 2019-01-01 RX ORDER — FERROUS SULFATE 325(65) MG
325 TABLET ORAL
Status: DISCONTINUED | OUTPATIENT
Start: 2019-01-01 | End: 2019-01-01 | Stop reason: HOSPADM

## 2019-01-01 RX ORDER — LIDOCAINE AND PRILOCAINE 25; 25 MG/G; MG/G
1 CREAM TOPICAL PRN
Status: ON HOLD | COMMUNITY
End: 2020-01-01 | Stop reason: HOSPADM

## 2019-01-01 RX ORDER — DOXYCYCLINE HYCLATE 100 MG
100 TABLET ORAL 2 TIMES DAILY
Qty: 14 TABLET | Refills: 0 | Status: ON HOLD | OUTPATIENT
Start: 2019-01-01 | End: 2019-01-01 | Stop reason: HOSPADM

## 2019-01-01 RX ORDER — POTASSIUM CHLORIDE 20 MEQ/1
20 TABLET, EXTENDED RELEASE ORAL DAILY
COMMUNITY
End: 2019-01-01 | Stop reason: ALTCHOICE

## 2019-01-01 RX ORDER — LORAZEPAM 0.5 MG/1
0.5 TABLET ORAL EVERY 8 HOURS PRN
Status: DISCONTINUED | OUTPATIENT
Start: 2019-01-01 | End: 2019-01-01 | Stop reason: HOSPADM

## 2019-01-01 RX ORDER — POTASSIUM CHLORIDE 20 MEQ/1
20 TABLET, EXTENDED RELEASE ORAL DAILY
Status: DISCONTINUED | OUTPATIENT
Start: 2019-01-01 | End: 2019-01-01 | Stop reason: HOSPADM

## 2019-01-01 RX ORDER — LORAZEPAM 0.5 MG/1
0.5 TABLET ORAL 2 TIMES DAILY PRN
Status: DISCONTINUED | OUTPATIENT
Start: 2019-01-01 | End: 2019-01-01 | Stop reason: HOSPADM

## 2019-01-01 RX ORDER — HEPARIN SODIUM (PORCINE) LOCK FLUSH IV SOLN 100 UNIT/ML 100 UNIT/ML
SOLUTION INTRAVENOUS
Status: COMPLETED
Start: 2019-01-01 | End: 2019-01-01

## 2019-01-01 RX ORDER — IBUPROFEN 400 MG/1
400 TABLET ORAL EVERY 6 HOURS PRN
Qty: 120 TABLET | Refills: 3 | COMMUNITY
Start: 2019-01-01 | End: 2020-01-01 | Stop reason: DRUGHIGH

## 2019-01-01 RX ORDER — LANOLIN ALCOHOL/MO/W.PET/CERES
1000 CREAM (GRAM) TOPICAL DAILY
Status: DISCONTINUED | OUTPATIENT
Start: 2019-01-01 | End: 2019-01-01 | Stop reason: HOSPADM

## 2019-01-01 RX ORDER — DOXYCYCLINE HYCLATE 100 MG/1
100 CAPSULE ORAL 2 TIMES DAILY
Status: DISCONTINUED | OUTPATIENT
Start: 2019-01-01 | End: 2019-01-01

## 2019-01-01 RX ORDER — AMOXICILLIN AND CLAVULANATE POTASSIUM 875; 125 MG/1; MG/1
1 TABLET, FILM COATED ORAL EVERY 12 HOURS SCHEDULED
Qty: 60 TABLET | Refills: 0 | Status: SHIPPED | OUTPATIENT
Start: 2019-01-01 | End: 2019-01-01 | Stop reason: SDUPTHER

## 2019-01-01 RX ORDER — FLUOROURACIL 50 MG/ML
600 INJECTION, SOLUTION INTRAVENOUS
Status: ON HOLD | COMMUNITY
End: 2019-01-01 | Stop reason: ALTCHOICE

## 2019-01-01 RX ORDER — OXALIPLATIN 5 MG/ML
130 INJECTION INTRAVENOUS
Status: ON HOLD | COMMUNITY
End: 2019-01-01 | Stop reason: ALTCHOICE

## 2019-01-01 RX ORDER — LOPERAMIDE HYDROCHLORIDE 2 MG/1
2 CAPSULE ORAL 4 TIMES DAILY PRN
Status: DISCONTINUED | OUTPATIENT
Start: 2019-01-01 | End: 2019-01-01 | Stop reason: HOSPADM

## 2019-01-01 RX ORDER — IRINOTECAN HYDROCHLORIDE 20 MG/ML
190 INJECTION INTRAVENOUS
Status: ON HOLD | COMMUNITY
End: 2019-01-01 | Stop reason: ALTCHOICE

## 2019-01-01 RX ORDER — SENNA AND DOCUSATE SODIUM 50; 8.6 MG/1; MG/1
1 TABLET, FILM COATED ORAL NIGHTLY PRN
Status: DISCONTINUED | OUTPATIENT
Start: 2019-01-01 | End: 2019-01-01 | Stop reason: HOSPADM

## 2019-01-01 RX ORDER — 0.9 % SODIUM CHLORIDE 0.9 %
30 INTRAVENOUS SOLUTION INTRAVENOUS ONCE
Status: COMPLETED | OUTPATIENT
Start: 2019-01-01 | End: 2019-01-01

## 2019-01-01 RX ORDER — DOXYCYCLINE HYCLATE 100 MG
500 TABLET ORAL DAILY
Status: ON HOLD | COMMUNITY
End: 2019-01-01 | Stop reason: HOSPADM

## 2019-01-01 RX ADMIN — PANTOPRAZOLE SODIUM 40 MG: 40 TABLET, DELAYED RELEASE ORAL at 08:45

## 2019-01-01 RX ADMIN — APIXABAN 5 MG: 5 TABLET, FILM COATED ORAL at 20:32

## 2019-01-01 RX ADMIN — LORAZEPAM 1 MG: 2 INJECTION INTRAMUSCULAR; INTRAVENOUS at 09:20

## 2019-01-01 RX ADMIN — PANCRELIPASE 6 CAPSULE: 60000; 12000; 38000 CAPSULE, DELAYED RELEASE PELLETS ORAL at 07:44

## 2019-01-01 RX ADMIN — IOPAMIDOL 100 ML: 755 INJECTION, SOLUTION INTRAVENOUS at 07:16

## 2019-01-01 RX ADMIN — IOPAMIDOL 80 ML: 755 INJECTION, SOLUTION INTRAVENOUS at 16:32

## 2019-01-01 RX ADMIN — LORAZEPAM 2 MG: 1 TABLET ORAL at 20:22

## 2019-01-01 RX ADMIN — PANCRELIPASE 6 CAPSULE: 60000; 12000; 38000 CAPSULE, DELAYED RELEASE PELLETS ORAL at 08:15

## 2019-01-01 RX ADMIN — SODIUM CHLORIDE, POTASSIUM CHLORIDE, SODIUM LACTATE AND CALCIUM CHLORIDE: 600; 310; 30; 20 INJECTION, SOLUTION INTRAVENOUS at 23:40

## 2019-01-01 RX ADMIN — ESCITALOPRAM OXALATE 10 MG: 10 TABLET ORAL at 08:46

## 2019-01-01 RX ADMIN — POTASSIUM CHLORIDE 20 MEQ: 20 TABLET, EXTENDED RELEASE ORAL at 20:45

## 2019-01-01 RX ADMIN — CEFEPIME 2 G: 2 INJECTION, POWDER, FOR SOLUTION INTRAVENOUS at 00:03

## 2019-01-01 RX ADMIN — PANCRELIPASE 6 CAPSULE: 60000; 12000; 38000 CAPSULE, DELAYED RELEASE PELLETS ORAL at 17:05

## 2019-01-01 RX ADMIN — POTASSIUM CHLORIDE AND SODIUM CHLORIDE: 900; 300 INJECTION, SOLUTION INTRAVENOUS at 08:45

## 2019-01-01 RX ADMIN — PANTOPRAZOLE SODIUM 40 MG: 40 TABLET, DELAYED RELEASE ORAL at 08:06

## 2019-01-01 RX ADMIN — IBUPROFEN 400 MG: 400 TABLET, FILM COATED ORAL at 20:22

## 2019-01-01 RX ADMIN — APIXABAN 5 MG: 5 TABLET, FILM COATED ORAL at 08:39

## 2019-01-01 RX ADMIN — ESCITALOPRAM OXALATE 5 MG: 10 TABLET ORAL at 09:10

## 2019-01-01 RX ADMIN — APIXABAN 5 MG: 5 TABLET, FILM COATED ORAL at 21:59

## 2019-01-01 RX ADMIN — FERROUS SULFATE TAB 325 MG (65 MG ELEMENTAL FE) 325 MG: 325 (65 FE) TAB at 08:40

## 2019-01-01 RX ADMIN — PANCRELIPASE 6 CAPSULE: 60000; 12000; 38000 CAPSULE, DELAYED RELEASE PELLETS ORAL at 13:26

## 2019-01-01 RX ADMIN — PANTOPRAZOLE SODIUM 40 MG: 40 TABLET, DELAYED RELEASE ORAL at 06:18

## 2019-01-01 RX ADMIN — ONDANSETRON 4 MG: 2 INJECTION INTRAMUSCULAR; INTRAVENOUS at 13:07

## 2019-01-01 RX ADMIN — VANCOMYCIN HYDROCHLORIDE 1000 MG: 1 INJECTION, POWDER, LYOPHILIZED, FOR SOLUTION INTRAVENOUS at 01:38

## 2019-01-01 RX ADMIN — PANCRELIPASE 6 CAPSULE: 60000; 12000; 38000 CAPSULE, DELAYED RELEASE PELLETS ORAL at 07:35

## 2019-01-01 RX ADMIN — POTASSIUM CHLORIDE 20 MEQ: 20 TABLET, EXTENDED RELEASE ORAL at 08:45

## 2019-01-01 RX ADMIN — ACETAMINOPHEN 1000 MG: 500 TABLET, COATED ORAL at 10:54

## 2019-01-01 RX ADMIN — SODIUM CHLORIDE, POTASSIUM CHLORIDE, SODIUM LACTATE AND CALCIUM CHLORIDE: 600; 310; 30; 20 INJECTION, SOLUTION INTRAVENOUS at 08:44

## 2019-01-01 RX ADMIN — APIXABAN 5 MG: 5 TABLET, FILM COATED ORAL at 09:11

## 2019-01-01 RX ADMIN — GABAPENTIN 300 MG: 300 CAPSULE ORAL at 07:44

## 2019-01-01 RX ADMIN — APIXABAN 5 MG: 5 TABLET, FILM COATED ORAL at 08:37

## 2019-01-01 RX ADMIN — SODIUM CHLORIDE, POTASSIUM CHLORIDE, SODIUM LACTATE AND CALCIUM CHLORIDE: 600; 310; 30; 20 INJECTION, SOLUTION INTRAVENOUS at 16:07

## 2019-01-01 RX ADMIN — ESCITALOPRAM OXALATE 5 MG: 10 TABLET ORAL at 08:40

## 2019-01-01 RX ADMIN — VANCOMYCIN HYDROCHLORIDE 2000 MG: 10 INJECTION, POWDER, LYOPHILIZED, FOR SOLUTION INTRAVENOUS at 20:34

## 2019-01-01 RX ADMIN — PIPERACILLIN SODIUM AND TAZOBACTAM SODIUM 3.38 G: 3; .375 INJECTION, POWDER, LYOPHILIZED, FOR SOLUTION INTRAVENOUS at 20:23

## 2019-01-01 RX ADMIN — LORAZEPAM 0.5 MG: 0.5 TABLET ORAL at 21:04

## 2019-01-01 RX ADMIN — PIPERACILLIN AND TAZOBACTAM 3.38 G: 3; .375 INJECTION, POWDER, FOR SOLUTION INTRAVENOUS at 15:09

## 2019-01-01 RX ADMIN — CEFEPIME 2 G: 2 INJECTION, POWDER, FOR SOLUTION INTRAMUSCULAR; INTRAVENOUS at 14:08

## 2019-01-01 RX ADMIN — PANTOPRAZOLE SODIUM 40 MG: 40 TABLET, DELAYED RELEASE ORAL at 07:44

## 2019-01-01 RX ADMIN — CEFEPIME HYDROCHLORIDE 2 G: 2 INJECTION, POWDER, FOR SOLUTION INTRAVENOUS at 13:41

## 2019-01-01 RX ADMIN — PANCRELIPASE 6 CAPSULE: 60000; 12000; 38000 CAPSULE, DELAYED RELEASE PELLETS ORAL at 16:42

## 2019-01-01 RX ADMIN — Medication 10 ML: at 20:32

## 2019-01-01 RX ADMIN — PANCRELIPASE 6 CAPSULE: 60000; 12000; 38000 CAPSULE, DELAYED RELEASE PELLETS ORAL at 16:52

## 2019-01-01 RX ADMIN — PANTOPRAZOLE SODIUM 40 MG: 40 TABLET, DELAYED RELEASE ORAL at 07:51

## 2019-01-01 RX ADMIN — APIXABAN 5 MG: 5 TABLET, FILM COATED ORAL at 20:55

## 2019-01-01 RX ADMIN — PIPERACILLIN AND TAZOBACTAM 3.38 G: 3; .375 INJECTION, POWDER, FOR SOLUTION INTRAVENOUS at 08:30

## 2019-01-01 RX ADMIN — PANTOPRAZOLE SODIUM 40 MG: 40 TABLET, DELAYED RELEASE ORAL at 08:37

## 2019-01-01 RX ADMIN — POTASSIUM CHLORIDE 20 MEQ: 20 TABLET, EXTENDED RELEASE ORAL at 08:37

## 2019-01-01 RX ADMIN — CEFEPIME HYDROCHLORIDE 2 G: 2 INJECTION, POWDER, FOR SOLUTION INTRAVENOUS at 10:20

## 2019-01-01 RX ADMIN — VANCOMYCIN HYDROCHLORIDE 1250 MG: 10 INJECTION, POWDER, LYOPHILIZED, FOR SOLUTION INTRAVENOUS at 20:42

## 2019-01-01 RX ADMIN — PIPERACILLIN AND TAZOBACTAM 3.38 G: 3; .375 INJECTION, POWDER, LYOPHILIZED, FOR SOLUTION INTRAVENOUS at 20:32

## 2019-01-01 RX ADMIN — APIXABAN 5 MG: 5 TABLET, FILM COATED ORAL at 08:41

## 2019-01-01 RX ADMIN — PANCRELIPASE 6 CAPSULE: 60000; 12000; 38000 CAPSULE, DELAYED RELEASE PELLETS ORAL at 17:34

## 2019-01-01 RX ADMIN — VANCOMYCIN HYDROCHLORIDE 2000 MG: 10 INJECTION, POWDER, LYOPHILIZED, FOR SOLUTION INTRAVENOUS at 10:31

## 2019-01-01 RX ADMIN — ACETAMINOPHEN 650 MG: 325 TABLET, FILM COATED ORAL at 08:47

## 2019-01-01 RX ADMIN — SODIUM CHLORIDE 1000 ML: 9 INJECTION, SOLUTION INTRAVENOUS at 20:19

## 2019-01-01 RX ADMIN — APIXABAN 5 MG: 5 TABLET, FILM COATED ORAL at 21:19

## 2019-01-01 RX ADMIN — PANCRELIPASE 6 CAPSULE: 60000; 12000; 38000 CAPSULE, DELAYED RELEASE PELLETS ORAL at 07:36

## 2019-01-01 RX ADMIN — DOXYCYCLINE HYCLATE 100 MG: 100 CAPSULE ORAL at 20:16

## 2019-01-01 RX ADMIN — LORAZEPAM 0.5 MG: 2 INJECTION, SOLUTION INTRAMUSCULAR; INTRAVENOUS at 22:07

## 2019-01-01 RX ADMIN — POTASSIUM CHLORIDE 20 MEQ: 20 TABLET, EXTENDED RELEASE ORAL at 20:36

## 2019-01-01 RX ADMIN — VANCOMYCIN HYDROCHLORIDE 1750 MG: 10 INJECTION, POWDER, LYOPHILIZED, FOR SOLUTION INTRAVENOUS at 12:30

## 2019-01-01 RX ADMIN — PANTOPRAZOLE SODIUM 40 MG: 40 TABLET, DELAYED RELEASE ORAL at 05:13

## 2019-01-01 RX ADMIN — ACETAMINOPHEN 1000 MG: 500 TABLET, FILM COATED ORAL at 20:12

## 2019-01-01 RX ADMIN — VANCOMYCIN HYDROCHLORIDE 1250 MG: 10 INJECTION, POWDER, LYOPHILIZED, FOR SOLUTION INTRAVENOUS at 21:05

## 2019-01-01 RX ADMIN — Medication 10 ML: at 08:39

## 2019-01-01 RX ADMIN — VANCOMYCIN HYDROCHLORIDE 1750 MG: 10 INJECTION, POWDER, LYOPHILIZED, FOR SOLUTION INTRAVENOUS at 23:52

## 2019-01-01 RX ADMIN — SODIUM CHLORIDE, POTASSIUM CHLORIDE, SODIUM LACTATE AND CALCIUM CHLORIDE: 600; 310; 30; 20 INJECTION, SOLUTION INTRAVENOUS at 23:14

## 2019-01-01 RX ADMIN — LORAZEPAM 0.5 MG: 2 INJECTION, SOLUTION INTRAMUSCULAR; INTRAVENOUS at 21:29

## 2019-01-01 RX ADMIN — PANCRELIPASE 6 CAPSULE: 60000; 12000; 38000 CAPSULE, DELAYED RELEASE PELLETS ORAL at 11:34

## 2019-01-01 RX ADMIN — ESCITALOPRAM OXALATE 5 MG: 10 TABLET ORAL at 09:55

## 2019-01-01 RX ADMIN — ACETAMINOPHEN 650 MG: 325 TABLET ORAL at 17:14

## 2019-01-01 RX ADMIN — POTASSIUM CHLORIDE AND SODIUM CHLORIDE: 900; 300 INJECTION, SOLUTION INTRAVENOUS at 20:51

## 2019-01-01 RX ADMIN — PANCRELIPASE 6 CAPSULE: 60000; 12000; 38000 CAPSULE, DELAYED RELEASE PELLETS ORAL at 13:41

## 2019-01-01 RX ADMIN — FERROUS SULFATE TAB 325 MG (65 MG ELEMENTAL FE) 325 MG: 325 (65 FE) TAB at 08:02

## 2019-01-01 RX ADMIN — POTASSIUM CHLORIDE 20 MEQ: 20 TABLET, EXTENDED RELEASE ORAL at 08:05

## 2019-01-01 RX ADMIN — PANCRELIPASE 6 CAPSULE: 60000; 12000; 38000 CAPSULE, DELAYED RELEASE PELLETS ORAL at 08:01

## 2019-01-01 RX ADMIN — PIPERACILLIN AND TAZOBACTAM 3.38 G: 3; .375 INJECTION, POWDER, FOR SOLUTION INTRAVENOUS at 20:33

## 2019-01-01 RX ADMIN — ESCITALOPRAM OXALATE 5 MG: 10 TABLET ORAL at 08:45

## 2019-01-01 RX ADMIN — VANCOMYCIN HYDROCHLORIDE 1250 MG: 10 INJECTION, POWDER, LYOPHILIZED, FOR SOLUTION INTRAVENOUS at 08:22

## 2019-01-01 RX ADMIN — ESCITALOPRAM OXALATE 5 MG: 10 TABLET ORAL at 08:05

## 2019-01-01 RX ADMIN — FERROUS SULFATE TAB 325 MG (65 MG ELEMENTAL FE) 325 MG: 325 (65 FE) TAB at 08:16

## 2019-01-01 RX ADMIN — FERROUS SULFATE TAB 325 MG (65 MG ELEMENTAL FE) 325 MG: 325 (65 FE) TAB at 11:03

## 2019-01-01 RX ADMIN — GADOBENATE DIMEGLUMINE 18 ML: 529 INJECTION, SOLUTION INTRAVENOUS at 11:53

## 2019-01-01 RX ADMIN — PANCRELIPASE 6 CAPSULE: 60000; 12000; 38000 CAPSULE, DELAYED RELEASE PELLETS ORAL at 12:08

## 2019-01-01 RX ADMIN — ESCITALOPRAM OXALATE 5 MG: 10 TABLET ORAL at 08:12

## 2019-01-01 RX ADMIN — PANTOPRAZOLE SODIUM 40 MG: 40 TABLET, DELAYED RELEASE ORAL at 08:46

## 2019-01-01 RX ADMIN — ACETAMINOPHEN 650 MG: 325 TABLET, FILM COATED ORAL at 02:01

## 2019-01-01 RX ADMIN — SODIUM CHLORIDE, POTASSIUM CHLORIDE, SODIUM LACTATE AND CALCIUM CHLORIDE: 600; 310; 30; 20 INJECTION, SOLUTION INTRAVENOUS at 22:40

## 2019-01-01 RX ADMIN — GABAPENTIN 300 MG: 300 CAPSULE ORAL at 08:46

## 2019-01-01 RX ADMIN — PIPERACILLIN AND TAZOBACTAM 3.38 G: 3; .375 INJECTION, POWDER, FOR SOLUTION INTRAVENOUS at 04:50

## 2019-01-01 RX ADMIN — SODIUM CHLORIDE 2448 ML: 9 INJECTION, SOLUTION INTRAVENOUS at 09:25

## 2019-01-01 RX ADMIN — APIXABAN 5 MG: 5 TABLET, FILM COATED ORAL at 08:15

## 2019-01-01 RX ADMIN — APIXABAN 5 MG: 5 TABLET, FILM COATED ORAL at 08:45

## 2019-01-01 RX ADMIN — METRONIDAZOLE 500 MG: 500 INJECTION, SOLUTION INTRAVENOUS at 06:09

## 2019-01-01 RX ADMIN — Medication 10 ML: at 20:55

## 2019-01-01 RX ADMIN — SODIUM CHLORIDE: 9 INJECTION, SOLUTION INTRAVENOUS at 00:36

## 2019-01-01 RX ADMIN — LEVOFLOXACIN 750 MG: 750 TABLET, FILM COATED ORAL at 08:12

## 2019-01-01 RX ADMIN — PANCRELIPASE 6 CAPSULE: 60000; 12000; 38000 CAPSULE, DELAYED RELEASE PELLETS ORAL at 11:45

## 2019-01-01 RX ADMIN — PANCRELIPASE 6 CAPSULE: 60000; 12000; 38000 CAPSULE, DELAYED RELEASE PELLETS ORAL at 07:12

## 2019-01-01 RX ADMIN — CEFEPIME HYDROCHLORIDE 2 G: 2 INJECTION, POWDER, FOR SOLUTION INTRAVENOUS at 08:06

## 2019-01-01 RX ADMIN — CEFEPIME 2 G: 2 INJECTION, POWDER, FOR SOLUTION INTRAVENOUS at 11:04

## 2019-01-01 RX ADMIN — ESCITALOPRAM OXALATE 5 MG: 10 TABLET ORAL at 08:02

## 2019-01-01 RX ADMIN — PANCRELIPASE 6 CAPSULE: 60000; 12000; 38000 CAPSULE, DELAYED RELEASE PELLETS ORAL at 16:57

## 2019-01-01 RX ADMIN — SODIUM CHLORIDE, POTASSIUM CHLORIDE, SODIUM LACTATE AND CALCIUM CHLORIDE: 600; 310; 30; 20 INJECTION, SOLUTION INTRAVENOUS at 08:22

## 2019-01-01 RX ADMIN — IBUPROFEN 400 MG: 400 TABLET ORAL at 22:07

## 2019-01-01 RX ADMIN — PIPERACILLIN AND TAZOBACTAM 3.38 G: 3; .375 INJECTION, POWDER, LYOPHILIZED, FOR SOLUTION INTRAVENOUS at 12:48

## 2019-01-01 RX ADMIN — LORAZEPAM 0.5 MG: 0.5 TABLET ORAL at 22:01

## 2019-01-01 RX ADMIN — APIXABAN 5 MG: 5 TABLET, FILM COATED ORAL at 20:36

## 2019-01-01 RX ADMIN — DOXYCYCLINE HYCLATE 100 MG: 100 CAPSULE ORAL at 21:44

## 2019-01-01 RX ADMIN — VANCOMYCIN HYDROCHLORIDE 1000 MG: 1 INJECTION, POWDER, LYOPHILIZED, FOR SOLUTION INTRAVENOUS at 09:10

## 2019-01-01 RX ADMIN — PANCRELIPASE 2 CAPSULE: 60000; 12000; 38000 CAPSULE, DELAYED RELEASE PELLETS ORAL at 17:21

## 2019-01-01 RX ADMIN — PIPERACILLIN AND TAZOBACTAM 3.38 G: 3; .375 INJECTION, POWDER, FOR SOLUTION INTRAVENOUS at 15:40

## 2019-01-01 RX ADMIN — LORAZEPAM 0.5 MG: 0.5 TABLET ORAL at 21:21

## 2019-01-01 RX ADMIN — LEVOFLOXACIN 500 MG: 500 TABLET, FILM COATED ORAL at 08:40

## 2019-01-01 RX ADMIN — GABAPENTIN 300 MG: 300 CAPSULE ORAL at 20:32

## 2019-01-01 RX ADMIN — CEFEPIME 2 G: 2 INJECTION, POWDER, FOR SOLUTION INTRAVENOUS at 11:34

## 2019-01-01 RX ADMIN — ACETAMINOPHEN 650 MG: 325 TABLET, FILM COATED ORAL at 19:42

## 2019-01-01 RX ADMIN — PANTOPRAZOLE SODIUM 40 MG: 40 TABLET, DELAYED RELEASE ORAL at 07:38

## 2019-01-01 RX ADMIN — APIXABAN 5 MG: 5 TABLET, FILM COATED ORAL at 08:02

## 2019-01-01 RX ADMIN — APIXABAN 5 MG: 5 TABLET, FILM COATED ORAL at 08:12

## 2019-01-01 RX ADMIN — PANCRELIPASE 6 CAPSULE: 60000; 12000; 38000 CAPSULE, DELAYED RELEASE PELLETS ORAL at 14:42

## 2019-01-01 RX ADMIN — PANTOPRAZOLE SODIUM 40 MG: 40 TABLET, DELAYED RELEASE ORAL at 05:27

## 2019-01-01 RX ADMIN — PANCRELIPASE 6 CAPSULE: 60000; 12000; 38000 CAPSULE, DELAYED RELEASE PELLETS ORAL at 11:03

## 2019-01-01 RX ADMIN — SODIUM CHLORIDE 1000 ML: 9 INJECTION, SOLUTION INTRAVENOUS at 19:00

## 2019-01-01 RX ADMIN — APIXABAN 5 MG: 5 TABLET, FILM COATED ORAL at 07:44

## 2019-01-01 RX ADMIN — ESCITALOPRAM OXALATE 5 MG: 10 TABLET ORAL at 08:15

## 2019-01-01 RX ADMIN — CEFEPIME HYDROCHLORIDE 2 G: 2 INJECTION, POWDER, FOR SOLUTION INTRAVENOUS at 08:45

## 2019-01-01 RX ADMIN — PIPERACILLIN AND TAZOBACTAM 4.5 G: 4; .5 INJECTION, POWDER, LYOPHILIZED, FOR SOLUTION INTRAVENOUS; PARENTERAL at 20:34

## 2019-01-01 RX ADMIN — APIXABAN 5 MG: 5 TABLET, FILM COATED ORAL at 08:46

## 2019-01-01 RX ADMIN — POTASSIUM CHLORIDE AND SODIUM CHLORIDE: 900; 300 INJECTION, SOLUTION INTRAVENOUS at 14:16

## 2019-01-01 RX ADMIN — IBUPROFEN 400 MG: 400 TABLET, FILM COATED ORAL at 18:10

## 2019-01-01 RX ADMIN — PANCRELIPASE 6 CAPSULE: 60000; 12000; 38000 CAPSULE, DELAYED RELEASE PELLETS ORAL at 17:29

## 2019-01-01 RX ADMIN — APIXABAN 5 MG: 5 TABLET, FILM COATED ORAL at 20:45

## 2019-01-01 RX ADMIN — VANCOMYCIN HYDROCHLORIDE 1750 MG: 10 INJECTION, POWDER, LYOPHILIZED, FOR SOLUTION INTRAVENOUS at 20:32

## 2019-01-01 RX ADMIN — POTASSIUM CHLORIDE 20 MEQ: 20 TABLET, EXTENDED RELEASE ORAL at 20:32

## 2019-01-01 RX ADMIN — LORAZEPAM 1 MG: 2 INJECTION INTRAMUSCULAR; INTRAVENOUS at 21:21

## 2019-01-01 RX ADMIN — PANCRELIPASE 6 CAPSULE: 60000; 12000; 38000 CAPSULE, DELAYED RELEASE PELLETS ORAL at 12:13

## 2019-01-01 RX ADMIN — VANCOMYCIN HYDROCHLORIDE 1750 MG: 10 INJECTION, POWDER, LYOPHILIZED, FOR SOLUTION INTRAVENOUS at 08:44

## 2019-01-01 RX ADMIN — PROMETHAZINE HYDROCHLORIDE 25 MG: 25 TABLET ORAL at 19:00

## 2019-01-01 RX ADMIN — ACETAMINOPHEN 650 MG: 325 TABLET, FILM COATED ORAL at 20:51

## 2019-01-01 RX ADMIN — VANCOMYCIN HYDROCHLORIDE 1750 MG: 10 INJECTION, POWDER, LYOPHILIZED, FOR SOLUTION INTRAVENOUS at 21:56

## 2019-01-01 RX ADMIN — PIPERACILLIN AND TAZOBACTAM 3.38 G: 3; .375 INJECTION, POWDER, FOR SOLUTION INTRAVENOUS at 14:42

## 2019-01-01 RX ADMIN — CEFEPIME HYDROCHLORIDE 2 G: 2 INJECTION, POWDER, FOR SOLUTION INTRAVENOUS at 09:28

## 2019-01-01 RX ADMIN — PIPERACILLIN AND TAZOBACTAM 3.38 G: 3; .375 INJECTION, POWDER, LYOPHILIZED, FOR SOLUTION INTRAVENOUS at 05:00

## 2019-01-01 RX ADMIN — APIXABAN 5 MG: 5 TABLET, FILM COATED ORAL at 20:16

## 2019-01-01 RX ADMIN — PANCRELIPASE 6 CAPSULE: 60000; 12000; 38000 CAPSULE, DELAYED RELEASE PELLETS ORAL at 08:37

## 2019-01-01 RX ADMIN — ALPRAZOLAM 1 MG: 1 TABLET ORAL at 12:11

## 2019-01-01 RX ADMIN — VANCOMYCIN HYDROCHLORIDE 1250 MG: 10 INJECTION, POWDER, LYOPHILIZED, FOR SOLUTION INTRAVENOUS at 08:40

## 2019-01-01 RX ADMIN — DOXYCYCLINE HYCLATE 100 MG: 100 CAPSULE ORAL at 09:11

## 2019-01-01 RX ADMIN — ACETAMINOPHEN 650 MG: 325 TABLET, FILM COATED ORAL at 21:44

## 2019-01-01 RX ADMIN — CEFEPIME 2 G: 2 INJECTION, POWDER, FOR SOLUTION INTRAVENOUS at 22:45

## 2019-01-01 RX ADMIN — DOXYCYCLINE HYCLATE 100 MG: 100 CAPSULE ORAL at 08:40

## 2019-01-01 RX ADMIN — CEFEPIME HYDROCHLORIDE 2 G: 2 INJECTION, POWDER, FOR SOLUTION INTRAVENOUS at 10:38

## 2019-01-01 RX ADMIN — VANCOMYCIN HYDROCHLORIDE 1750 MG: 10 INJECTION, POWDER, LYOPHILIZED, FOR SOLUTION INTRAVENOUS at 10:41

## 2019-01-01 RX ADMIN — PANCRELIPASE 6 CAPSULE: 60000; 12000; 38000 CAPSULE, DELAYED RELEASE PELLETS ORAL at 11:01

## 2019-01-01 RX ADMIN — CYANOCOBALAMIN TAB 1000 MCG 1000 MCG: 1000 TAB at 07:44

## 2019-01-01 RX ADMIN — Medication 10 ML: at 20:45

## 2019-01-01 RX ADMIN — VANCOMYCIN HYDROCHLORIDE 1500 MG: 10 INJECTION, POWDER, LYOPHILIZED, FOR SOLUTION INTRAVENOUS at 12:30

## 2019-01-01 RX ADMIN — PANCRELIPASE 6 CAPSULE: 60000; 12000; 38000 CAPSULE, DELAYED RELEASE PELLETS ORAL at 08:40

## 2019-01-01 RX ADMIN — ESCITALOPRAM OXALATE 10 MG: 10 TABLET ORAL at 07:44

## 2019-01-01 RX ADMIN — LORAZEPAM 0.5 MG: 0.5 TABLET ORAL at 20:50

## 2019-01-01 RX ADMIN — POTASSIUM CHLORIDE AND SODIUM CHLORIDE: 900; 300 INJECTION, SOLUTION INTRAVENOUS at 22:08

## 2019-01-01 RX ADMIN — CEFEPIME HYDROCHLORIDE 2 G: 2 INJECTION, POWDER, FOR SOLUTION INTRAVENOUS at 22:39

## 2019-01-01 RX ADMIN — Medication 10 ML: at 08:45

## 2019-01-01 RX ADMIN — PANCRELIPASE 6 CAPSULE: 60000; 12000; 38000 CAPSULE, DELAYED RELEASE PELLETS ORAL at 08:45

## 2019-01-01 RX ADMIN — LORAZEPAM 0.5 MG: 0.5 TABLET ORAL at 10:58

## 2019-01-01 RX ADMIN — METRONIDAZOLE 500 MG: 500 INJECTION, SOLUTION INTRAVENOUS at 20:45

## 2019-01-01 RX ADMIN — CYANOCOBALAMIN TAB 1000 MCG 1000 MCG: 1000 TAB at 08:46

## 2019-01-01 RX ADMIN — APIXABAN 5 MG: 5 TABLET, FILM COATED ORAL at 20:33

## 2019-01-01 RX ADMIN — PANTOPRAZOLE SODIUM 40 MG: 40 TABLET, DELAYED RELEASE ORAL at 07:06

## 2019-01-01 RX ADMIN — GADOBENATE DIMEGLUMINE 18 ML: 529 INJECTION, SOLUTION INTRAVENOUS at 12:55

## 2019-01-01 RX ADMIN — ONDANSETRON 8 MG: 2 INJECTION INTRAMUSCULAR; INTRAVENOUS at 09:33

## 2019-01-01 RX ADMIN — SODIUM CHLORIDE: 9 INJECTION, SOLUTION INTRAVENOUS at 21:56

## 2019-01-01 RX ADMIN — IOPAMIDOL 80 ML: 755 INJECTION, SOLUTION INTRAVENOUS at 15:37

## 2019-01-01 RX ADMIN — LEVOFLOXACIN 500 MG: 500 TABLET, FILM COATED ORAL at 09:11

## 2019-01-01 RX ADMIN — VANCOMYCIN HYDROCHLORIDE 1750 MG: 10 INJECTION, POWDER, LYOPHILIZED, FOR SOLUTION INTRAVENOUS at 00:09

## 2019-01-01 RX ADMIN — ACETAMINOPHEN 650 MG: 325 TABLET, FILM COATED ORAL at 16:50

## 2019-01-01 RX ADMIN — ESCITALOPRAM OXALATE 5 MG: 10 TABLET ORAL at 08:39

## 2019-01-01 RX ADMIN — CEFEPIME HYDROCHLORIDE 2 G: 2 INJECTION, POWDER, FOR SOLUTION INTRAVENOUS at 20:32

## 2019-01-01 RX ADMIN — SODIUM CHLORIDE: 9 INJECTION, SOLUTION INTRAVENOUS at 17:21

## 2019-01-01 RX ADMIN — APIXABAN 5 MG: 5 TABLET, FILM COATED ORAL at 20:50

## 2019-01-01 RX ADMIN — LORAZEPAM 0.5 MG: 0.5 TABLET ORAL at 18:10

## 2019-01-01 RX ADMIN — ACETAMINOPHEN 1000 MG: 500 TABLET, FILM COATED ORAL at 20:19

## 2019-01-01 RX ADMIN — APIXABAN 5 MG: 5 TABLET, FILM COATED ORAL at 08:40

## 2019-01-01 RX ADMIN — POTASSIUM CHLORIDE AND SODIUM CHLORIDE: 900; 300 INJECTION, SOLUTION INTRAVENOUS at 10:01

## 2019-01-01 RX ADMIN — LORAZEPAM 0.5 MG: 0.5 TABLET ORAL at 21:45

## 2019-01-01 RX ADMIN — ESCITALOPRAM OXALATE 5 MG: 10 TABLET ORAL at 08:37

## 2019-01-01 RX ADMIN — IBUPROFEN 400 MG: 400 TABLET ORAL at 05:52

## 2019-01-01 RX ADMIN — PANCRELIPASE 6 CAPSULE: 60000; 12000; 38000 CAPSULE, DELAYED RELEASE PELLETS ORAL at 17:32

## 2019-01-01 RX ADMIN — VANCOMYCIN HYDROCHLORIDE 1750 MG: 10 INJECTION, POWDER, LYOPHILIZED, FOR SOLUTION INTRAVENOUS at 10:32

## 2019-01-01 RX ADMIN — SODIUM CHLORIDE, POTASSIUM CHLORIDE, SODIUM LACTATE AND CALCIUM CHLORIDE: 600; 310; 30; 20 INJECTION, SOLUTION INTRAVENOUS at 19:11

## 2019-01-01 RX ADMIN — VANCOMYCIN HYDROCHLORIDE 1750 MG: 10 INJECTION, POWDER, LYOPHILIZED, FOR SOLUTION INTRAVENOUS at 22:40

## 2019-01-01 RX ADMIN — LORAZEPAM 1 MG: 2 INJECTION INTRAMUSCULAR; INTRAVENOUS at 15:16

## 2019-01-01 RX ADMIN — APIXABAN 5 MG: 5 TABLET, FILM COATED ORAL at 21:44

## 2019-01-01 RX ADMIN — VANCOMYCIN HYDROCHLORIDE 1000 MG: 1 INJECTION, POWDER, LYOPHILIZED, FOR SOLUTION INTRAVENOUS at 17:29

## 2019-01-01 RX ADMIN — CEFEPIME HYDROCHLORIDE 2 G: 2 INJECTION, POWDER, FOR SOLUTION INTRAVENOUS at 21:50

## 2019-01-01 RX ADMIN — SODIUM CHLORIDE: 9 INJECTION, SOLUTION INTRAVENOUS at 20:22

## 2019-01-01 RX ADMIN — KETOROLAC TROMETHAMINE 15 MG: 15 INJECTION, SOLUTION INTRAMUSCULAR; INTRAVENOUS at 18:58

## 2019-01-01 RX ADMIN — IOHEXOL 50 ML: 240 INJECTION, SOLUTION INTRATHECAL; INTRAVASCULAR; INTRAVENOUS; ORAL at 15:36

## 2019-01-01 RX ADMIN — ACETAMINOPHEN 1000 MG: 500 TABLET, FILM COATED ORAL at 09:33

## 2019-01-01 RX ADMIN — PANCRELIPASE 6 CAPSULE: 60000; 12000; 38000 CAPSULE, DELAYED RELEASE PELLETS ORAL at 16:17

## 2019-01-01 RX ADMIN — PANCRELIPASE 6 CAPSULE: 60000; 12000; 38000 CAPSULE, DELAYED RELEASE PELLETS ORAL at 08:05

## 2019-01-01 RX ADMIN — CEFEPIME HYDROCHLORIDE 2 G: 2 INJECTION, POWDER, FOR SOLUTION INTRAVENOUS at 20:45

## 2019-01-01 RX ADMIN — Medication 10 ML: at 08:06

## 2019-01-01 RX ADMIN — PANCRELIPASE 2 CAPSULE: 60000; 12000; 38000 CAPSULE, DELAYED RELEASE PELLETS ORAL at 17:07

## 2019-01-01 RX ADMIN — APIXABAN 5 MG: 5 TABLET, FILM COATED ORAL at 09:30

## 2019-01-01 RX ADMIN — HEPARIN 500 UNITS: 100 SYRINGE at 08:54

## 2019-01-01 RX ADMIN — ACETAMINOPHEN 650 MG: 325 TABLET, FILM COATED ORAL at 14:46

## 2019-01-01 RX ADMIN — ACETAMINOPHEN 650 MG: 325 TABLET, FILM COATED ORAL at 09:40

## 2019-01-01 RX ADMIN — POTASSIUM CHLORIDE 20 MEQ: 20 TABLET, EXTENDED RELEASE ORAL at 08:39

## 2019-01-01 RX ADMIN — Medication 10 ML: at 23:40

## 2019-01-01 RX ADMIN — METRONIDAZOLE 500 MG: 500 INJECTION, SOLUTION INTRAVENOUS at 12:13

## 2019-01-01 RX ADMIN — IOPAMIDOL 80 ML: 755 INJECTION, SOLUTION INTRAVENOUS at 12:59

## 2019-01-01 RX ADMIN — LORAZEPAM 0.5 MG: 0.5 TABLET ORAL at 21:37

## 2019-01-01 RX ADMIN — ACETAMINOPHEN 500 MG: 500 TABLET, FILM COATED ORAL at 13:08

## 2019-01-01 RX ADMIN — KETOROLAC TROMETHAMINE 15 MG: 15 INJECTION, SOLUTION INTRAMUSCULAR; INTRAVENOUS at 19:00

## 2019-01-01 RX ADMIN — PIPERACILLIN AND TAZOBACTAM 3.38 G: 3; .375 INJECTION, POWDER, FOR SOLUTION INTRAVENOUS at 05:34

## 2019-01-01 RX ADMIN — CEFEPIME HYDROCHLORIDE 2 G: 2 INJECTION, POWDER, FOR SOLUTION INTRAVENOUS at 23:11

## 2019-01-01 RX ADMIN — PANTOPRAZOLE SODIUM 40 MG: 40 TABLET, DELAYED RELEASE ORAL at 08:39

## 2019-01-01 RX ADMIN — PANTOPRAZOLE SODIUM 40 MG: 40 TABLET, DELAYED RELEASE ORAL at 06:43

## 2019-01-01 RX ADMIN — ESCITALOPRAM OXALATE 5 MG: 10 TABLET ORAL at 07:38

## 2019-01-01 RX ADMIN — PANCRELIPASE 4 CAPSULE: 60000; 12000; 38000 CAPSULE, DELAYED RELEASE PELLETS ORAL at 17:22

## 2019-01-01 RX ADMIN — PANCRELIPASE 6 CAPSULE: 60000; 12000; 38000 CAPSULE, DELAYED RELEASE PELLETS ORAL at 08:12

## 2019-01-01 RX ADMIN — APIXABAN 5 MG: 5 TABLET, FILM COATED ORAL at 21:04

## 2019-01-01 RX ADMIN — METRONIDAZOLE 500 MG: 500 INJECTION, SOLUTION INTRAVENOUS at 13:27

## 2019-01-01 RX ADMIN — APIXABAN 5 MG: 5 TABLET, FILM COATED ORAL at 08:05

## 2019-01-01 RX ADMIN — PIPERACILLIN AND TAZOBACTAM 3.38 G: 3; .375 INJECTION, POWDER, LYOPHILIZED, FOR SOLUTION INTRAVENOUS at 04:45

## 2019-01-01 RX ADMIN — PANCRELIPASE 6 CAPSULE: 60000; 12000; 38000 CAPSULE, DELAYED RELEASE PELLETS ORAL at 10:57

## 2019-01-01 RX ADMIN — FERROUS SULFATE TAB 325 MG (65 MG ELEMENTAL FE) 325 MG: 325 (65 FE) TAB at 11:34

## 2019-01-01 RX ADMIN — SODIUM CHLORIDE 1000 ML: 900 INJECTION, SOLUTION INTRAVENOUS at 10:54

## 2019-01-01 RX ADMIN — CEFEPIME 2 G: 2 INJECTION, POWDER, FOR SOLUTION INTRAVENOUS at 23:19

## 2019-01-01 RX ADMIN — LORAZEPAM 1 MG: 1 TABLET ORAL at 20:45

## 2019-01-01 RX ADMIN — PANCRELIPASE 6 CAPSULE: 60000; 12000; 38000 CAPSULE, DELAYED RELEASE PELLETS ORAL at 16:29

## 2019-01-01 RX ADMIN — PIPERACILLIN AND TAZOBACTAM 3.38 G: 3; .375 INJECTION, POWDER, FOR SOLUTION INTRAVENOUS at 00:00

## 2019-01-01 RX ADMIN — SODIUM CHLORIDE 1000 ML: 9 INJECTION, SOLUTION INTRAVENOUS at 18:19

## 2019-01-01 RX ADMIN — VANCOMYCIN HYDROCHLORIDE 1750 MG: 10 INJECTION, POWDER, LYOPHILIZED, FOR SOLUTION INTRAVENOUS at 12:16

## 2019-01-01 RX ADMIN — LORAZEPAM 0.5 MG: 0.5 TABLET ORAL at 11:30

## 2019-01-01 RX ADMIN — VANCOMYCIN HYDROCHLORIDE 1750 MG: 5 INJECTION, POWDER, LYOPHILIZED, FOR SOLUTION INTRAVENOUS at 00:05

## 2019-01-01 RX ADMIN — PANCRELIPASE 6 CAPSULE: 60000; 12000; 38000 CAPSULE, DELAYED RELEASE PELLETS ORAL at 11:14

## 2019-01-01 RX ADMIN — PANTOPRAZOLE SODIUM 40 MG: 40 TABLET, DELAYED RELEASE ORAL at 09:55

## 2019-01-01 RX ADMIN — DOXYCYCLINE HYCLATE 100 MG: 100 CAPSULE ORAL at 21:59

## 2019-01-01 RX ADMIN — OXYCODONE HYDROCHLORIDE 5 MG: 5 TABLET ORAL at 09:50

## 2019-01-01 RX ADMIN — ACETAMINOPHEN 650 MG: 325 TABLET, FILM COATED ORAL at 05:32

## 2019-01-01 RX ADMIN — LORAZEPAM 1 MG: 1 TABLET ORAL at 20:55

## 2019-01-01 RX ADMIN — PIPERACILLIN SODIUM,TAZOBACTAM SODIUM 3.38 G: 3; .375 INJECTION, POWDER, FOR SOLUTION INTRAVENOUS at 21:00

## 2019-01-01 RX ADMIN — IBUPROFEN 800 MG: 800 TABLET, FILM COATED ORAL at 11:23

## 2019-01-01 ASSESSMENT — ENCOUNTER SYMPTOMS
DIARRHEA: 0
CONSTIPATION: 0
SINUS PAIN: 0
SINUS PRESSURE: 0
CONSTIPATION: 0
PHOTOPHOBIA: 0
COUGH: 0
SHORTNESS OF BREATH: 0
CONSTIPATION: 0
NAUSEA: 1
PHOTOPHOBIA: 0
BACK PAIN: 0
VOMITING: 0
ABDOMINAL PAIN: 0
ABDOMINAL PAIN: 0
SHORTNESS OF BREATH: 0
WHEEZING: 0
COUGH: 0
ABDOMINAL PAIN: 0
VOMITING: 0
VOMITING: 0
ABDOMINAL PAIN: 0
RHINORRHEA: 0
DIARRHEA: 0
SORE THROAT: 0
SHORTNESS OF BREATH: 0
RHINORRHEA: 0
CONSTIPATION: 0
RHINORRHEA: 0
ABDOMINAL PAIN: 0
SINUS PRESSURE: 0
SORE THROAT: 0
COUGH: 0
PHOTOPHOBIA: 0
VOMITING: 1
SHORTNESS OF BREATH: 0
BACK PAIN: 0
PHOTOPHOBIA: 0
SINUS PAIN: 0
PHOTOPHOBIA: 0
WHEEZING: 0
NAUSEA: 0
ABDOMINAL PAIN: 0
SINUS PAIN: 0
SINUS PRESSURE: 0
WHEEZING: 0
SINUS PAIN: 0
NAUSEA: 1
COUGH: 1
SORE THROAT: 0
BACK PAIN: 0
RHINORRHEA: 0
NAUSEA: 0
WHEEZING: 0
WHEEZING: 0
SHORTNESS OF BREATH: 0
RHINORRHEA: 0
PHOTOPHOBIA: 0
SORE THROAT: 0
NAUSEA: 0
BACK PAIN: 0
VOMITING: 0
COUGH: 0
SHORTNESS OF BREATH: 0
SORE THROAT: 0
SORE THROAT: 0
COUGH: 0
VOMITING: 1
SINUS PRESSURE: 0
DIARRHEA: 0
CHEST TIGHTNESS: 0
DIARRHEA: 0
RHINORRHEA: 0
DIARRHEA: 0
NAUSEA: 0

## 2019-01-01 ASSESSMENT — PAIN SCALES - GENERAL
PAINLEVEL_OUTOF10: 3
PAINLEVEL_OUTOF10: 0
PAINLEVEL_OUTOF10: 0
PAINLEVEL_OUTOF10: 4
PAINLEVEL_OUTOF10: 1
PAINLEVEL_OUTOF10: 0
PAINLEVEL_OUTOF10: 3
PAINLEVEL_OUTOF10: 2
PAINLEVEL_OUTOF10: 0
PAINLEVEL_OUTOF10: 8
PAINLEVEL_OUTOF10: 4
PAINLEVEL_OUTOF10: 0
PAINLEVEL_OUTOF10: 2
PAINLEVEL_OUTOF10: 6
PAINLEVEL_OUTOF10: 0
PAINLEVEL_OUTOF10: 3
PAINLEVEL_OUTOF10: 0
PAINLEVEL_OUTOF10: 0
PAINLEVEL_OUTOF10: 6
PAINLEVEL_OUTOF10: 0
PAINLEVEL_OUTOF10: 7
PAINLEVEL_OUTOF10: 0
PAINLEVEL_OUTOF10: 3
PAINLEVEL_OUTOF10: 0
PAINLEVEL_OUTOF10: 0
PAINLEVEL_OUTOF10: 6
PAINLEVEL_OUTOF10: 0
PAINLEVEL_OUTOF10: 6
PAINLEVEL_OUTOF10: 6
PAINLEVEL_OUTOF10: 0
PAINLEVEL_OUTOF10: 4
PAINLEVEL_OUTOF10: 3
PAINLEVEL_OUTOF10: 0
PAINLEVEL_OUTOF10: 8
PAINLEVEL_OUTOF10: 0
PAINLEVEL_OUTOF10: 8
PAINLEVEL_OUTOF10: 3
PAINLEVEL_OUTOF10: 0
PAINLEVEL_OUTOF10: 3
PAINLEVEL_OUTOF10: 0
PAINLEVEL_OUTOF10: 0
PAINLEVEL_OUTOF10: 1
PAINLEVEL_OUTOF10: 3
PAINLEVEL_OUTOF10: 1
PAINLEVEL_OUTOF10: 0
PAINLEVEL_OUTOF10: 7
PAINLEVEL_OUTOF10: 0
PAINLEVEL_OUTOF10: 8

## 2019-01-01 ASSESSMENT — PAIN DESCRIPTION - PAIN TYPE
TYPE: ACUTE PAIN
TYPE: CHRONIC PAIN
TYPE: ACUTE PAIN

## 2019-01-01 ASSESSMENT — PAIN DESCRIPTION - LOCATION
LOCATION: HEAD
LOCATION: BACK;HEAD
LOCATION: HEAD

## 2019-01-01 ASSESSMENT — PAIN DESCRIPTION - PROGRESSION
CLINICAL_PROGRESSION: NOT CHANGED
CLINICAL_PROGRESSION: GRADUALLY IMPROVING
CLINICAL_PROGRESSION: GRADUALLY WORSENING
CLINICAL_PROGRESSION: GRADUALLY IMPROVING

## 2019-01-01 ASSESSMENT — PAIN DESCRIPTION - ONSET
ONSET: GRADUAL
ONSET: ON-GOING
ONSET: GRADUAL
ONSET: ON-GOING

## 2019-01-01 ASSESSMENT — PAIN DESCRIPTION - FREQUENCY
FREQUENCY: CONTINUOUS
FREQUENCY: INTERMITTENT
FREQUENCY: INTERMITTENT
FREQUENCY: CONTINUOUS
FREQUENCY: INTERMITTENT

## 2019-01-01 ASSESSMENT — PAIN DESCRIPTION - ORIENTATION
ORIENTATION: POSTERIOR
ORIENTATION: MID
ORIENTATION: MID
ORIENTATION: OTHER (COMMENT)

## 2019-01-01 ASSESSMENT — PAIN DESCRIPTION - DESCRIPTORS
DESCRIPTORS: ACHING;HEADACHE
DESCRIPTORS: ACHING;HEADACHE
DESCRIPTORS: ACHING
DESCRIPTORS: HEADACHE
DESCRIPTORS: ACHING

## 2019-01-01 ASSESSMENT — PAIN - FUNCTIONAL ASSESSMENT
PAIN_FUNCTIONAL_ASSESSMENT: ACTIVITIES ARE NOT PREVENTED

## 2019-05-01 PROBLEM — A41.9 SEPSIS (HCC): Status: ACTIVE | Noted: 2019-01-01

## 2019-05-01 PROBLEM — A41.9 SEPTICEMIA (HCC): Status: ACTIVE | Noted: 2019-01-01

## 2019-05-01 NOTE — PROGRESS NOTES
Pharmacy Consultation Note  (Antibiotic Dosing and Monitoring)    Initial consult date:   Consulting physician: Kristin Mccann  Drug(s): Vanco  Indication: Sepsis    Ht Readings from Last 1 Encounters:   19 5' 10\" (1.778 m)     Wt Readings from Last 1 Encounters:   19 180 lb (81.6 kg)       Pt is a 63 YOM    Age/  Gender IBW DW  Allergy Information   63 y.o.     male  81.6  Rivaroxaban                 Date  WBC BUN/CR UOP Drug/Dose Time   Given Level(s)   (Time) Comments     (#1) 4.5 5/0.8 -- Vancomycin 2000 mg IV x 1  1031       (#2)            (#3)            (#4)            (#5)            (#6)            (#7)            Estimated Creatinine Clearance: 105 mL/min (based on SCr of 0.8 mg/dL). UOP over the past 24 hours:       Intake/Output Summary (Last 24 hours) at 2019 1843  Last data filed at 2019 1613  Gross per 24 hour   Intake 550 ml   Output 100 ml   Net 450 ml       Temp max: Temp (24hrs), Av.4 °F (38.6 °C), Min:99.1 °F (37.3 °C), Max:103.1 °F (39.5 °C)      Antibiotic Regimen:  Antibiotic Dose Start Date End Date                       Cultures:  available culture and sensitivity results were reviewed in EPIC  Cultures sent and are pending. Culture Date Result    Blood cx #1     Blood cx #2     Resp panel     Urine       Imaging:      Type Date Result    CT abdomen pelvis  1. Essentially unchanged probable subtle postoperative changes in the  region of the pancreatic head. 2. Minimal free fluid in the deep pelvis. 3. Mild hepatomegaly with heterogeneous fatty infiltration of the  liver without focal lesion. 4. Moderate discogenic degenerative changes at L2-L3, L3-L4, L4-L5,  and L5-S1 levels. 5. Minimal levoscoliosis of the lumbar spine. XR Chest Portable  No acute cardiopulmonary disease. US Abdomen Limited  Possible metastatic lesion left lobe liver. Dedicated CT  of the abdomen with IV and oral contrast recommended.            Assessment:  · Consulted by  Justin to dose/monitor vancomycin  · Goal trough level:  15-20 mcg/mL  · Pt is a 62 y/o male who presented with fever and is on chemotherapy.  Empiric vancomycin being started for sepsis  · Serum creatinine today: 0.8; CrCl ~ >60; baseline Scr ~ 0.8  · Received vancomycin 2000 mg IV x 1 at 1031  · Patient previously had trough of 10.5 mcg/ml prior to 4th dose of vanco 1500 mg IV q12h 11/2018 (SCr of 0.8 at that time)    Plan:  · Will start vancomycin 1750 mg IV q12h  · Level at steady state  · Follow renal function  · Pharmacist will follow and monitor/adjust dosing as necessary      Thank you for the consult,    Joseph DennisD, BCPS 5/1/2019 6:43 PM
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
## 2019-05-01 NOTE — ED PROVIDER NOTES
ED Attending  CC: No      HPI:  5/1/19,   Time: 9:23 AM         David Meza is a 63 y.o. male presenting to the ED for fever, shaking chills and weakness, beginning last evening  The complaint has been persistent, moderate in severity, and worsened by nothing. The patient is a 52-year-old male who is undergoing treatment for pancreatic cancer. He states that he was initially diagnosed at stage I and underwent a Whipple procedure. He does indicate that some of the lymph nodes were subsequently positive which pushed into stage II. He states that at this point he's been receiving chemotherapy and doing well. He states that he last had chemo 1 week ago followed by some fluids and on Monday received a dose of Neulasta. He comes in today reporting that he started feeling ill last evening. He woke this morning to severe shaking chills and dizziness. He states that he feels weak and run down. Denies any cough. He has a port in his right upper chest wall. No other open skin lesions or rashes. Denies urinary burning or frequency. No abdominal pain reported. Denies vomiting or diarrhea.    He had similar admission here in November and was treated by Dr. Kalyn Rose and Dr. Roshni Gramjao      ROS:     Constitutional:  See HPI  HENT: Negative for ear pain, sore throat and sinus pressure  Eyes: Negative for pain, discharge and redness  Respiratory: See HPI  Cardiovascular: Negative for CP, edema or palpitations  Gastrointestinal: See HPI  Genitourinary: Negative for dysuria and frequency  Musculoskeletal: Negative for back pain and arthralgia  Skin: Negative for rash and wound  Neurological: Negative for weakness and headaches  Hematological: Negative for adenopathy    All other systems reviewed and are negative      -------------------------------- PAST HISTORY ----------------------------------  Past Medical History:  has a past medical history of Anticoagulant long-term use, Anxiety, Back pain, Cancer ([facility] [address] DDD (degenerative disc Lymphocytes # 0.32 (L) 1.50 - 4.00 E9/L    Monocytes # 0.18 0.10 - 0.95 E9/L    Eosinophils # 0.04 (L) 0.05 - 0.50 E9/L    Basophils # 0.00 0.00 - 0.20 E9/L    RBC Morphology Normal    Comprehensive Metabolic Panel   Result Value Ref Range    Sodium 136 132 - 146 mmol/L    Potassium 3.6 3.5 - 5.0 mmol/L    Chloride 101 98 - 107 mmol/L    CO2 22 22 - 29 mmol/L    Anion Gap 13 7 - 16 mmol/L    Glucose 100 (H) 74 - 99 mg/dL    BUN 5 (L) 6 - 20 mg/dL    CREATININE 0.8 0.7 - 1.2 mg/dL    GFR Non-African American >60 >=60 mL/min/1.73    GFR African American >60     Calcium 8.8 8.6 - 10.2 mg/dL    Total Protein 6.7 6.4 - 8.3 g/dL    Alb 3.6 3.5 - 5.2 g/dL    Total Bilirubin 0.7 0.0 - 1.2 mg/dL    Alkaline Phosphatase 676 (H) 40 - 129 U/L     (H) 0 - 40 U/L     (H) 0 - 39 U/L   Lactic Acid, Plasma   Result Value Ref Range    Lactic Acid 3.9 (H) 0.5 - 2.2 mmol/L   Urinalysis   Result Value Ref Range    Color, UA Yellow Straw/Yellow    Clarity, UA Clear Clear    Glucose, Ur Negative Negative mg/dL    Bilirubin Urine Negative Negative    Ketones, Urine Negative Negative mg/dL    Specific Gravity, UA 1.015 1.005 - 1.030    Blood, Urine Negative Negative    pH, UA 6.5 5.0 - 9.0    Protein, UA Negative Negative mg/dL    Urobilinogen, Urine 0.2 <2.0 E.U./dL    Nitrite, Urine Negative Negative    Leukocyte Esterase, Urine Negative Negative   Platelet Confirmation   Result Value Ref Range    Platelet Confirmation CONFIRMED    Lactic Acid, Plasma   Result Value Ref Range    Lactic Acid 1.5 0.5 - 2.2 mmol/L       RADIOLOGY:  Interpreted by Radiologist.  CT ABDOMEN PELVIS W IV CONTRAST Additional Contrast? None   Final Result      1. Essentially unchanged probable subtle postoperative changes in the   region of the pancreatic head. 2. Minimal free fluid in the deep pelvis. 3. Mild hepatomegaly with heterogeneous fatty infiltration of the   liver without focal lesion.    4. Moderate discogenic degenerative changes at L2-L3, L3-L4, L4-L5,   and L5-S1 levels. 5. Minimal levoscoliosis of the lumbar spine. US ABDOMEN LIMITED   Final Result   Possible metastatic lesion left lobe liver. Dedicated CT   of the abdomen with IV and oral contrast recommended. XR CHEST PORTABLE   Final Result   No acute cardiopulmonary disease.          ----------------- NURSING NOTES AND VITALS REVIEWED ---------------   The nursing notes within the ED encounter and vital signs as below have been reviewed. /69   Pulse 94   Temp 100.5 °F (38.1 °C) (Oral)   Resp 18   Ht 5' 10\" (1.778 m)   Wt 180 lb (81.6 kg)   SpO2 98%   BMI 25.83 kg/m²   Oxygen Saturation Interpretation: Normal      --------------------------------PHYSICAL EXAM------------------------------------      Constitutional/General: Alert and oriented x3. Ill appearing and weak   Having active chills  Head: NC/AT  Eyes: PERRL, EOMI  Mouth: Oropharynx clear, handling secretions, no trismus  Neck: Supple, full ROM, no meningeal signs  Pulmonary: Lungs clear to auscultation bilaterally, no wheezes, rales, or rhonchi. Not in respiratory distress  Cardiovascular:  Regular rate and rhythm, no murmurs, gallops, or rubs. 2+ distal pulses  Abdomen: Soft, + BS. Large vertical scar from LUQ to epigastric region. Intact and well healed. .  No palpable rigidity, rebound or guarding  Extremities: Moves all extremities x 4. Warm and well perfused  Skin: warm and dry without rash  Neurologic: GCS 15,  Intact.   No focal deficits  Psych: Normal Affect      ------------------------ ED COURSE/MEDICAL DECISION MAKING----------------------  Medications   0.9 % sodium chloride bolus (0 mL/kg × 81.6 kg Intravenous Stopped 5/1/19 1121)   cefepime (MAXIPIME) 2 g IVPB extended (mini-bag) (0 g Intravenous Stopped 5/1/19 1030)   vancomycin 2 g in dextrose 5% 500 ml IVPB (0 mg/kg × 81.6 kg Intravenous Stopped 5/1/19 1238)   acetaminophen (TYLENOL) tablet 1,000 mg (1,000 mg Oral Given

## 2019-05-01 NOTE — ED NOTES
Bed: 06  Expected date:   Expected time:   Means of arrival:   Comments:     Katt Lomas RN  05/01/19 5150

## 2019-05-02 NOTE — PROGRESS NOTES
5/2/2019 8:56 AM   [facility]     Subjective:     Kade Harmon is a 63 y.o. 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 Patient admitted with high Fevers without other symptom    Current meds:  apixaban (ELIQUIS) tablet 5 mg BID   diphenoxylate-atropine (LOMOTIL) 2.5-0.025 MG per tablet 1 tablet 4x Daily PRN   escitalopram (LEXAPRO) tablet 5 mg Daily   lidocaine-prilocaine (EMLA) cream 1 each Daily PRN   0.9 % sodium chloride infusion Continuous   oxyCODONE (ROXICODONE) immediate release tablet 5 mg Q4H PRN   lipase-protease-amylase (CREON) 82296 units delayed release capsule 6 capsule TID WC   lipase-protease-amylase (CREON) 12746 units delayed release capsule Daily PRN   pantoprazole (PROTONIX) tablet 40 mg Daily   promethazine (PHENERGAN) tablet 25 mg Q6H PRN   sennosides-docusate sodium (SENOKOT-S) 8.6-50 MG tablet 1 tablet Nightly PRN   cefepime (MAXIPIME) 2 g IVPB extended (mini-bag) Q12H   sodium chloride flush 0.9 % injection 10 mL 2 times per day   sodium chloride flush 0.9 % injection 10 mL PRN   magnesium hydroxide (MILK OF MAGNESIA) 400 MG/5ML suspension 30 mL Daily PRN   ondansetron (ZOFRAN) injection 4 mg Q6H PRN   potassium chloride (KLOR-CON M) extended release tablet 20 mEq Daily   vancomycin (VANCOCIN) 1,750 mg in dextrose 5 % 500 mL IVPB Q12H     Todays labs:  Recent Labs     05/01/19  0925 05/02/19  0602   WBC 4.5 7.0   HGB 10.6* 8.6*   PLT 98* 73*   BUN 5* 6   CREATININE 0.8 0.7                 ANC    4.0 K      Blood cultures neg to this point                            Objective:     Patient Vitals for the past 8 hrs:   BP Temp Temp src Pulse Resp SpO2   05/02/19 0815 126/74 100.4 °F (38 °C) Oral 60 18 97 %   Tmax 103  PE neg        Impression:     Principal Problem:    Sepsis ([address]  Active Problems:    Septicemia ([facility] [address]  Resolved Problems:    * No resolved hospital problems. *          Plan:    On

## 2019-05-02 NOTE — PROGRESS NOTES
Internal Medicine Resident Progress Note    Admission date: 5/1/2019  Primary care physician: Danilo Lucas DO  Chief complaint: Fever  Reason for Admission: Fever  Consultants:ID    Subjective  Julieta Myles was seen and examined at bedside today. All patient questions were answered and all tests were reviewed. No family present during my examination. Julieta Myles states that he feels better this morning denies any more fevers . Otherwise he states that there are no new complaints at this time including no chest pain, shortness of breath, abdominal pain, nausea, vomiting, diarrhea, constipation, headaches, fevers, chills, lightheadedness, dizziness, calf pain.     Hospital Medications    apixaban (ELIQUIS) tablet 5 mg BID   diphenoxylate-atropine (LOMOTIL) 2.5-0.025 MG per tablet 1 tablet 4x Daily PRN   escitalopram (LEXAPRO) tablet 5 mg Daily   lidocaine-prilocaine (EMLA) cream 1 each Daily PRN   0.9 % sodium chloride infusion Continuous   oxyCODONE (ROXICODONE) immediate release tablet 5 mg Q4H PRN   lipase-protease-amylase (CREON) 61624 units delayed release capsule 6 capsule TID WC   lipase-protease-amylase (CREON) 77428 units delayed release capsule Daily PRN   pantoprazole (PROTONIX) tablet 40 mg Daily   promethazine (PHENERGAN) tablet 25 mg Q6H PRN   sennosides-docusate sodium (SENOKOT-S) 8.6-50 MG tablet 1 tablet Nightly PRN   cefepime (MAXIPIME) 2 g IVPB extended (mini-bag) Q12H   sodium chloride flush 0.9 % injection 10 mL 2 times per day   sodium chloride flush 0.9 % injection 10 mL PRN   magnesium hydroxide (MILK OF MAGNESIA) 400 MG/5ML suspension 30 mL Daily PRN   ondansetron (ZOFRAN) injection 4 mg Q6H PRN   potassium chloride (KLOR-CON M) extended release tablet 20 mEq Daily   vancomycin (VANCOCIN) 1,750 mg in dextrose 5 % 500 mL IVPB Q12H       PRN Medications  diphenoxylate-atropine, lidocaine-prilocaine, oxyCODONE, lipase-protease-amylase, promethazine, sennosides-docusate sodium, sodium chloride flush, K 3.4 (L) 05/02/2019     (H) 05/02/2019    CREATININE 0.7 05/02/2019    BUN 6 05/02/2019    CO2 25 05/02/2019    GLUCOSE 84 05/02/2019     (H) 05/02/2019    AST 79 (H) 05/02/2019    INR 4.4 02/08/2019    TSH 2.570 05/02/2019    LABA1C 5.0 11/14/2018     *All labs in electric medical record were reviewed. Please see electronic chart for a more comprehensive set of labs    Ct Abdomen Pelvis W Iv Contrast Additional Contrast? None    Result Date: 5/1/2019  CT ABDOMEN AND PELVIS WITH IV CONTRAST Clinical Indication: Fever, sepsis, abnormal liver function test, with history of pancreatic cancer Contrast: 80 mL of Isovue-370 IV contrast. Comparison was made with previous study from February 8, 2019. TECHNIQUE; Serial axial images through the abdomen and pelvis were obtained following IV contrast and coronal and sagittal reformatted images were also obtained. CT technique includes automated exposure control. FINDINGS; Minimal hypoventilatory changes in the dependent portion of the lungs. No hiatal hernia. The liver enhances homogenously without focal lesion and is heterogeneously mildly fatty infiltrated and mildly enlarged. The gallbladder is absent and there is small pneumobilia, less prominent than before. No abnormal intra or extrahepatic biliary ductal dilatation. The spleen enhances homogenously and is grossly unremarkable. The pancreatic head and body are absent with redemonstration of faint ill-defined density right of the superior mesenteric artery likely representing postoperative changes, essentially unchanged. The adrenal glands are unremarkable bilaterally. The right kidney enhances homogeneously and shows no evidence of hydronephrosis or perinephric fluid. The left kidney enhances homogeneously and shows no evidence of hydronephrosis or perinephric fluid. No definite nephrolithiasis on either side. No signs of acute appendicitis.  The visualized opacified bowel loops are grossly unremarkable and the mesentery is clear. No free air or fluid in the abdomen. No free air  in the pelvis. Minimal amount of free fluid in the deep pelvis. No enlarged retroperitoneal or pelvic lymphadenopathy. Pelvic contents are grossly unremarkable. The abdominal aorta is normal in caliber without dissection. The urinary bladder is unremarkable. The prostate gland is grossly unremarkable. No inguinal hernia on either side. Moderate intervertebral disc space narrowing at L2-L3, L3-L4, L4-L5, and L5-S1 levels. Minimal levoscoliotic changes of the lumbar spine. 1. Essentially unchanged probable subtle postoperative changes in the region of the pancreatic head. 2. Minimal free fluid in the deep pelvis. 3. Mild hepatomegaly with heterogeneous fatty infiltration of the liver without focal lesion. 4. Moderate discogenic degenerative changes at L2-L3, L3-L4, L4-L5, and L5-S1 levels. 5. Minimal levoscoliosis of the lumbar spine. Xr Chest Portable    Result Date: 5/1/2019  Location:200 Exam: XR CHEST PORTABLE Comparison: 11/13/2018 History: Chills, fever Findings: Portable AP upright chest. Heart size is normal. Pulmonary vessels are nondistended. No focal pulmonary parenchymal consolidation. No acute cardiopulmonary disease. Us Abdomen Limited    Result Date: 5/1/2019  Location:200 Exam: US ABDOMEN LIMITED Comparison: June 9, 2018 History:   Elevated liver function test fever pain, history of Whipple procedure Findings: Realtime scanning of the right upper quadrant demonstrates the common bile duct measuring 4.5 mm. No intrahepatic bile duct dilation. Liver is echogenic compatible fatty infiltration. Pneumobilia is present. There is a questionable nodule in the left lobe liver which may be metastatic, measuring approximately 1.2 cm. Loops of bowel are identified within the gallbladder fossa. There is no ascites. Possible metastatic lesion left lobe liver.  Dedicated CT of the abdomen with IV and oral contrast recommended. Microbiology   Blood culture #1-GROWTH NOT PRESENT-INCUBATION CONTINUES. Blood culture #2-GROWTH NOT PRESENT-INCUBATION CONTINUES. Urine culture- pending    Respiratory culture- sent  Strep pneumo urine ag- pending  Legionella urine ag- pending  DFA panel- pending    Assessment  Fever while receiving chemo  Pancreatic adenocarcinoma s/p Whipple procedure undergoing chemotherapy  History of positive blood cultures for E. Faecium 9/25/18  Hx of SMA blood clot   OVIDIO not currently using CPAP  Anxiety  History of DVT and PE in [address] cultures are pending at this time. Will continue with empiric abx. ID consulted. Continue with IV fluid hydration. Pt with hx of SMA blood clot. Will check CTA abdomen  tomorrow. ·   · Otherwise continue current therapy. · Routine labs in am  · DVT prophylaxis  · Please see orders for further management and care. Patient was seen and examined with Dr. Alirio Mota. Gerardo Cruz DO, PGY3  5/2/2019  10:14 AM  NOTE: This report was transcribed using voice recognition software. Every effort was made to ensure accuracy; however, inadvertent computerized transcription errors may be present        I saw and evaluated the patient. I agree with the findings and the plan of care as documented in the resident's note.     Isidro Weller D.O., [facility]  11:17 AM  5/2/2019

## 2019-05-02 NOTE — PROGRESS NOTES
Pharmacy Consultation Note  (Antibiotic Dosing and Monitoring)    Initial consult date:   Consulting physician: Addie Carreno  Drug(s): Vanco  Indication: Sepsis    Ht Readings from Last 1 Encounters:   19 5' 10\" (1.778 m)     Wt Readings from Last 1 Encounters:   19 180 lb (81.6 kg)       Pt is a 63 YOM    Age/  Gender IBW DW  Allergy Information   63 y.o.     male  81.6  Rivaroxaban                 Date  WBC BUN/CR UOP Drug/Dose Time   Given Level(s)   (Time) Comments     (#1) 4.5 5/0.8 -- Vancomycin 2000 mg IV x 1   Vancomycin 1,750 mg IV Q12H 1031  2240       (#2) 7.0 6/0.7 -- Vancomycin 1,750 mg IV Q12H 1041  <2200>     5/3  (#3)     <1000> <0930> Hold dose if trough is >20 mcg/mL     (#4)            (#5)            (#6)            (#7)            Estimated Creatinine Clearance: 120 mL/min (based on SCr of 0.7 mg/dL). UOP over the past 24 hours:       Intake/Output Summary (Last 24 hours) at 2019 1228  Last data filed at 2019 1041  Gross per 24 hour   Intake 2880 ml   Output 100 ml   Net 2780 ml       Temp max: Temp (24hrs), Av °F (37.8 °C), Min:99.1 °F (37.3 °C), Max:100.5 °F (38.1 °C)      Antibiotic Regimen:  Antibiotic Dose Start Date End Date   Cefepime 2 g Q12H     Metronidazole 500 mg Q8H             Cultures:  available culture and sensitivity results were reviewed in EPIC  Cultures sent and are pending. Culture Date Result    Blood cx #1     Blood cx #2     Resp panel  Negative   Urine     Legionella/S pneumo  Negative     Imaging:      Type Date Result    CT abdomen pelvis  1. Essentially unchanged probable subtle postoperative changes in the  region of the pancreatic head. 2. Minimal free fluid in the deep pelvis. 3. Mild hepatomegaly with heterogeneous fatty infiltration of the  liver without focal lesion. 4. Moderate discogenic degenerative changes at L2-L3, L3-L4, L4-L5,  and L5-S1 levels. 5. Minimal levoscoliosis of the lumbar spine. XR Chest Portable 5/1 No acute cardiopulmonary disease. US Abdomen Limited 5/1 Possible metastatic lesion left lobe liver. Dedicated CT  of the abdomen with IV and oral contrast recommended. Assessment:  · Consulted by Dr. Alyson Adame to dose/monitor vancomycin  · Goal trough level:  15-20 mcg/mL  · Pt is a 62 y/o male who presented with fever and is on chemotherapy.  Empiric vancomycin being started for sepsis  · Serum creatinine today: 0.7; CrCl ~ >60; baseline Scr ~ 0.8  · Received vancomycin 2000 mg IV x 1 at 1031  · Patient previously had trough of 10.5 mcg/ml prior to 4th dose of vanco 1500 mg IV q12h 11/2018 (SCr of 0.8 at that time)    Plan:  · Continue vancomycin 1750 mg IV q12h  · Check trough tomorrow @ 0930, please draw trough 30 minutes prior to 1000 dose, hold dose only if trough is >20 mcg/mL  · Follow renal function  · Pharmacist will follow and monitor/adjust dosing as necessary      Thank you for the consult,    Demian Still, PharmD, BCPS 5/2/2019 12:30 PM   [phone]
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
## 2019-05-02 NOTE — PLAN OF CARE
Problem: Falls - Risk of:  Goal: Will remain free from falls  Description  Will remain free from falls  Outcome: Met This Shift  Goal: Absence of physical injury  Description  Absence of physical injury  Outcome: Met This Shift     Problem: Fluid Volume:  Goal: Maintenance of adequate hydration will improve  Description  Maintenance of adequate hydration will improve  Outcome: Met This Shift

## 2019-05-02 NOTE — PLAN OF CARE
Problem: Falls - Risk of:  Goal: Will remain free from falls  Description  Will remain free from falls  5/2/2019 1519 by Aline Cooley RN  Outcome: Met This Shift     Problem: Infection, Septic Shock:  Goal: Will show no infection signs and symptoms  Description  Will show no infection signs and symptoms  Outcome: Met This Shift     Problem: Fluid Volume:  Goal: Maintenance of adequate hydration will improve  Description  Maintenance of adequate hydration will improve  5/2/2019 1519 by Aline Cooley RN  Outcome: Met This Shift

## 2019-05-03 NOTE — PLAN OF CARE
Problem: Falls - Risk of:  Goal: Will remain free from falls  Description  Will remain free from falls  Outcome: Met This Shift  Goal: Absence of physical injury  Description  Absence of physical injury  Outcome: Met This Shift     Problem: Infection, Septic Shock:  Goal: Will show no infection signs and symptoms  Description  Will show no infection signs and symptoms  Outcome: Met This Shift     Problem: Fluid Volume:  Goal: Maintenance of adequate hydration will improve  Description  Maintenance of adequate hydration will improve  Outcome: Met This Shift

## 2019-05-03 NOTE — PROGRESS NOTES
Pharmacy Consultation Note  (Antibiotic Dosing and Monitoring)    Initial consult date:   Consulting physician: Girma Garcias  Drug(s): Vancomycin  Indication: Sepsis    Ht Readings from Last 1 Encounters:   19 5' 10\" (1.778 m)     Wt Readings from Last 1 Encounters:   19 180 lb (81.6 kg)       Pt is a 63 YOM    Age/  Gender IBW DW  Allergy Information   63 y.o.     male  81.6  Rivaroxaban                 Date  WBC BUN/CR UOP Drug/Dose Time   Given Level(s)   (Time) Comments     (#1) 4.5 5/0.8 -- Vancomycin 2000 mg IV x 1   Vancomycin 1,750 mg IV Q12H 1031  2240     /  (#2) 7.0 6/0.7 -- Vancomycin 1,750 mg IV Q12H 1041  2156     5/3  (#3) 8.3 4/0.7 -- Vancomycin 1,750 mg IV Q12H 1032 16.1 mcg/mL @ 0930 Hold dose if trough is >20 mcg/mL     (#4)            (#5)            (#6)            (#7)            Estimated Creatinine Clearance: 120 mL/min (based on SCr of 0.7 mg/dL). UOP over the past 24 hours:       Intake/Output Summary (Last 24 hours) at 5/3/2019 1136  Last data filed at 5/3/2019 1032  Gross per 24 hour   Intake 1973 ml   Output 0 ml   Net 1973 ml       Temp max: Temp (24hrs), Av.2 °F (37.3 °C), Min:98.9 °F (37.2 °C), Max:99.6 °F (37.6 °C)      Antibiotic Regimen:  Antibiotic Dose Start Date End Date   Cefepime 2 g Q12H     Metronidazole 500 mg Q8H             Cultures:  available culture and sensitivity results were reviewed in EPIC  Cultures sent and are pending. Culture Date Result    Blood cx #1  NGTD   Blood cx #2  NGTD   Resp panel  Negative   Urine  No growth   Legionella/S pneumo  Negative     Imaging:      Type Date Result    CT abdomen pelvis  1. Essentially unchanged probable subtle postoperative changes in the  region of the pancreatic head. 2. Minimal free fluid in the deep pelvis. 3. Mild hepatomegaly with heterogeneous fatty infiltration of the  liver without focal lesion.   4. Moderate discogenic degenerative changes at L2-L3, L3-L4, L4-L5,  and L5-S1 levels. 5. Minimal levoscoliosis of the lumbar spine. XR Chest Portable 5/1 No acute cardiopulmonary disease. US Abdomen Limited 5/1 Possible metastatic lesion left lobe liver. Dedicated CT  of the abdomen with IV and oral contrast recommended. Assessment:  · Consulted by Dr. Ángel Calle to dose/monitor vancomycin  · Goal trough level:  15-20 mcg/mL  · Pt is a 64 y/o male who presented with fever and is on chemotherapy.  Empiric vancomycin being started for sepsis  · Serum creatinine today: 0.7; CrCl ~ >60; baseline Scr ~ 0.8  · Received vancomycin 2000 mg IV x 1 at 1031  · Patient previously had trough of 10.5 mcg/ml prior to 4th dose of vanco 1500 mg IV q12h 11/2018 (SCr of 0.8 at that time)  · 5/3: trough therapeutic this morning @ 0930 = 16.1 mcg/mL    Plan:  · Continue vancomycin 1750 mg IV q12h  · Follow renal function  · Pharmacist will follow and monitor/adjust dosing as necessary      Thank you for the consult,    Jeannie YoungD, BCPS 5/3/2019 11:36 AM   [phone]

## 2019-05-03 NOTE — PROGRESS NOTES
Internal Medicine Resident Progress Note    Admission date: 5/1/2019  Primary care physician: Amish Oliveros DO  Chief complaint: Fever  Reason for Admission: Fever  Consultants:ID    Subjective  Basia Pruett was seen and examined at bedside today. All patient questions were answered and all tests were reviewed. No family present during my examination. Basia Pruett states that he feels better this morning denies any more fevers . Otherwise he states that there are no new complaints at this time including no chest pain, shortness of breath, abdominal pain, nausea, vomiting, diarrhea, constipation, headaches, fevers, chills, lightheadedness, dizziness, calf pain.     Hospital Medications    metronidazole (FLAGYL) 500 mg in NaCl 100 mL IVPB premix Q8H   LORazepam (ATIVAN) tablet 1 mg Nightly PRN   acetaminophen (TYLENOL) tablet 650 mg Q4H PRN   apixaban (ELIQUIS) tablet 5 mg BID   diphenoxylate-atropine (LOMOTIL) 2.5-0.025 MG per tablet 1 tablet 4x Daily PRN   escitalopram (LEXAPRO) tablet 5 mg Daily   lidocaine-prilocaine (EMLA) cream 1 each Daily PRN   0.9 % sodium chloride infusion Continuous   oxyCODONE (ROXICODONE) immediate release tablet 5 mg Q4H PRN   lipase-protease-amylase (CREON) 46820 units delayed release capsule 6 capsule TID WC   lipase-protease-amylase (CREON) 22321 units delayed release capsule Daily PRN   pantoprazole (PROTONIX) tablet 40 mg Daily   promethazine (PHENERGAN) tablet 25 mg Q6H PRN   sennosides-docusate sodium (SENOKOT-S) 8.6-50 MG tablet 1 tablet Nightly PRN   cefepime (MAXIPIME) 2 g IVPB extended (mini-bag) Q12H   sodium chloride flush 0.9 % injection 10 mL 2 times per day   sodium chloride flush 0.9 % injection 10 mL PRN   magnesium hydroxide (MILK OF MAGNESIA) 400 MG/5ML suspension 30 mL Daily PRN   ondansetron (ZOFRAN) injection 4 mg Q6H PRN   potassium chloride (KLOR-CON M) extended release tablet 20 mEq Daily   vancomycin (VANCOCIN) 1,750 mg in dextrose 5 % 500 mL IVPB Q12H       PRN Medications  LORazepam, acetaminophen, diphenoxylate-atropine, lidocaine-prilocaine, oxyCODONE, lipase-protease-amylase, promethazine, sennosides-docusate sodium, sodium chloride flush, magnesium hydroxide, ondansetron      Review of Systems  Please see HPI above. All bolded are positive. All un-bolded are negative. Gen: fever, chills, fatigue, weakness, diaphoresis, increase in thirst, unintentional weight change, loss of appetite  Head: headache, vision change, hearing loss  Chest: chest pain, chest heaviness, palpitations  Lungs: shortness of breath, wheezing, coughing  Abdomen: abdominal pain, nausea, vomiting, diarrhea, constipation, melena, hematochezia, hematemesis  Extremities: lower extremity edema, myalgias, arthralgias  Urinary: dysuria, hematuria, or increase in frequency  Neurologic: lightheadedness, dizziness, confusion, syncope  Psychiatric: depression, suicidal ideation, or anxiety      Objective  Most Recent Recorded Vitals  Vitals:    05/03/19 0744   BP: 132/82   Pulse: 64   Resp: 18   Temp: 98.9 °F (37.2 °C)   SpO2: 99%     I/O last 3 completed shifts: In: 2017 [P.O.:600; I.V.:867; IV Piggyback:550]  Out: 0   I/O this shift: In: 48 [IV Piggyback:50]  Out: -     Physical Exam:  General: AAO to person, place, time, and purpose, NAD, no labored breathing  Eyes: Conjunctivae/corneas clear, Sclera non icteric. Skin: Color, texture, and turgor normal. No rashes or lesions. Lungs: Clear to auscultation bilaterally. No retractions or use of accessory muscles. No vocal fremitus. No rhonchi, No crackle No rale. Heart: Regular rate and regular rhythm, no murmur  Abdomen: Soft, non-tender; bowel sounds normal; no masses,  no organomegaly  Extremities: Atraumatic, no cyanosis, no edema  Neurologic: Cranial nerves 2-12 grossly intact, no slurred speech. Osteopathic/Structural Exam: Examined in seated and supine position. Normal thoracic kyphosis. Normal lumbar lordosis. No acute changes.     Most Recent Labs  Lab Results   Component Value Date    WBC 8.3 05/03/2019    HGB 8.6 (L) 05/03/2019    HCT 28.2 (L) 05/03/2019    PLT 74 (L) 05/03/2019     05/03/2019    K 3.4 (L) 05/03/2019     (H) 05/03/2019    CREATININE 0.7 05/03/2019    BUN 4 (L) 05/03/2019    CO2 25 05/03/2019    GLUCOSE 88 05/03/2019    ALT 89 (H) 05/03/2019    AST 35 05/03/2019    INR 4.4 02/08/2019    TSH 2.570 05/02/2019    LABA1C 4.9 05/02/2019     *All labs in electric medical record were reviewed. Please see electronic chart for a more comprehensive set of labs    Ct Abdomen Pelvis W Iv Contrast Additional Contrast? None    Result Date: 5/1/2019  CT ABDOMEN AND PELVIS WITH IV CONTRAST Clinical Indication: Fever, sepsis, abnormal liver function test, with history of pancreatic cancer Contrast: 80 mL of Isovue-370 IV contrast. Comparison was made with previous study from February 8, 2019. TECHNIQUE; Serial axial images through the abdomen and pelvis were obtained following IV contrast and coronal and sagittal reformatted images were also obtained. CT technique includes automated exposure control. FINDINGS; Minimal hypoventilatory changes in the dependent portion of the lungs. No hiatal hernia. The liver enhances homogenously without focal lesion and is heterogeneously mildly fatty infiltrated and mildly enlarged. The gallbladder is absent and there is small pneumobilia, less prominent than before. No abnormal intra or extrahepatic biliary ductal dilatation. The spleen enhances homogenously and is grossly unremarkable. The pancreatic head and body are absent with redemonstration of faint ill-defined density right of the superior mesenteric artery likely representing postoperative changes, essentially unchanged. The adrenal glands are unremarkable bilaterally. The right kidney enhances homogeneously and shows no evidence of hydronephrosis or perinephric fluid.  The left kidney enhances homogeneously and shows no evidence of hydronephrosis or perinephric fluid. No definite nephrolithiasis on either side. No signs of acute appendicitis. The visualized opacified bowel loops are grossly unremarkable and the mesentery is clear. No free air or fluid in the abdomen. No free air  in the pelvis. Minimal amount of free fluid in the deep pelvis. No enlarged retroperitoneal or pelvic lymphadenopathy. Pelvic contents are grossly unremarkable. The abdominal aorta is normal in caliber without dissection. The urinary bladder is unremarkable. The prostate gland is grossly unremarkable. No inguinal hernia on either side. Moderate intervertebral disc space narrowing at L2-L3, L3-L4, L4-L5, and L5-S1 levels. Minimal levoscoliotic changes of the lumbar spine. 1. Essentially unchanged probable subtle postoperative changes in the region of the pancreatic head. 2. Minimal free fluid in the deep pelvis. 3. Mild hepatomegaly with heterogeneous fatty infiltration of the liver without focal lesion. 4. Moderate discogenic degenerative changes at L2-L3, L3-L4, L4-L5, and L5-S1 levels. 5. Minimal levoscoliosis of the lumbar spine. Xr Chest Portable    Result Date: 5/1/2019  Location:200 Exam: XR CHEST PORTABLE Comparison: 11/13/2018 History: Chills, fever Findings: Portable AP upright chest. Heart size is normal. Pulmonary vessels are nondistended. No focal pulmonary parenchymal consolidation. No acute cardiopulmonary disease. Us Abdomen Limited    Result Date: 5/1/2019  Location:200 Exam: US ABDOMEN LIMITED Comparison: June 9, 2018 History:   Elevated liver function test fever pain, history of Whipple procedure Findings: Realtime scanning of the right upper quadrant demonstrates the common bile duct measuring 4.5 mm. No intrahepatic bile duct dilation. Liver is echogenic compatible fatty infiltration. Pneumobilia is present.  There is a questionable nodule in the left lobe liver which may be metastatic, measuring approximately 1.2

## 2019-05-03 NOTE — PROGRESS NOTES
[facility]  PROGRESS NOTE  Admit Date:  2019   NAME:  Trung Cooney  MRN:  [id number]  :  1960  Age:   63 y.o. PCP:   Tab Painter DO, Shanna Pruvis DO  Hospital Day: Hospital Day: 3    Subjective:   Pt was seen and examined in a face to face encounter for fevers with CC of fevers  Chief Complaint   Patient presents with    Fever     symptoms starting this morning, states temp 102.3     Dizziness     HPI: pt feels better  No fevers on atbx  mediport  no erythema  No chills    ROS:  CONSTITUTIONAL:   No fever, chills, weight loss, loss of appetite  ALLERGIES:    No urticaria, hay fever,    EYES:     No blurry vision, loss of vision,eye pain  ENT:      No hearing loss, sore throat  CARDIOVASCULAR:  No chest pain, palpitations  RESPIRATORY:    No cough, sob  HEME-LYMPH:   No easy bruising, bleeding  GI:     No nausea, vomiting or diarrhea  :     No urinary complaints  NEURO:    No  lightheadedness, dizziness, confusion,   MUSCULOSKELETAL:  No muscle aches, pain   SKIN:     No rash or itch  PSYCH:    No depression or anxiety      Scheduled Meds:   metroNIDAZOLE  500 mg Intravenous Q8H    apixaban  5 mg Oral BID    escitalopram  5 mg Oral Daily    lipase-protease-amylase  6 capsule Oral TID WC    pantoprazole  40 mg Oral Daily    cefepime  2 g Intravenous Q12H    sodium chloride flush  10 mL Intravenous 2 times per day    potassium chloride  20 mEq Oral Daily    vancomycin  1,750 mg Intravenous Q12H     Continuous Infusions:   sodium chloride 100 mL/hr at 19 2156     PRN Meds:LORazepam, acetaminophen, diphenoxylate-atropine, lidocaine-prilocaine, oxyCODONE, lipase-protease-amylase, promethazine, sennosides-docusate sodium, sodium chloride flush, magnesium hydroxide, ondansetron    ALLERGIES: Rivaroxaban    Objective:   Vitals reviewed.   Vitals:    19 1607 19 2030 19 0115 19 0744   BP: 126/76 112/64 130/68 132/82 Pulse: 70 72 64 64   Resp: 16 16 16 18   Temp: 99.6 °F (37.6 °C) 99.3 °F (37.4 °C) 99 °F (37.2 °C) 98.9 °F (37.2 °C)   TempSrc: Oral   Oral   SpO2: 98% 95% 97% 99%   Weight:       Height:         Physical Exam  Constitutional: The patient is awake, alert, and oriented. Skin: Warm and dry. No rashes were noted. tattoos  HEENT: Eyes show round, and reactive pupils. No jaundice. Neck: Supple to movements. No lymphadenopathy. Chest: No use of accessory muscles to breathe. Symmetrical expansion. Auscultation reveals no wheezing, crackles, or rhonchi. Cardiovascular: S1 and S2 are rhythmic and regular. No murmurs appreciated. Abdomen: Positive bowel sounds to auscultation. Benign to palpation. Extremities: No clubbing, no cyanosis, no edema. CNS: awake and alert  Psych:  pleasant  Lines: peripheral  rigth mediport    I & O - 24hr:    Intake/Output Summary (Last 24 hours) at 5/3/2019 0912  Last data filed at 5/3/2019 0806  Gross per 24 hour   Intake 1777 ml   Output 0 ml   Net 1777 ml     I/O this shift:   In: 48 [IV Piggyback:50]  Out: -    Weight change:   LABS:    Recent Labs     05/01/19 0925 05/02/19  0602 05/03/19  0600   WBC 4.5 7.0 8.3   HGB 10.6* 8.6* 8.6*   HCT 33.2* 27.9* 28.2*   MCV 98.8 100.4* 100.7*   PLT 98* 73* 74*     Recent Labs     05/01/19 0925 05/02/19  0602 05/03/19  0600    141 143   K 3.6 3.4* 3.4*    108* 109*   CO2 22 25 25   BUN 5* 6 4*   CREATININE 0.8 0.7 0.7   GFRAA >60 >60 >60   LABGLOM >60 >60 >60   GLUCOSE 100* 84 88   PROT 6.7 5.4* 5.7*   LABALBU 3.6 2.9* 3.1*   CALCIUM 8.8 8.1* 8.1*   BILITOT 0.7 0.3 0.3   ALKPHOS 676* 432* 320*   * 79* 35   * 134* 89*        Lab Results   Component Value Date    SEDRATE 55 (H) 05/02/2019     Lab Results   Component Value Date    CRP 9.7 (H) 05/02/2019       MICROBIOLOGY:   BLOOD CX #1  Recent Labs     05/01/19  0945 05/01/19  1020   BC 24 Hours- no growth 24 Hours- no growth     BLOOD CX #2  Recent Labs 05/01/19  0940   BLOODCULT2 24 Hours- no growth       Organism   Date Value Ref Range Status   11/13/2018 Clostridium perfringens (A)  Final       Urine Culture, Routine   Date Value Ref Range Status   05/01/2019 Growth not present  Final   11/13/2018 <10,000 CFU/mL  Gram positive organism    Final     Organism   Date Value Ref Range Status   11/13/2018 Clostridium perfringens (A)  Final       Recent Labs     05/01/19  1700   STREPNEUMAGU Presumptive NEGATIVE for Pneumococcal pneumonia, suggesting  no current or recent pneumococcal infection. Infection due  to S. pneumoniae cannot be ruled out since the antigen present  in the sample may be below the detection limit of the test.         RADIOLOGY:  CTA ABDOMEN PELVIS W CONTRAST   Final Result   Normal CTA of the abdomen and pelvis. 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 The inferior mesenteric vein is enlarged, as likely this is the   collateral flow pathway for the mesenteric veins. CT ABDOMEN PELVIS W IV CONTRAST Additional Contrast? None   Final Result      1. Essentially unchanged probable subtle postoperative changes in the   region of the pancreatic head. 2. Minimal free fluid in the deep pelvis. 3. Mild hepatomegaly with heterogeneous fatty infiltration of the   liver without focal lesion. 4. Moderate discogenic degenerative changes at L2-L3, L3-L4, L4-L5,   and L5-S1 levels. 5. Minimal levoscoliosis of the lumbar spine. US ABDOMEN LIMITED   Final Result   Possible metastatic lesion left lobe liver. Dedicated CT   of the abdomen with IV and oral contrast recommended. XR CHEST PORTABLE   Final Result   No acute cardiopulmonary disease.         Lab Results   Component Value Date  05/03/2019    K 3.4 05/03/2019     05/03/2019    CO2 25 05/03/2019    BUN 4 05/03/2019    CREATININE 0.7 05/03/2019    GFRAA >60 05/03/2019    LABGLOM >60 05/03/2019    GLUCOSE 88 05/03/2019    PROT 5.7 05/03/2019    CALCIUM 8.1 05/03/2019    BILITOT 0.3 05/03/2019    ALKPHOS 320 05/03/2019    AST 35 05/03/2019    ALT 89 05/03/2019     Assessment/Plan:   63 y.o. male presented with   Chief Complaint   Patient presents with    Fever     symptoms starting this morning, states temp 102.3     Dizziness   fevers in immuncompromised pt  Concerning for GI/CLABSI  Feels better  cx neg 24 hours   Nurse informs me Pt and care team would like to see how pt responds off atbx  If fevers return suggest removal mediport          Imaging and labs were reviewed per medical records and any ID pertinent labs were addressed with the patient. The patient was educated about the diagnosis, prognosis, indications, risks and benefits of treatment. The patient is in agreement with the proposed medical treatment plan. An opportunity to ask questions was given to the patient and questions were answered.             Electronically signed by Rachelle Jimenez MD on 5/3/2019 at 9:12 AM

## 2019-05-03 NOTE — PROGRESS NOTES
5/3/2019 9:42 AM   [facility]     Subjective:     Amy Robledo is a 63 y.o. 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     Patient admitted with high Fevers without other symptom  Stable this am   no sig pain  Tmax 100.4 last 24 hrs    Current meds:    metronidazole (FLAGYL) 500 mg in NaCl 100 mL IVPB premix Q8H   LORazepam (ATIVAN) tablet 1 mg Nightly PRN   acetaminophen (TYLENOL) tablet 650 mg Q4H PRN   apixaban (ELIQUIS) tablet 5 mg BID   diphenoxylate-atropine (LOMOTIL) 2.5-0.025 MG per tablet 1 tablet 4x Daily PRN   escitalopram (LEXAPRO) tablet 5 mg Daily   lidocaine-prilocaine (EMLA) cream 1 each Daily PRN   oxyCODONE (ROXICODONE) immediate release tablet 5 mg Q4H PRN   lipase-protease-amylase (CREON) 08482 units delayed release capsule 6 capsule TID WC   lipase-protease-amylase (CREON) 80268 units delayed release capsule Daily PRN   pantoprazole (PROTONIX) tablet 40 mg Daily   promethazine (PHENERGAN) tablet 25 mg Q6H PRN   sennosides-docusate sodium (SENOKOT-S) 8.6-50 MG tablet 1 tablet Nightly PRN   cefepime (MAXIPIME) 2 g IVPB extended (mini-bag) Q12H   sodium chloride flush 0.9 % injection 10 mL 2 times per day   sodium chloride flush 0.9 % injection 10 mL PRN   magnesium hydroxide (MILK OF MAGNESIA) 400 MG/5ML suspension 30 mL Daily PRN   ondansetron (ZOFRAN) injection 4 mg Q6H PRN   potassium chloride (KLOR-CON M) extended release tablet 20 mEq Daily   vancomycin (VANCOCIN) 1,750 mg in dextrose 5 % 500 mL IVPB Q12H     Todays labs:    Recent Labs     05/01/19  0925 05/02/19  0602 05/03/19  0600   WBC 4.5 7.0 8.3   HGB 10.6* 8.6* 8.6*   PLT 98* 73* 74*   BUN 5* 6 4*   CREATININE 0.8 0.7 0.7                 ANC    4.0 K      Blood cultures neg to this point                            Objective:     Patient Vitals for the past 8 hrs:   BP Temp Temp src Pulse Resp SpO2   05/03/19 0744 132/82 98.9 °F (37.2 °C) Oral 64 18 99 %   Tmax 103  PE neg        Impression:             Plan:    On observation for high fevers with a (past history) of sepsis  Stable from Heme/onc standpoint

## 2019-05-04 NOTE — PROGRESS NOTES
Pharmacy was consulted to dose/monitor vancomycin which has now been discontinued. Clinical Pharmacy will sign off at this time.     Akin Cramer, PharmD 5/4/2019 11:27 AM

## 2019-05-04 NOTE — PLAN OF CARE
Patient with stable vital signs and oxygen levels. No falls or injuries noted. No signs or symptoms of shock or dehydration noted at this time.

## 2019-05-04 NOTE — PROGRESS NOTES
[facility]  PROGRESS NOTE  Admit Date:  2019   NAME:  Corrine Pino  MRN:  [id number]  :  1960  Age:   63 y.o. PCP:   Anne Valles DO, 61 [facility] Day: Hospital Day: 4    Subjective:   Pt was seen and examined in a face to face encounter for fevers with CC of fevers  Chief Complaint   Patient presents with    Fever     symptoms starting this morning, states temp 102.3     Dizziness     HPI: pt feels better  No fevers off atbx  No chills  Ready to go    ROS:  CONSTITUTIONAL:   As in hpi      Scheduled Meds:   apixaban  5 mg Oral BID    escitalopram  5 mg Oral Daily    lipase-protease-amylase  6 capsule Oral TID WC    pantoprazole  40 mg Oral Daily    sodium chloride flush  10 mL Intravenous 2 times per day    potassium chloride  20 mEq Oral Daily     Continuous Infusions:    PRN Meds:LORazepam, acetaminophen, diphenoxylate-atropine, lidocaine-prilocaine, oxyCODONE, lipase-protease-amylase, promethazine, sennosides-docusate sodium, sodium chloride flush, magnesium hydroxide, ondansetron    ALLERGIES: Rivaroxaban    Objective:   Vitals reviewed. Vitals:    19 1459 19 2045 19 0300 19 0730   BP: 133/79 122/68 138/72 138/76   Pulse: 72 71 68 55   Resp: 16 16 16 18   Temp: 99.2 °F (37.3 °C) 98.1 °F (36.7 °C) 98.8 °F (37.1 °C) 98.4 °F (36.9 °C)   TempSrc: Oral   Oral   SpO2: 99% 97% 95% 98%   Weight:       Height:         Physical Exam  Constitutional: The patient is awake, alert, and oriented. Skin: Warm and dry. No rashes were noted. tattoos  HEENT: at/nc      Extremities: ambulating   CNS: awake and alert  Psych:  pleasant  Lines: peripheral  right mediport    I & O - 24hr:    Intake/Output Summary (Last 24 hours) at 2019 1340  Last data filed at 2019 0716  Gross per 24 hour   Intake 1200 ml   Output --   Net 1200 ml     I/O this shift:   In: 12 [P.O.:960]  Out: -    Weight change:   LABS:    Recent Labs 05/02/19  0602 05/03/19  0600 05/04/19  0713   WBC 7.0 8.3 5.7   HGB 8.6* 8.6* 9.3*   HCT 27.9* 28.2* 29.8*   .4* 100.7* 98.3   PLT 73* 74* 79*     Recent Labs     05/02/19  0602 05/03/19  0600 05/04/19  0713    143 141   K 3.4* 3.4* 3.6   * 109* 105   CO2 25 25 27   BUN 6 4* 3*   CREATININE 0.7 0.7 0.8   GFRAA >60 >60 >60   LABGLOM >60 >60 >60   GLUCOSE 84 88 91   PROT 5.4* 5.7* 6.0*   LABALBU 2.9* 3.1* 3.2*   CALCIUM 8.1* 8.1* 8.3*   BILITOT 0.3 0.3 0.3   ALKPHOS 432* 320* 301*   AST 79* 35 23   * 89* 67*        Lab Results   Component Value Date    SEDRATE 55 (H) 05/02/2019     Lab Results   Component Value Date    CRP 9.7 (H) 05/02/2019       MICROBIOLOGY:        Organism   Date Value Ref Range Status   11/13/2018 Clostridium perfringens (A)  Final       Urine Culture, Routine   Date Value Ref Range Status   05/01/2019 Growth not present  Final   11/13/2018 <10,000 CFU/mL  Gram positive organism    Final     Organism   Date Value Ref Range Status   11/13/2018 Clostridium perfringens (A)  Final       Recent Labs     05/01/19  1700   STREPNEUMAGU Presumptive NEGATIVE for Pneumococcal pneumonia, suggesting  no current or recent pneumococcal infection. Infection due  to S. pneumoniae cannot be ruled out since the antigen present  in the sample may be below the detection limit of the test.         RADIOLOGY:  CTA ABDOMEN PELVIS W CONTRAST   Final Result   Normal CTA of the abdomen and pelvis. 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 The inferior mesenteric vein is enlarged, as likely this is the   collateral flow pathway for the mesenteric veins. CT ABDOMEN PELVIS W IV CONTRAST Additional Contrast? None   Final Result      1.  Essentially

## 2019-05-05 NOTE — DISCHARGE SUMMARY
[address]                               DISCHARGE SUMMARY    PATIENT NAME: Federica Telles                     :        1960  MED REC NO:   [id number]                            ROOM:       4549  ACCOUNT NO:   [de-identified]                           ADMIT DATE: 2019  PROVIDER:     Anu Cordon DO                  DISCHARGE DATE:  2019      ADMITTING DIAGNOSIS:  Fever with the patient receiving chemotherapy,  meeting sepsis criteria on admission. SECONDARY DISCHARGE DIAGNOSES:  Pancreatic adenocarcinoma, status post  Whipple procedure, undergoing chemotherapy; history of positive blood  culture with E. faecium, 2018; thrombosis of mesenteric vein;  obstructive sleep apnea; anxiety; history of DVT. COMPLICATIONS:  None. OPERATIONS:  None. CONSULTATIONS OBTAINED:  Dr. Alysia King and Dr. Lucas Prior. No OMT was  given. ADMITTING PHYSICIANS:  Dr. Luis Angel Menezes and Dr. Denzel Ramos. CHIEF COMPLAINT AND HISTORY OF CHIEF COMPLAINT:  A 67-year-old white  male who was admitted to [address] [place] [address]. The patient presented to  the hospital here on 2019. The patient presented to the hospital  here with known history of pancreatic cancer, for which he is followed  by Dr. Huyen Angeles. The patient had a Whipple procedure done at [facility] last year. The patient presented to hospital here and underwent  chemotherapy 2 weeks ago and was told that he had a white blood count  which was low and watch the fever. The patient presented to the  hospital here with an elevated temperature. PAST MEDICAL HISTORY:  Positive for the usual childhood diseases;  history of anticoagulant therapy, long term use; anxiety; back pain;  cancer; degenerative joint disease; DVT; neutropenic fever; panic  disorder; sleep apnea.      PHYSICAL EXAMINATION:  VITAL SIGNS:  Showed blood pressure to be 82/50, asymptomatic; pulse 84;  respirations 18. Afebrile. GENERAL APPEARANCE:  This is a pleasant 78-year-old white male. HEENT:  Normal.  LUNGS:  Coarse. HEART:  Fairly regular without ectopy. ABDOMEN:  Soft. EXTREMITIES:  No edema. Medi-Port present in the right clavicular area. While the patient was in the hospital, he had the following laboratory  studies performed:  CBC did show mild pancytopenia. White count was  5000, hemoglobin and hematocrit were 9.3 and 29.8 respectively, platelet  count 02,854. Chem-1 profile, otherwise, was satisfactory. Alk phos  was at 301, ALT was 67. Blood culture was negative, and further lab  work was satisfactory. HOSPITAL COURSE OF STAY:  The patient was admitted directly to the  hospital to the general immediate care unit. The patient was admitted  under the service of Dr. Stephie Calderon and Dr. Martínez. Panculture was obtained  along with the institution of antibiotic therapy. At this time, we were  debating whether or not to remove the [address] [place] [address] Road. At this time, the  temperature did defervesce. Culture came back negative. Antibiotics  were discontinued. He remained afebrile and was felt stable enough to  be discharged home on 05/04. At the time of discharge, the patient's vital signs were stable. His  blood pressure was 138/76, pulse 80, respirations 18. He was afebrile. The patient will be followed up by his primary care doctor, myself,  along with Oncology. He will be discharged home at the discretion of  Infectious Disease. The patient was discharged on Creon 36,000 two capsules t.i.d., Lomotil  2.5/0.025 daily, Eliquis 5 mg b.i.d., Adrucil 600 mg every 21 days,  Camptosar 100 subcu every 21 days, leucovorin 500 every 21 days, _____ 5  daily, Lidoderm patch as needed, loperamide 10 mg daily p.r.n., Eloxatin  130 mg every 21 days, oxycodone 5 mg q.4 p.r.n., Protonix 40 daily,  potassium 20 daily, Phenergan as needed along with Senna.     The patient did verbalize understanding. At the time of discharge, he  was much improved. The patient was given full instructions to return if  any fever or temperature to rise. Plan at this time is to sent him home  with the [address] [place] [address], and if any recurrent symptomatology, we would  recommend removal of the Medi-Port catheter at this time. More than 30 minutes were spent on the day of discharge with more than  50% of the time spent face-to-face with the patient discussing plan of  care and treatment at this time.         [address] [place] [address],     D: 05/04/2019 13:30:39       T: 05/05/2019 13:01:23     JORGE [email or web address]  Job#: [id number]     Doc#: [id number]    CC:

## 2019-06-10 PROBLEM — R50.9 FEVER: Status: ACTIVE | Noted: 2019-01-01

## 2019-06-10 NOTE — ED PROVIDER NOTES
Patient is a 56-year-old male who presents a chief complaint of headache and fever. If that around 1:00 PM today he started having onset of frontal headache and a pressure sensation behind his eyes. He denies any lightheadedness, dizziness, neck pain, neck stiffness. He did have a fever of 102 prior to arrival. He took Tylenol at that time and then took a nap. Patient woke up around 3:00 PM but was still not feeling well. He presents to the emergency department. The patient has a history of pancreatic cancer and just finished chemotherapy 3 weeks ago. The patient denies any chest pain, shortness of breath, abdominal pain, nausea and vomiting. The history is provided by the patient. No  was used. Review of Systems   Constitutional: Positive for fever. Negative for chills and fatigue. HENT: Negative for congestion, rhinorrhea, sinus pressure, sinus pain, sneezing and sore throat. Eyes: Negative for photophobia and visual disturbance. Respiratory: Negative for cough, shortness of breath and wheezing. Cardiovascular: Negative for chest pain and palpitations. Gastrointestinal: Negative for abdominal pain, constipation, diarrhea, nausea and vomiting. Genitourinary: Negative for dysuria, frequency and hematuria. Musculoskeletal: Negative for back pain, neck pain and neck stiffness. Skin: Negative for rash and wound. Neurological: Positive for headaches. Negative for dizziness and light-headedness. Psychiatric/Behavioral: Negative for agitation, behavioral problems and confusion. Physical Exam   Constitutional: He is oriented to person, place, and time. He appears well-developed and well-nourished. No distress. HENT:   Head: Normocephalic and atraumatic. Normal TMs bilaterally. No sinus tenderness to palpation. Eyes: Conjunctivae are normal. Right eye exhibits no discharge. Left eye exhibits no discharge. No scleral icterus. Neck: Normal range of motion. Neck supple. Neck is supple, normal range of motion. No meningeal signs present. Cardiovascular: Regular rhythm. Tachycardia present. No murmur heard. Pulmonary/Chest: Effort normal and breath sounds normal. No stridor. No respiratory distress. Abdominal: Soft. Bowel sounds are normal. He exhibits no distension. There is no tenderness. Abdominal surgical scar present. Abdomen is soft, nondistended. No guarding rigidity. No abdominal tenderness to palpation. Musculoskeletal: Normal range of motion. He exhibits no edema or tenderness. Lymphadenopathy:     He has no cervical adenopathy. Neurological: He is alert and oriented to person, place, and time. Patient is awake, alert, and oriented ×3. No focal neurological deficits. Skin: Skin is warm. Capillary refill takes less than 2 seconds. He is not diaphoretic. No erythema. No pallor. Psychiatric: He has a normal mood and affect. His behavior is normal.       Procedures    [facility]            --------------------------------------------- PAST HISTORY ---------------------------------------------  Past Medical History:  has a past medical history of Anticoagulant long-term use, Anxiety, Back pain, Cancer ([facility] [address] DDD (degenerative disc disease), lumbar, DVT, Neutropenic fever ([facility] [address] Panic attacks, Sleep apnea, and Unspecified sleep apnea. Past Surgical History:  has a past surgical history that includes Knee arthroscopy (1987); Rotator cuff repair (Right); Nerve Block (11/25/13); Nerve Block (Right, 12/011/13); Nerve Block (Right, 12/18/13); Nerve Block (02/07/14); Pancreas surgery; pr echo transesophag r-t 2d img acquisj i&r only (N/A, 11/16/2018); shoulder surgery (Left); and Patella surgery (Right, 1987). Social History:  reports that he quit smoking about 11 years ago. He has a 20.00 pack-year smoking history. He has never used smokeless tobacco. He reports that he does not drink alcohol or use drugs.     Family History: family history includes COPD in his father; Cancer in his brother and father; Diabetes in his father; Heart Disease in his mother; Other in his father and sister. The patients home medications have been reviewed.     Allergies: Rivaroxaban    -------------------------------------------------- RESULTS -------------------------------------------------  Labs:  Results for orders placed or performed during the hospital encounter of 06/09/19   Troponin   Result Value Ref Range    Troponin <0.01 0.00 - 0.03 ng/mL   CBC Auto Differential   Result Value Ref Range    WBC 9.6 4.5 - 11.5 E9/L    RBC 3.16 (L) 3.80 - 5.80 E12/L    Hemoglobin 9.6 (L) 12.5 - 16.5 g/dL    Hematocrit 30.4 (L) 37.0 - 54.0 %    MCV 96.2 80.0 - 99.9 fL    MCH 30.4 26.0 - 35.0 pg    MCHC 31.6 (L) 32.0 - 34.5 %    RDW 15.3 (H) 11.5 - 15.0 fL    Platelets 383 619 - 395 E9/L    MPV 9.1 7.0 - 12.0 fL    Neutrophils % 93.9 (H) 43.0 - 80.0 %    Lymphocytes % 5.2 (L) 20.0 - 42.0 %    Monocytes % 0.9 (L) 2.0 - 12.0 %    Eosinophils % 0.2 0.0 - 6.0 %    Basophils % 0.3 0.0 - 2.0 %    Neutrophils # 9.02 (H) 1.80 - 7.30 E9/L    Lymphocytes # 0.48 (L) 1.50 - 4.00 E9/L    Monocytes # 0.10 0.10 - 0.95 E9/L    Eosinophils # 0.00 (L) 0.05 - 0.50 E9/L    Basophils # 0.00 0.00 - 0.20 E9/L    Poikilocytes 1+     Ovalocytes 1+     Tear Drop Cells 1+    Basic Metabolic Panel   Result Value Ref Range    Sodium 138 132 - 146 mmol/L    Potassium 3.9 3.5 - 5.0 mmol/L    Chloride 102 98 - 107 mmol/L    CO2 24 22 - 29 mmol/L    Anion Gap 12 7 - 16 mmol/L    Glucose 111 (H) 74 - 99 mg/dL    BUN 8 6 - 20 mg/dL    CREATININE 0.8 0.7 - 1.2 mg/dL    GFR Non-African American >60 >=60 mL/min/1.73    GFR African American >60     Calcium 8.7 8.6 - 10.2 mg/dL   Lactic Acid, Plasma   Result Value Ref Range    Lactic Acid 1.6 0.5 - 2.2 mmol/L   Urinalysis   Result Value Ref Range    Color, UA Yellow Straw/Yellow    Clarity, UA Clear Clear    Glucose, Ur Negative Negative mg/dL    Bilirubin Urine Negative Negative    Ketones, Urine Negative Negative mg/dL    Specific Gravity, UA <=1.005 1.005 - 1.030    Blood, Urine Negative Negative    pH, UA 6.5 5.0 - 9.0    Protein, UA Negative Negative mg/dL    Urobilinogen, Urine 0.2 <2.0 E.U./dL    Nitrite, Urine Negative Negative    Leukocyte Esterase, Urine Negative Negative   EKG 12 Lead   Result Value Ref Range    Ventricular Rate 89 BPM    Atrial Rate 89 BPM    P-R Interval 144 ms    QRS Duration 88 ms    Q-T Interval 380 ms    QTc Calculation (Bazett) 462 ms    P Axis 54 degrees    R Axis 8 degrees    T Axis 19 degrees       Radiology:  CT Head WO Contrast   Final Result   1. Unremarkable noncontrast CT of the brain. XR CHEST PORTABLE   Final Result   1. Unremarkable portable chest.             ------------------------- NURSING NOTES AND VITALS REVIEWED ---------------------------  Date / Time Roomed:  6/9/2019  4:44 PM  ED Bed Assignment:  GILBERT/GILBERT    The nursing notes within the ED encounter and vital signs as below have been reviewed. /67   Pulse 104   Temp 99.4 °F (37.4 °C) (Oral)   Resp 16   Ht 5' 10\" (1.778 m)   Wt 189 lb 1 oz (85.8 kg)   SpO2 97%   BMI 27.13 kg/m²   Oxygen Saturation Interpretation: Normal    EKG: This EKG is signed and interpreted by me. Rate: 89  Rhythm: Sinus  Interpretation: Sinus rhythm, no acute ischemic changes. Comparison: No acute ischemic changes from comparison EKG on 5/19/18.    ------------------------------------------ PROGRESS NOTES ------------------------------------------  Spoke with on call hematology. They state that the patient can be discharged home. He states as well as the patient's white count is normal, and his laboratory results are within normal range, he can be discharged home. I discussed with the patient, he is agreeable for discharge. He stated he is feeling better. He will return if worsening symptoms. No further questions at this time.     I have spoken with the patient and discussed todays results, in addition to providing specific details for the plan of care and counseling regarding the diagnosis and prognosis. Their questions are answered at this time and they are agreeable with the plan. I discussed at length with them reasons for immediate return here for re evaluation. They will followup with primary care by calling their office tomorrow. --------------------------------- ADDITIONAL PROVIDER NOTES ---------------------------------  At this time the patient is without objective evidence of an acute process requiring hospitalization or inpatient management. They have remained hemodynamically stable throughout their entire ED visit and are stable for discharge with outpatient follow-up. The plan has been discussed in detail and they are aware of the specific conditions for emergent return, as well as the importance of follow-up. Discharge Medication List as of 6/9/2019  8:00 PM          Diagnosis:  1. Acute nonintractable headache, unspecified headache type    2. Fever, unspecified fever cause        Disposition:  Patient's disposition: Discharge to home  Patient's condition is stable.               Samuel Morales DO  Resident  06/09/19 5166

## 2019-06-11 NOTE — PROGRESS NOTES
HPI:  Walter Roman presented to [facility] emergency department yesterday for the evaluation of recurrent and refractory febrile illness. In the setting of pancreatic cancer with recent completion of chemotherapy, he was admitted to the hospital to rule out bacteremia. The patient does have an indwelling Mediport but this does not appear to be acutely infected on examination. He remains febrile this morning. He feels ill overall. He denies any infectious symptomatology such as upper respiratory complaints, diarrhea, or dysuria. No family members were present during my examination. Patient voiced no new complaints since hospital admission. Questions, answers, and tests reviewed. ROS:  Cardiovascular:   Denies any chest pain, irregular heartbeats, or palpitations. Respiratory:   Denies shortness of breath, coughing, sputum production, hemoptysis, or wheezing. Gastrointestinal:   Denies nausea, vomiting, diarrhea, or constipation. Denies any abdominal pain. Extremities:   Denies any lower extremity swelling or edema. Neurology:    Denies any headache or focal neurological deficits. Admits to generalized weakness and deconditioning. Derm:    Denies any rashes, ulcers, or excoriations. Denies bruising. Genitourinary:    Denies any urgency, frequency, hematuria. Voiding without difficulty. Physical Exam:    Vitals:    06/10/19 2230   BP: 127/76   Pulse: 76   Resp: 20   Temp: 100.2 °F (37.9 °C)   SpO2: 96%       HEENT:  PERRLA. EOMI. Sclera clear. Buccal mucosa moist.    Neck:  Supple. Trachea midline. No thyromegaly. No JVD. No bruits. Heart:  Rhythm regular, rate controlled. No murmurs. Lungs:  Symmetrical. Clear to auscultation bilaterally. No wheezes. No rhonchi. No rales. Abdomen: Soft. Non-tender. Non-distended. Bowel sounds positive. No organomegaly or masses. No pain on palpation    Extremities:  Peripheral pulses present. No peripheral edema. No ulcers.     Neurologic:  Alert x 3.  No focal deficit. Cranial nerves grossly intact. Skin:  No petechia. No hemorrhage. No wounds. CBC with Differential:    Lab Results   Component Value Date    WBC 7.7 06/11/2019    RBC 2.96 06/11/2019    HGB 9.0 06/11/2019    HCT 28.7 06/11/2019     06/11/2019    MCV 97.0 06/11/2019    MCH 30.4 06/11/2019    MCHC 31.4 06/11/2019    RDW 15.3 06/11/2019    SEGSPCT 85 12/20/2012    LYMPHOPCT 8.7 06/11/2019    MONOPCT 6.8 06/11/2019    MYELOPCT 2.0 11/19/2018    BASOPCT 0.3 06/11/2019    MONOSABS 0.52 06/11/2019    LYMPHSABS 0.67 06/11/2019    EOSABS 0.04 06/11/2019    BASOSABS 0.02 06/11/2019     CMP:    Lab Results   Component Value Date     06/11/2019    K 3.7 06/11/2019     06/11/2019    CO2 27 06/11/2019    BUN 6 06/11/2019    CREATININE 0.7 06/11/2019    GFRAA >60 06/11/2019    LABGLOM >60 06/11/2019    GLUCOSE 85 06/11/2019    PROT 6.3 06/11/2019    LABALBU 3.1 06/11/2019    CALCIUM 8.1 06/11/2019    BILITOT 0.4 06/11/2019    ALKPHOS 289 06/11/2019    AST 33 06/11/2019    ALT 60 06/11/2019       Other Data:    No intake or output data in the 24 hours ending 06/11/19 0817      Scheduled Medications:   escitalopram  5 mg Oral Daily    pantoprazole  40 mg Oral Daily    lipase-protease-amylase  6 capsule Oral TID WC    potassium chloride  20 mEq Oral Daily    piperacillin-tazobactam  3.375 g Intravenous Q8H    sodium chloride flush  10 mL Intravenous 2 times per day    apixaban  5 mg Oral BID         Infusion Medications:   lactated ringers 100 mL/hr at 06/10/19 6823       Assessment:   1. Febrile illness in an immunocompromised host with concern for bacteremia in the setting of an indwelling Mediport  2. Pancreatic adenocarcinoma s/p Whipple procedure having completed chemotherapy recently  3. History of positive blood cultures for E. Faecium 9/25/18  4. OVIDIO not currently using CPAP  5. Anxiety  6.  History of DVT and PE in 1990s    Plan:   Aside from generalized weakness, the patient has no infectious symptomatology. He remains febrile at this point and broad-spectrum antibiotic therapy is being employed. The infectious disease and oncologic teams will provide consultation. If bacteremia were identified, transesophageal echocardiogram would be necessary and consideration for Mediport removal would be undertaken. IV fluid resuscitation will be undertaken. Continue current therapy. See orders for further plan of care.     Danay Oconnor D.O.  8:17 AM  6/11/2019

## 2019-06-11 NOTE — PLAN OF CARE
Problem: Nausea/Vomiting:  Goal: Absence of nausea/vomiting  Description  Absence of nausea/vomiting  Outcome: Met This Shift  Goal: Able to drink  Description  Able to drink  Outcome: Met This Shift  Goal: Able to eat  Description  Able to eat  Outcome: Met This Shift     Problem: Pain:  Goal: Pain level will decrease  Description  Pain level will decrease  Outcome: Met This Shift  Goal: Control of acute pain  Description  Control of acute pain  Outcome: Met This Shift  Goal: Control of chronic pain  Description  Control of chronic pain  Outcome: Met This Shift

## 2019-06-11 NOTE — PROGRESS NOTES
you for the consult,    Vickie Victoria PharmD, BCPS 6/11/2019 8:37 AM  Pager: [phone]  Ext: [phone]

## 2019-06-11 NOTE — ED PROVIDER NOTES
Patient is a 81-year-old male who presents with chief complaint of fever. The patient was seen and evaluate by me yesterday in the emergency department. The patient presented with a fever that started around 1:00 PM yesterday. The patient was worked up in the emergency department, no abnormalities were found on the laboratory work or urinalysis. Patient had a headache at that time, head CT was normal.  I contacted oncology, and they stated that the patient was okay to go home. The patient states that he is spiked another fever of 102 earlier today. At 2:00 PM today he took Tylenol. He still admits to fever, chills, myalgias, and headache. Patient denies any neck stiffness or neck pain. He denies any lightheadedness, dizziness, chest pain, shortness of breath, abdominal pain, nausea, vomiting. The patient has a known history of pancreatic cancer with a Whipple procedure in September 2018. His last chemo treatment was 4 weeks ago. The history is provided by the patient. No  was used. Review of Systems   Constitutional: Positive for chills, fatigue and fever. HENT: Negative for congestion, rhinorrhea, sinus pressure, sinus pain, sneezing and sore throat. Eyes: Negative for photophobia and visual disturbance. Respiratory: Negative for cough, shortness of breath and wheezing. Cardiovascular: Negative for chest pain and palpitations. Gastrointestinal: Negative for abdominal pain, constipation, diarrhea, nausea and vomiting. Genitourinary: Negative for dysuria, frequency and hematuria. Musculoskeletal: Negative for back pain, neck pain and neck stiffness. Skin: Negative for rash and wound. Neurological: Positive for headaches. Negative for dizziness, light-headedness and numbness. Psychiatric/Behavioral: Negative for agitation, behavioral problems and confusion. Physical Exam   Constitutional: He is oriented to person, place, and time.  He appears well-developed and well-nourished. No distress. HENT:   Head: Normocephalic and atraumatic. Normal TMs bilaterally. No sinus tenderness to palpation. Moist oral mucosa, no tonsillar exudate. Eyes: Pupils are equal, round, and reactive to light. Conjunctivae are normal. Right eye exhibits no discharge. Left eye exhibits no discharge. No scleral icterus. Neck: Normal range of motion. Neck supple. Neck is supple, normal range of motion. No meningeal signs present. Cardiovascular: Normal rate and regular rhythm. No murmur heard. Pulmonary/Chest: Effort normal and breath sounds normal. No stridor. No respiratory distress. Clear breath sounds in all lung fields. No acute respiratory distress. Pulse ox 97% on room air. Abdominal: Soft. Bowel sounds are normal. He exhibits no distension. There is no tenderness. Musculoskeletal: Normal range of motion. He exhibits no edema or tenderness. Neurological: He is alert and oriented to person, place, and time. No cranial nerve deficit or sensory deficit. GCS eye subscore is 4. GCS verbal subscore is 5. GCS motor subscore is 6. Patient is awake, alert, oriented x3. No focal neurological deficits. Skin: Skin is warm. Capillary refill takes less than 2 seconds. He is not diaphoretic. No erythema. No pallor. Psychiatric: He has a normal mood and affect. His behavior is normal.       Procedures    [facility]       --------------------------------------------- PAST HISTORY ---------------------------------------------  Past Medical History:  has a past medical history of Anticoagulant long-term use, Anxiety, Back pain, Cancer ([address] DDD (degenerative disc disease), lumbar, DVT, Neutropenic fever ([facility] Panic attacks, Sleep apnea, and Unspecified sleep apnea. Past Surgical History:  has a past surgical history that includes Knee arthroscopy (1987); Rotator cuff repair (Right); Nerve Block (11/25/13); Nerve Block (Right, 12/011/13);  Nerve Block (Right, 12/18/13); Nerve Block (02/07/14); Pancreas surgery; pr echo transesophag r-t 2d g acquisj i&r only (N/A, 11/16/2018); shoulder surgery (Left); and Patella surgery (Right, 1987). Social History:  reports that he quit smoking about 11 years ago. He has a 20.00 pack-year smoking history. He has never used smokeless tobacco. He reports that he does not drink alcohol or use drugs. Family History: family history includes COPD in his father; Cancer in his brother and father; Diabetes in his father; Heart Disease in his mother; Other in his father and sister. The patients home medications have been reviewed.     Allergies: Rivaroxaban    -------------------------------------------------- RESULTS -------------------------------------------------    LABS:  Results for orders placed or performed during the hospital encounter of 06/10/19   CBC Auto Differential   Result Value Ref Range    WBC 8.1 4.5 - 11.5 E9/L    RBC 2.95 (L) 3.80 - 5.80 E12/L    Hemoglobin 8.8 (L) 12.5 - 16.5 g/dL    Hematocrit 28.6 (L) 37.0 - 54.0 %    MCV 96.9 80.0 - 99.9 fL    MCH 29.8 26.0 - 35.0 pg    MCHC 30.8 (L) 32.0 - 34.5 %    RDW 15.4 (H) 11.5 - 15.0 fL    Platelets 101 360 - 513 E9/L    MPV 9.9 7.0 - 12.0 fL    Neutrophils % 82.1 (H) 43.0 - 80.0 %    Immature Granulocytes % 0.4 0.0 - 5.0 %    Lymphocytes % 9.4 (L) 20.0 - 42.0 %    Monocytes % 7.2 2.0 - 12.0 %    Eosinophils % 0.5 0.0 - 6.0 %    Basophils % 0.4 0.0 - 2.0 %    Neutrophils # 6.67 1.80 - 7.30 E9/L    Immature Granulocytes # 0.03 E9/L    Lymphocytes # 0.76 (L) 1.50 - 4.00 E9/L    Monocytes # 0.58 0.10 - 0.95 E9/L    Eosinophils # 0.04 (L) 0.05 - 0.50 E9/L    Basophils # 0.03 0.00 - 0.20 E9/L   Urinalysis   Result Value Ref Range    Color, UA Yellow Straw/Yellow    Clarity, UA Clear Clear    Glucose, Ur Negative Negative mg/dL    Bilirubin Urine Negative Negative    Ketones, Urine Negative Negative mg/dL    Specific Gravity, UA <=1.005 1.005 - 1.030    Blood, Urine % 5' 10\" (1.778 m) 185 lb 4 oz (84 kg)       Oxygen Saturation Interpretation: Normal    ------------------------------------------ PROGRESS NOTES ------------------------------------------  Re-evaluation(s):  Time: 21:28. Patients symptoms show no change  Repeat physical examination is not changed    Counseling:  I have spoken with the patient and discussed todays results, in addition to providing specific details for the plan of care and counseling regarding the diagnosis and prognosis. Their questions are answered at this time and they are agreeable with the plan of admission.    --------------------------------- ADDITIONAL PROVIDER NOTES ---------------------------------  Consultations:  Time: 19:57. Spoke with Dr. Manuel Alvarez. Discussed case. They will admit the patient. This patient's ED course included: a personal history and physicial examination, re-evaluation prior to disposition, IV medications, cardiac monitoring and continuous pulse oximetry    This patient has remained hemodynamically stable during their ED course. Diagnosis:  1. Fever, unspecified fever cause        Disposition:  Patient's disposition: Admit to telemetry  Patient's condition is stable.            Nathalie Saul DO  Resident  06/10/19 4829

## 2019-06-11 NOTE — PLAN OF CARE
Problem: Nausea/Vomiting:  Goal: Absence of nausea/vomiting  Description  Absence of nausea/vomiting  6/11/2019 1317 by Cindy Zapata RN  Outcome: Met This Shift     Problem: Nausea/Vomiting:  Goal: Able to drink  Description  Able to drink  6/11/2019 1317 by Cindy Zapata RN  Outcome: Met This Shift     Problem: Nausea/Vomiting:  Goal: Able to eat  Description  Able to eat  6/11/2019 1317 by Cindy Zapata RN  Outcome: Met This Shift     Problem: Pain:  Goal: Pain level will decrease  Description  Pain level will decrease  6/11/2019 1317 by Cindy Zapata RN  Outcome: Met This Shift     Problem: Pain:  Goal: Control of acute pain  Description  Control of acute pain  6/11/2019 1317 by Cindy Zapata RN  Outcome: Met This Shift     Problem: Pain:  Goal: Control of chronic pain  Description  Control of chronic pain  6/11/2019 1317 by Cindy Zapata RN  Outcome: Met This Shift

## 2019-06-11 NOTE — PROGRESS NOTES
6/11/2019 9:41 AM   [facility]     Subjective:     Lety Martin is a 63 y.o. 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 recently finished 6 months was to have PET scan         Patient admitted with fever without other consitutional symptoms  Tmax 101.2   blood culture pending  Urine clean    Current meds:  0.9% NaCl with KCl 40 mEq infusion Continuous   vancomycin (VANCOCIN) 1,750 mg in dextrose 5 % 500 mL IVPB Q12H   diphenoxylate-atropine (LOMOTIL) 2.5-0.025 MG per tablet 1 tablet 4x Daily PRN   escitalopram (LEXAPRO) tablet 5 mg Daily   loperamide (IMODIUM) capsule 2 mg 4x Daily PRN   oxyCODONE (ROXICODONE) immediate release tablet 5 mg Q4H PRN   lipase-protease-amylase (CREON) 74794 units delayed release capsule 3 capsule Daily PRN   pantoprazole (PROTONIX) tablet 40 mg Daily   lipase-protease-amylase (CREON) 26277 units delayed release capsule 6 capsule TID WC   potassium chloride (KLOR-CON M) extended release tablet 20 mEq Daily   promethazine (PHENERGAN) tablet 25 mg Q6H PRN   piperacillin-tazobactam (ZOSYN) 3.375 g in dextrose 5 % 50 mL IVPB extended infusion (mini-bag) Q8H   sodium chloride flush 0.9 % injection 10 mL 2 times per day   sodium chloride flush 0.9 % injection 10 mL PRN   magnesium hydroxide (MILK OF MAGNESIA) 400 MG/5ML suspension 30 mL Daily PRN   ondansetron (ZOFRAN) injection 4 mg Q6H PRN   acetaminophen (TYLENOL) tablet 650 mg Q4H PRN   apixaban (ELIQUIS) tablet 5 mg BID     Todays labs:  Recent Labs     06/09/19  1802 06/10/19  2053 06/11/19  0230 06/11/19  0520   WBC 9.6 8.1  --  7.7   HGB 9.6* 8.8*  --  9.0*    162  --  167   BUN 8  --  6 6   CREATININE 0.8  --  0.7 0.7                                                     Objective:     No data found.   PE: no trush   abd soft   lungs clear        Impression:     Principal Problem:    Sepsis ([address]  Active Problems:    Fever  Resolved Problems:    * No resolved hospital problems.  *          Plan:     Improved with IVF   still with persistant fevers   For further observation
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
## 2019-06-11 NOTE — PROGRESS NOTES
Pharmacy Consultation Note  (Antibiotic Dosing and Monitoring)      Pharmacy is following for Vancomycin dosing. Allergies:  Rivaroxaban    63 y.o. male    Ht Readings from Last 1 Encounters:   06/10/19 5' 10\" (1.778 m)     Wt Readings from Last 1 Encounters:   06/10/19 185 lb 4 oz (84 kg)       Recent Labs     06/09/19  1802 06/10/19  2053   WBC 9.6 8.1       Recent Labs     06/09/19  1802   BUN 8   CREATININE 0.8       Estimated Creatinine Clearance: 105 mL/min (based on SCr of 0.8 mg/dL).     No intake or output data in the 24 hours ending 06/11/19 0006    Cultures:  available culture and sensitivity results were reviewed in EPIC      Assessment:  Consulted by Dr. Thelma Eaton to dose/monitor vancomycin  Estimated CrCl = 105 mL/min  Goal trough level = 15-20 mcg/mL    Plan:  Vancomycin 1750 mg x 1 dose given 6/11/19 @ 00:05  Monitor renal function   Clinical pharmacy will follow up and complete full consult note      Rsahi Waldron [facility] 6/11/2019 12:06 AM

## 2019-06-12 NOTE — PROGRESS NOTES
HPI:  Kristy Aquinoece is doing very well today. He remains afebrile at this point and cultures remain negative. He has no acute complaints and believes he has returned to his baseline. No family members were present during my examination today. Patient voiced no new complaints since hospital admission. Questions, answers, and tests reviewed. ROS:  Cardiovascular:   Denies any chest pain, irregular heartbeats, or palpitations. Respiratory:   Denies shortness of breath, coughing, sputum production, hemoptysis, or wheezing. Gastrointestinal:   Denies nausea, vomiting, diarrhea, or constipation. Denies any abdominal pain. Extremities:   Denies any lower extremity swelling or edema. Neurology:    Denies any headache or focal neurological deficits. Admits to generalized weakness and deconditioning. Derm:    Denies any rashes, ulcers, or excoriations. Denies bruising. Genitourinary:    Denies any urgency, frequency, hematuria. Voiding without difficulty. Physical Exam:    Vitals:    06/11/19 2330   BP:    Pulse:    Resp:    Temp:    SpO2: 96%       HEENT:  PERRLA. EOMI. Sclera clear. Buccal mucosa moist.    Neck:  Supple. Trachea midline. No thyromegaly. No JVD. No bruits. Heart:  Rhythm regular, rate controlled. No murmurs. Lungs:  Symmetrical. Clear to auscultation bilaterally. No wheezes. No rhonchi. No rales. Abdomen: Soft. Non-tender. Non-distended. Bowel sounds positive. No organomegaly or masses. No pain on palpation    Extremities:  Peripheral pulses present. No peripheral edema. No ulcers. Neurologic:  Alert x 3. No focal deficit. Cranial nerves grossly intact. Skin:  No petechia. No hemorrhage. No wounds.     CBC with Differential:    Lab Results   Component Value Date    WBC 5.5 06/12/2019    RBC 3.02 06/12/2019    HGB 9.1 06/12/2019    HCT 29.7 06/12/2019     06/12/2019    MCV 98.3 06/12/2019    MCH 30.1 06/12/2019    MCHC 30.6 06/12/2019    RDW 15.3 06/12/2019 SEGSPCT 85 12/20/2012    LYMPHOPCT 12.4 06/12/2019    MONOPCT 8.8 06/12/2019    MYELOPCT 2.0 11/19/2018    BASOPCT 0.5 06/12/2019    MONOSABS 0.48 06/12/2019    LYMPHSABS 0.68 06/12/2019    EOSABS 0.09 06/12/2019    BASOSABS 0.03 06/12/2019     CMP:    Lab Results   Component Value Date     06/12/2019    K 4.4 06/12/2019     06/12/2019    CO2 26 06/12/2019    BUN 6 06/12/2019    CREATININE 0.7 06/12/2019    GFRAA >60 06/12/2019    LABGLOM >60 06/12/2019    GLUCOSE 116 06/12/2019    PROT 6.3 06/12/2019    LABALBU 3.3 06/12/2019    CALCIUM 8.5 06/12/2019    BILITOT 0.3 06/12/2019    ALKPHOS 278 06/12/2019    AST 33 06/12/2019    ALT 50 06/12/2019       Other Data:      Intake/Output Summary (Last 24 hours) at 6/12/2019 0809  Last data filed at 6/12/2019 0449  Gross per 24 hour   Intake 1340 ml   Output 0 ml   Net 1340 ml         Scheduled Medications:   vancomycin  1,750 mg Intravenous Q12H    escitalopram  5 mg Oral Daily    pantoprazole  40 mg Oral Daily    lipase-protease-amylase  6 capsule Oral TID WC    potassium chloride  20 mEq Oral Daily    piperacillin-tazobactam  3.375 g Intravenous Q8H    sodium chloride flush  10 mL Intravenous 2 times per day    apixaban  5 mg Oral BID         Infusion Medications:   0.9% NaCl with KCl 40 mEq 100 mL/hr at 06/11/19 2051       Assessment:   1. Febrile illness in an immunocompromised host with concern for bacteremia in the setting of an indwelling Mediport  2. Pancreatic adenocarcinoma s/p Whipple procedure having completed chemotherapy recently  3. History of positive blood cultures for E. Faecium 9/25/18  4. OVIDIO not currently using CPAP  5. Anxiety  6. History of DVT and PE in 1990s    Plan: At this point, cultures remain negative and he has now been afebrile. Lab work and vital signs are otherwise stable.   He is maintained on broad-spectrum antibiotic therapy at the discretion of the infectious disease team.  If cultures remain negative, I

## 2019-06-12 NOTE — PLAN OF CARE
Problem: Nausea/Vomiting:  Goal: Absence of nausea/vomiting  Description  Absence of nausea/vomiting  6/12/2019 1800 by Rufino Lucas RN  Outcome: Met This Shift     Problem: Nausea/Vomiting:  Goal: Able to drink  Description  Able to drink  6/12/2019 1800 by Rufino Lucas RN  Outcome: Met This Shift     Problem: Pain:  Goal: Pain level will decrease  Description  Pain level will decrease  6/12/2019 1800 by Rufino Lucas RN  Outcome: Met This Shift     Problem: Pain:  Goal: Control of acute pain  Description  Control of acute pain  6/12/2019 1800 by Rufino Lucas RN  Outcome: Met This Shift     Problem: Falls - Risk of:  Goal: Will remain free from falls  Description  Will remain free from falls  6/12/2019 1800 by Rufino Lucas RN  Outcome: Met This Shift

## 2019-06-12 NOTE — PLAN OF CARE
Problem: Nausea/Vomiting:  Goal: Absence of nausea/vomiting  Description  Absence of nausea/vomiting  6/11/2019 2205 by Mark Victoria RN  Outcome: Met This Shift     Problem: Pain:  Goal: Pain level will decrease  Description  Pain level will decrease  6/11/2019 2205 by Mark Victoria RN  Outcome: Met This Shift

## 2019-06-12 NOTE — PLAN OF CARE
Problem: Nausea/Vomiting:  Goal: Absence of nausea/vomiting  Description  Absence of nausea/vomiting  6/12/2019 1010 by Eliel Medina RN  Outcome: Met This Shift     Problem: Nausea/Vomiting:  Goal: Able to drink  Description  Able to drink  Outcome: Met This Shift     Problem: Nausea/Vomiting:  Goal: Able to eat  Description  Able to eat  Outcome: Met This Shift     Problem: Pain:  Goal: Pain level will decrease  Description  Pain level will decrease  6/12/2019 1010 by Eliel Medina RN  Outcome: Met This Shift     Problem: Pain:  Goal: Control of acute pain  Description  Control of acute pain  Outcome: Met This Shift     Problem: Pain:  Goal: Control of chronic pain  Description  Control of chronic pain  Outcome: Met This Shift     Problem: Falls - Risk of:  Goal: Will remain free from falls  Description  Will remain free from falls  Outcome: Met This Shift     Problem: Falls - Risk of:  Goal: Absence of physical injury  Description  Absence of physical injury  Outcome: Met This Shift

## 2019-06-12 NOTE — PROGRESS NOTES
[facility]  [facility]  Progress Note    CC: feeling better, fevers improved    SUBJECTIVE:  Patient is tolerating medications. No reported adverse drug reactions. ROS: No nausea, vomiting, diarrhea. Fevers better. No rash. No shortness of breath- no abdominal pain. Medications:  Scheduled Meds:   vancomycin  1,750 mg Intravenous Q12H    escitalopram  5 mg Oral Daily    pantoprazole  40 mg Oral Daily    lipase-protease-amylase  6 capsule Oral TID WC    potassium chloride  20 mEq Oral Daily    piperacillin-tazobactam  3.375 g Intravenous Q8H    sodium chloride flush  10 mL Intravenous 2 times per day    apixaban  5 mg Oral BID     Continuous Infusions:   0.9% NaCl with KCl 40 mEq 100 mL/hr at 06/12/19 1001     PRN Meds:LORazepam, diphenoxylate-atropine, loperamide, oxyCODONE, lipase-protease-amylase, promethazine, sodium chloride flush, magnesium hydroxide, ondansetron, acetaminophen  OBJECTIVE:  Patient Vitals for the past 24 hrs:   BP Temp Temp src Pulse Resp SpO2   06/12/19 0915 (!) 140/78 99.1 °F (37.3 °C) Oral 76 20 96 %   06/11/19 2330 -- -- -- -- -- 96 %   06/11/19 2000 (!) 144/84 98.8 °F (37.1 °C) Oral 77 17 98 %   06/11/19 1516 -- -- -- -- -- 94 %     Constitutional: The patient is awake, alert, and oriented. Skin: Warm and dry. No rashes were noted. Tattoos   Head: Eyes show round, and reactive pupils. No jaundice. Mouth: Moist mucous membranes, no ulcerations, no thrush. Neck: Supple to movements. No lymphadenopathy. Chest: No use of accessory muscles to breathe. Symmetrical expansion. Auscultation reveals no wheezing, crackles, or rhonchi. Cardiovascular: S1 and S2 are rhythmic and regular. No murmurs appreciated. Abdomen: Positive bowel sounds to auscultation. Benign to palpation. soft  Extremities: No clubbing, no cyanosis, no edema.   Right chest with mediport- accessed     Laboratory and Tests Review:  Lab Results   Component Value Date    WBC

## 2019-06-12 NOTE — PROGRESS NOTES
Pharmacy Consultation Note  (Antibiotic Dosing and Monitoring)    Initial consult date: 6/10/19  Consulting physician: Dr. Elham Andersen  Drug(s): Vancomycin  Indication: Fever in Immunocompromised Patient    Ht Readings from Last 1 Encounters:   06/10/19 5' 10\" (1.778 m)     Wt Readings from Last 1 Encounters:   06/10/19 185 lb 4 oz (84 kg)     Age/  Gender IBW DW  Allergy Information   63 y.o.   male 73 kg 84 kg  Rivaroxaban          Date  Tmax WBC BUN/CR UOP  (mL/kg/hr) Drug/Dose Time   Given Level(s)   (Time) Comments   6/11  (#1) 98.6 7.7 6/0.7 Incomplete Vancomycin 1750 mg IV x 1    Vancomycin 1750 mg IV q12hr 0005    1216  2352     6/12  (#2) 99.1 5.5 6/0.7 \" Vancomycin 1750 mg IV q12hr <1200> Trough @ 1220 = 13.4 mcg/mL      (#3)             (#4)             (#5)             (#6)             (#7)             Estimated Creatinine Clearance: 120 mL/min (based on SCr of 0.7 mg/dL). UOP over the past 24 hours:     Intake/Output Summary (Last 24 hours) at 6/12/2019 1302  Last data filed at 6/12/2019 1245  Gross per 24 hour   Intake 1760 ml   Output 0 ml   Net 1760 ml     Other anti-infectives: Anti-infective Dose Date Initiated Date Stopped   Pip/tazo 3.375g IV q8hr 6/10      Cultures:  available culture and sensitivity results were reviewed in EPIC  Cultures sent and are pending. Culture Date Result    Blood cx 1 6/10 NGTD   Blood cx 2 6/10 GNR   Urine cx 6/10 <10,000 CFU/ml GPO   Respiratory film array 6/11 Negative   Respiratory cx            Assessment:  · Consulted by Dr. Lizarraga Congress to dose/monitor vancomycin  · Goal trough level:  15-20 mcg/mL  · Pt is a 63 yoM with history of pancreatic cancer, recently completing chemotherapy and with chronic indwelling Mediport, who presented from home with fever.   · Serum creatinine today: 0.7 mg/dL; CrCl > 100 mL/min; baseline Scr ~ 0.7 mg/dL  · 5/3/19: Trough = 16.1 mcg/mL on vancomycin 1750 mg IV q12hr  · 6/12: Trough = 13.4 (about 1 hour late)    Plan:  · Vancomycin 1750 mg IV q12hr  · Repeat trough as needed  · Follow renal function  · Pharmacist will follow and monitor/adjust dosing as necessary      Thank you for the consult,    Baljit DennisD, BCPS 6/12/2019 1:02 PM  Pager: [phone]  Ext: [phone]

## 2019-06-13 NOTE — PROGRESS NOTES
Pharmacy Consultation Note  (Antibiotic Dosing and Monitoring)    Vancomycin has been discontinued; pharmacy will sign-off. Please reconsult if needed.     Thank you,  Christine Chavez, PharmD, BCPS 6/13/2019 11:34 AM

## 2019-06-13 NOTE — PROGRESS NOTES
[address] [place] [address] [facility]  [facility]  Progress Note    CC: feels better, has had fevers overnight     SUBJECTIVE:  Patient is tolerating medications. No reported adverse drug reactions. Awaiting transfer to [facility] today. ROS: No nausea, vomiting, diarrhea. Fevers better. No rash. No shortness of breath- no abdominal pain. Medications:  Scheduled Meds:   escitalopram  5 mg Oral Daily    pantoprazole  40 mg Oral Daily    lipase-protease-amylase  6 capsule Oral TID WC    potassium chloride  20 mEq Oral Daily    piperacillin-tazobactam  3.375 g Intravenous Q8H    sodium chloride flush  10 mL Intravenous 2 times per day    apixaban  5 mg Oral BID     Continuous Infusions:   0.9% NaCl with KCl 40 mEq 100 mL/hr at 06/13/19 1416     PRN Meds:LORazepam, ibuprofen, LORazepam, diphenoxylate-atropine, loperamide, oxyCODONE, lipase-protease-amylase, promethazine, sodium chloride flush, magnesium hydroxide, ondansetron, acetaminophen  OBJECTIVE:  Patient Vitals for the past 24 hrs:   BP Temp Temp src Pulse Resp SpO2   06/13/19 0900 124/79 98.3 °F (36.8 °C) Oral 88 20 --   06/13/19 0800 109/81 98.1 °F (36.7 °C) Oral 75 20 98 %   06/13/19 0015 -- 98.4 °F (36.9 °C) Oral -- -- 98 %   06/12/19 2255 -- 99.4 °F (37.4 °C) -- -- -- --   06/12/19 2145 -- 102 °F (38.9 °C) -- -- -- --   06/12/19 2045 -- 102.8 °F (39.3 °C) -- -- -- --   06/12/19 1930 128/64 100.2 °F (37.9 °C) Oral 95 16 97 %   06/12/19 1600 -- -- -- 76 -- 97 %     Constitutional: The patient is awake, alert, and oriented. Skin: Warm and dry. No rashes were noted. Tattoos   Head: Eyes show round, and reactive pupils. No jaundice. Mouth: Moist mucous membranes, no ulcerations, no thrush. Neck: Supple to movements. No lymphadenopathy. Chest: No use of accessory muscles to breathe. Symmetrical expansion. Auscultation reveals no wheezing, crackles, or rhonchi. Cardiovascular: S1 and S2 are rhythmic and regular. No murmurs appreciated. Abdomen: Positive bowel sounds to auscultation. Benign to palpation. soft  Extremities: No clubbing, no cyanosis, no edema. Right chest with mediport- accessed     Laboratory and Tests Review:  Lab Results   Component Value Date    WBC 5.4 06/13/2019    WBC 5.5 06/12/2019    WBC 7.7 06/11/2019    HGB 9.2 (L) 06/13/2019    HCT 30.2 (L) 06/13/2019    MCV 96.5 06/13/2019     06/13/2019     Lab Results   Component Value Date    NEUTROABS 4.16 06/13/2019    NEUTROABS 4.18 06/12/2019    NEUTROABS 6.42 06/11/2019     Lab Results   Component Value Date    CRP 9.7 (H) 05/02/2019     Lab Results   Component Value Date    SEDRATE 55 (H) 05/02/2019     Lab Results   Component Value Date     (H) 06/13/2019     (H) 06/13/2019    ALKPHOS 563 (H) 06/13/2019    BILITOT 0.7 06/13/2019     Lab Results   Component Value Date     06/13/2019    K 4.8 06/13/2019     06/13/2019    CO2 25 06/13/2019    BUN 7 06/13/2019    CREATININE 0.7 06/13/2019    GFRAA >60 06/13/2019    LABGLOM >60 06/13/2019    GLUCOSE 93 06/13/2019    PROT 6.6 06/13/2019    LABALBU 3.3 06/13/2019    CALCIUM 8.6 06/13/2019    BILITOT 0.7 06/13/2019    ALKPHOS 563 06/13/2019     06/13/2019     06/13/2019     Radiology:  Reviewed     Microbiology:   6/10/19- blood cx GNR   6/10/19- urine cx- < 10,000 GPO    ASSESSMENT:  · GNR bacteremia - likely source intra-abdominal  · AST/ALT elevated today   · MRI showing cholangitis   · Immunocompromised patient  · Has history of pancreatic cancer     PLAN:  · Continue zosyn for now  · Cultures not finalized  · Awaiting transfer to [address] [place] [address]  3:44 PM  6/13/2019     Clinically strong suspicion of cholangitis     I have discussed the case, including pertinent history and physical  exam findings . I have seen and examined the patient and the key elements of the encounter have been performed by me. I agree with the assessment, plan and orders as documented.

## 2019-06-13 NOTE — PROGRESS NOTES
HPI:  Unfortunately, Dylon Molina became febrile once again last evening. CT scan of the abdomen is indicating inflammatory changes near the hepaticojejunostomy. He now has elevated liver function studies and we discussed this at length. He is feeling somewhat ill today. No family members were present during my examination. We discussed consultation with the hepatobiliary team.  Patient voiced no new complaints since hospital admission. Questions, answers, and tests reviewed. ROS:  Cardiovascular:   Denies any chest pain, irregular heartbeats, or palpitations. Respiratory:   Denies shortness of breath, coughing, sputum production, hemoptysis, or wheezing. Gastrointestinal:   Abdominal discomfort diffusely. Intermittent periods of nausea. No overt pain. Extremities:   Denies any lower extremity swelling or edema. Neurology:    Denies any headache or focal neurological deficits. Admits to generalized weakness and deconditioning. Derm:    Denies any rashes, ulcers, or excoriations. Denies bruising. Genitourinary:    Denies any urgency, frequency, hematuria. Voiding without difficulty. Physical Exam:    Vitals:    06/13/19 0015   BP:    Pulse:    Resp:    Temp: 98.4 °F (36.9 °C)   SpO2: 98%       HEENT:  PERRLA. EOMI. Sclera clear. Buccal mucosa moist.    Neck:  Supple. Trachea midline. No thyromegaly. No JVD. No bruits. Heart:  Rhythm regular, rate controlled. No murmurs. Lungs:  Symmetrical. Clear to auscultation bilaterally. No wheezes. No rhonchi. No rales. Abdomen: Soft. Non-tender. Non-distended. Bowel sounds positive. No organomegaly or masses. No pain on palpation    Extremities:  Peripheral pulses present. No peripheral edema. No ulcers. Neurologic:  Alert x 3. No focal deficit. Cranial nerves grossly intact. Skin:  No petechia. No hemorrhage. No wounds.     CBC with Differential:    Lab Results   Component Value Date    WBC 5.4 06/13/2019    RBC 3.13 06/13/2019    HGB 9.2 06/13/2019    HCT 30.2 06/13/2019     06/13/2019    MCV 96.5 06/13/2019    MCH 29.4 06/13/2019    MCHC 30.5 06/13/2019    RDW 15.4 06/13/2019    SEGSPCT 85 12/20/2012    LYMPHOPCT 16.5 06/13/2019    MONOPCT 4.3 06/13/2019    MYELOPCT 2.0 11/19/2018    BASOPCT 0.4 06/13/2019    MONOSABS 0.22 06/13/2019    LYMPHSABS 0.92 06/13/2019    EOSABS 0.14 06/13/2019    BASOSABS 0.00 06/13/2019     CMP:    Lab Results   Component Value Date     06/13/2019    K 4.8 06/13/2019     06/13/2019    CO2 25 06/13/2019    BUN 7 06/13/2019    CREATININE 0.7 06/13/2019    GFRAA >60 06/13/2019    LABGLOM >60 06/13/2019    GLUCOSE 93 06/13/2019    PROT 6.6 06/13/2019    LABALBU 3.3 06/13/2019    CALCIUM 8.6 06/13/2019    BILITOT 0.7 06/13/2019    ALKPHOS 563 06/13/2019     06/13/2019     06/13/2019       Other Data:      Intake/Output Summary (Last 24 hours) at 6/13/2019 0808  Last data filed at 6/13/2019 0634  Gross per 24 hour   Intake 3335 ml   Output 0 ml   Net 3335 ml         Scheduled Medications:   vancomycin  1,750 mg Intravenous Q12H    escitalopram  5 mg Oral Daily    pantoprazole  40 mg Oral Daily    lipase-protease-amylase  6 capsule Oral TID     potassium chloride  20 mEq Oral Daily    piperacillin-tazobactam  3.375 g Intravenous Q8H    sodium chloride flush  10 mL Intravenous 2 times per day    apixaban  5 mg Oral BID         Infusion Medications:   0.9% NaCl with KCl 40 mEq 100 mL/hr at 06/12/19 8482       Assessment:   1. Gram-negative bacteremia with inflammatory changes at the hepaticojejunostomy in the setting of previous Whipple with newly elevated liver function studies and concern for an obstructive process  2. Pancreatic adenocarcinoma s/p Whipple procedure having completed chemotherapy recently  3. History of positive blood cultures for E. Faecium 9/25/18  4. OVIDIO not currently using CPAP  5. Anxiety  6.  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  Unfortunately, he became febrile once again yesterday. We have discussed the results of the CT scan and the newly elevated liver function studies. There is concern for an underlying obstructive process and MRCP will be obtained. We will ask Dr. Angelica Melendrez to provide consultation and I will speak with him directly regarding the case. We will continue with IV fluid resuscitation. Chronic comorbidities are being monitored.     Rylie Corea D.O.  8:08 AM  6/13/2019

## 2019-06-13 NOTE — PROGRESS NOTES
Patient was accepted to [facility] H 80 Bed 25. [facility] will transport. Approximant  time of 9:30pm this evening. N-N to be called to [phone].

## 2019-06-13 NOTE — PLAN OF CARE
Problem: Nausea/Vomiting:  Goal: Absence of nausea/vomiting  Description  Absence of nausea/vomiting  Outcome: Met This Shift     Problem: Nausea/Vomiting:  Goal: Able to drink  Description  Able to drink  Outcome: Met This Shift     Problem: Nausea/Vomiting:  Goal: Able to eat  Description  Able to eat  Outcome: Met This Shift     Problem: Pain:  Goal: Pain level will decrease  Description  Pain level will decrease  Outcome: Met This Shift     Problem: Pain:  Goal: Control of chronic pain  Description  Control of chronic pain  Outcome: Met This Shift     Problem: Falls - Risk of:  Goal: Will remain free from falls  Description  Will remain free from falls  Outcome: Met This Shift

## 2019-06-13 NOTE — PLAN OF CARE
Problem: Nausea/Vomiting:  Goal: Absence of nausea/vomiting  Description  Absence of nausea/vomiting  6/13/2019 1205 by Miryam Santiago RN  Outcome: Met This Shift     Problem: Nausea/Vomiting:  Goal: Able to drink  Description  Able to drink  6/13/2019 1205 by Miryam Santiago RN  Outcome: Met This Shift     Problem: Nausea/Vomiting:  Goal: Able to eat  Description  Able to eat  6/13/2019 1205 by Miryam Santiago RN  Outcome: Met This Shift     Problem: Pain:  Goal: Pain level will decrease  Description  Pain level will decrease  6/13/2019 1205 by Miryam Santiago RN  Outcome: Met This Shift     Problem: Pain:  Goal: Control of acute pain  Description  Control of acute pain  6/13/2019 1205 by Miryam Santiago RN  Outcome: Met This Shift     Problem: Pain:  Goal: Control of chronic pain  Description  Control of chronic pain  6/13/2019 1205 by Miryam Santiago RN  Outcome: Met This Shift     Problem: Falls - Risk of:  Goal: Will remain free from falls  Description  Will remain free from falls  6/13/2019 1205 by Miryam Santiago RN  Outcome: Met This Shift     Problem: Pain:  Goal: Control of chronic pain  Description  Control of chronic pain  6/13/2019 1205 by Miryam Santiago RN  Outcome: Met This Shift     Problem: Falls - Risk of:  Goal: Absence of physical injury  Description  Absence of physical injury  6/13/2019 1205 by Miryam Santiago RN  Outcome: Met This Shift

## 2019-06-13 NOTE — PLAN OF CARE
Problem: Nausea/Vomiting:  Goal: Absence of nausea/vomiting  Description  Absence of nausea/vomiting  6/13/2019 1715 by Lisa Davis RN  Outcome: Met This Shift     Problem: Nausea/Vomiting:  Goal: Able to drink  Description  Able to drink  6/13/2019 1715 by Lisa Davis RN  Outcome: Met This Shift     Problem: Nausea/Vomiting:  Goal: Able to eat  Description  Able to eat  6/13/2019 1715 by Lisa Davis RN  Outcome: Met This Shift     Problem: Pain:  Goal: Pain level will decrease  Description  Pain level will decrease  6/13/2019 1715 by Lisa Davis RN  Outcome: Met This Shift     Problem: Pain:  Goal: Control of acute pain  Description  Control of acute pain  6/13/2019 1715 by Lisa Davis RN  Outcome: Met This Shift     Problem: Pain:  Goal: Control of chronic pain  Description  Control of chronic pain  6/13/2019 1715 by Lisa Davis RN  Outcome: Met This Shift     Problem: Falls - Risk of:  Goal: Will remain free from falls  Description  Will remain free from falls  6/13/2019 1715 by Lisa Davis RN  Outcome: Met This Shift     Problem: Falls - Risk of:  Goal: Absence of physical injury  Description  Absence of physical injury  6/13/2019 1715 by Lisa Davis RN  Outcome: Met This Shift

## 2019-06-14 NOTE — DISCHARGE SUMMARY
ORGANS: Partial pancreatectomy changes are noted. The liver is low in density. The spleen is mildly enlarged. Adrenal glands are normal. BILIARY SYSTEM: Hepaticojejunostomy is identified with fat stranding in the adjacent soft tissues. There is pneumobilia. GENITOURINARY: The kidneys are normal in appearance bilaterally with no evidence of hydronephrosis. No stones are appreciated. The bladder is unremarkable. Pelvic structures unremarkable. GASTROINTESTINAL: The stomach, small and large bowel are normal in caliber without evidence of focal wall thickening. There is no evidence of bowel obstruction. APPENDIX: Not visualized. FLUID COLLECTIONS: None. LYMPH NODES: No adenopathy by size criteria. VASCULAR STRUCTURES: Visualized vascular structures appear normal. ABDOMINAL WALL: Normal with no herniations. OSSEOUS AND SOFT TISSUE STRUCTURES: Bone windows demonstrate no suspicious osteolytic or osteoblastic lesions. No fracture identified. 1. Inflammatory changes around the hepaticojejunostomy. Findings likely infectious or inflammatory. 2. Hepatic steatosis and splenomegaly. 3. Post surgical changes from Whipple procedure. Xr Chest Portable    Result Date: 6/9/2019  Location:200 Exam: XR CHEST PORTABLE Comparison:  5/1/2019 History: Pancreatic CA, fever Findings: A single frontal portable view of the chest shows the mediastinum, great vessels and heart to be unremarkable. No acute pulmonary parenchymal opacity. 1.  Unremarkable portable chest.     Mri Abdomen W Wo Contrast Mrcp    Result Date: 6/13/2019  LOCATION: 200 EXAM: MRI ABDOMEN W WO CONTRAST MRCP COMPARISON: 6/12/2019. HISTORY: Elevated LFTs and Whipple. TECHNIQUE: Routine multiplanar multisequence imaging was performed of the abdomen. 18 mL MultiHance administered intravenously. 3-D thick slab images obtained of the biliary system on a separate workstation. FINDINGS: Hepaticojejunostomy is identified.  There is mild dilation of the intrahepatic ducts more focally in the left hepatic lobe. Postcontrast imaging showing multiple ill-defined areas of hypovascularity with peripheral enhancement within the right and left hepatic lobes. There is a large 1.8 cm lesion inferiorly in the right hepatic lobe. A more ill-defined lesion is seen in the left hepatic lobe superiorly measuring 1.3 cm. There is a small cluster of small nonenhancing foci in the superior left hepatic dome with peripheral contrast retention. There is enhancement of the bile duct. Postsurgical changes from partial pancreatectomy with hepaticojejunostomy. No areas of decreased enhancement involving the pancreatic resection margin. No adenopathy seen. The hepatic and portal veins are patent. 1. Multiple hypervascular lesions in the liver are concerning for metastases. 2. Small cluster of nonenhancing lesions in the left hepatic dome with peripheral contrast retention concerning for small distal biliary abscesses. These would not be amenable to percutaneous drainage. 3. Hyperenhancement of the bile ducts suggesting cholangitis. 4. Dilated intrahepatic ducts. To assess for obstruction, a hepatobiliary scan would be required. HOSPITAL COURSE:   [facility] did well throughout the hospital stay. In the setting of fever of unknown origin, he was started empirically with IV antibiotics and the infectious disease team provided consultations. As blood cultures returned positive, we did move forward with CT scan of the abdomen and pelvis. These results indicated inflammation at the hepaticojejunostomy site. MRI then confirmed potential microabscesses of the distal aspect of the biliary tree. His liver enzymes have been trending upward, there was concern for early cholangitis. I discussed this at length with the patient who requested transfer to [facility]. This was arranged accordingly and the patient was transferred there without complication. His family was updated at the bedside. tablet  Take 5 mg by mouth daily             ibuprofen (ADVIL;MOTRIN) 400 MG tablet  Take 1 tablet by mouth every 6 hours as needed for Fever             loperamide (IMODIUM) 2 MG capsule  Take 2 mg by mouth 4 times daily as needed for Diarrhea             Pancrelipase, Lip-Prot-Amyl, (CREON) 51261 units CPEP  Take 2 capsules by mouth 3 times daily (with meals)              Pancrelipase, Lip-Prot-Amyl, (CREON) 69236 units CPEP  Take 1 capsule by mouth daily as needed (with Snack)             pantoprazole (PROTONIX) 40 MG tablet  Take 40 mg by mouth daily             piperacillin-tazobactam (ZOSYN) 3.375 (3-0.375) g injection  Inject 3.375 g into the muscle every 6 hours             potassium chloride (KLOR-CON M) 20 MEQ extended release tablet  Take 20 mEq by mouth daily             promethazine (PHENERGAN) 25 MG tablet  Take 25 mg by mouth every 6 hours as needed for Nausea             senna-docusate (PERICOLACE) 8.6-50 MG per tablet  Take 1 tablet by mouth daily as needed for Constipation                  FOLLOW UP/INSTRUCTIONS:  · This patient is instructed to follow-up with his primary care physician. · Patient is instructed to follow-up with the consults listed above as directed by them. · he is instructed to resume home medications and take new medications as indicated in the list above. · If the patient has a recurrence of symptoms, he is instructed to go to the ED. Preparing for this patient's discharge, including paperwork, orders, instructions, and meeting with patient did require > 30 minutes.     Patricia Wolf DO     6/14/2019  1:35 PM

## 2019-06-28 NOTE — ED PROVIDER NOTES
- 16 mmol/L    Glucose 90 74 - 99 mg/dL    BUN 10 6 - 20 mg/dL    CREATININE 0.8 0.7 - 1.2 mg/dL    GFR Non-African American >60 >=60 mL/min/1.73    GFR African American >60     Calcium 8.8 8.6 - 10.2 mg/dL    Total Protein 7.4 6.4 - 8.3 g/dL    Alb 3.8 3.5 - 5.2 g/dL    Total Bilirubin 1.0 0.0 - 1.2 mg/dL    Alkaline Phosphatase 203 (H) 40 - 129 U/L    ALT 34 0 - 40 U/L    AST 31 0 - 39 U/L   Lipase   Result Value Ref Range    Lipase 8 (L) 13 - 60 U/L   Lactic Acid, Plasma   Result Value Ref Range    Lactic Acid 1.1 0.5 - 2.2 mmol/L   EKG 12 Lead   Result Value Ref Range    Ventricular Rate 86 BPM    Atrial Rate 86 BPM    P-R Interval 146 ms    QRS Duration 86 ms    Q-T Interval 364 ms    QTc Calculation (Bazett) 435 ms    P Axis 49 degrees    R Axis 24 degrees    T Axis 30 degrees       Radiology:  XR CHEST PORTABLE   Final Result   No acute cardiopulmonary disease.                   ------------------------- NURSING NOTES AND VITALS REVIEWED ---------------------------  Date / Time Roomed:  6/28/2019  5:41 PM  ED Bed Assignment:  06/06    The nursing notes within the ED encounter and vital signs as below have been reviewed. /64   Pulse 88   Temp 99.2 °F (37.3 °C) (Oral)   Resp 18   Wt 181 lb 4 oz (82.2 kg)   SpO2 99%   BMI 26.01 kg/m²   Oxygen Saturation Interpretation: Normal      ------------------------------------------ PROGRESS NOTES ------------------------------------------    2310  Spoke with Dr. Marilee Verma for  [facility] (heme/onc). Discussed case. He recommended that the patient be brought in for admission until blood cultures could be resulted. He recommended IV antibiotics. 2319  I discussed this conversation with the agent as well. He has been started on a dose of cefepime which was found to be sensitive to his most recent cultures.   He states he will think about whether or not he wants to stay or if he is going to go.    2329  Patient states that he has decided to leave the

## 2019-07-18 NOTE — ED NOTES
IV antibiotics completed. Patient verbalized understanding of discharge instructions and prescriptions and will follow up with PCP/oncology as needed or return to ED if symptoms worsen.      Cindy Camarena RN  07/18/19 0630

## 2019-07-18 NOTE — ED PROVIDER NOTES
Regular rhythm. Tachycardia present. No murmur heard. Pulmonary/Chest: Effort normal and breath sounds normal. No stridor. No respiratory distress. Clear breath sounds in all lung fields. No acute respiratory stress. Abdominal: Soft. Bowel sounds are normal. He exhibits no distension. There is no tenderness. Musculoskeletal: Normal range of motion. He exhibits no edema or tenderness. Lymphadenopathy:     He has no cervical adenopathy. Neurological: He is alert and oriented to person, place, and time. No cranial nerve deficit. Coordination normal.   Patient is awake, alert, oriented x3. No focal neurological deficits. Skin: Skin is warm. Capillary refill takes less than 2 seconds. He is not diaphoretic. No erythema. No pallor. Psychiatric: He has a normal mood and affect. His behavior is normal.       Procedures    [facility]            --------------------------------------------- PAST HISTORY ---------------------------------------------  Past Medical History:  has a past medical history of Anticoagulant long-term use, Anxiety, Back pain, Cancer ([facility] [address] DDD (degenerative disc disease), lumbar, DVT, Hx of blood clots, Neutropenic fever ([facility] [address] Panic attacks, Sleep apnea, and Unspecified sleep apnea. Past Surgical History:  has a past surgical history that includes Knee arthroscopy (1987); Rotator cuff repair (Right); Nerve Block (11/25/13); Nerve Block (Right, 12/011/13); Nerve Block (Right, 12/18/13); Nerve Block (02/07/14); Pancreas surgery; pr echo transesophag r-t 2d img acquisj i&r only (N/A, 11/16/2018); shoulder surgery (Left); Patella surgery (Right, 1987); and Colonoscopy. Social History:  reports that he quit smoking about 11 years ago. He has a 20.00 pack-year smoking history. He has never used smokeless tobacco. He reports that he does not drink alcohol or use drugs.     Family History: family history includes COPD in his father; Cancer in his brother and father; Diabetes in his father; Plasma   Result Value Ref Range    Lactic Acid 2.5 (H) 0.5 - 2.2 mmol/L   Urinalysis   Result Value Ref Range    Color, UA Yellow Straw/Yellow    Clarity, UA Clear Clear    Glucose, Ur Negative Negative mg/dL    Bilirubin Urine Negative Negative    Ketones, Urine Negative Negative mg/dL    Specific Gravity, UA 1.015 1.005 - 1.030    Blood, Urine Negative Negative    pH, UA 8.0 5.0 - 9.0    Protein, UA Negative Negative mg/dL    Urobilinogen, Urine 1.0 <2.0 E.U./dL    Nitrite, Urine Negative Negative    Leukocyte Esterase, Urine Negative Negative   EKG 12 Lead   Result Value Ref Range    Ventricular Rate 0 BPM    Atrial Rate 0 BPM    QRS Duration 0 ms    Q-T Interval 0 ms    QTc Calculation (Bazett) 0 ms    R Axis 0 degrees    T Axis 0 degrees       Radiology:  XR CHEST PORTABLE   Final Result   No acute cardiopulmonary disease.                      ------------------------- NURSING NOTES AND VITALS REVIEWED ---------------------------  Date / Time Roomed:  7/18/2019  9:38 AM  ED Bed Assignment:  04/04    The nursing notes within the ED encounter and vital signs as below have been reviewed. BP (!) 97/57   Pulse 95   Temp 100 °F (37.8 °C) (Oral)   Resp 14   Ht 5' 10\" (1.778 m)   Wt 190 lb (86.2 kg)   SpO2 96%   BMI 27.26 kg/m²   Oxygen Saturation Interpretation: Normal    EKG: This EKG is signed and interpreted by me. Rate: 98  Rhythm: Sinus  Interpretation: Sinus rhythm, no acute ischemic changes. Comparison: No acute ischemic changes from comparison EKG on 6/28/2019.    ------------------------------------------ PROGRESS NOTES ------------------------------------------  Time: 12:08. Spoke with . Discussed case. She recommends admission due to high fever. Time: 12:14. Discussed the results with the patient. Patient does not want to be admitted at this time. He understands the risk associated with this. He states he has had enough of hospitals and does not want to be admitted.     Time:
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
## 2019-07-20 NOTE — CONSULTS
fever,    EYES:     No blurry vision, loss of vision,eye pain  ENT:      No hearing loss, sore throat  CARDIOVASCULAR:  No chest pain or palpitations  RESPIRATORY:   No cough, sob  ENDOCRINE:    No increase thirst, urination   HEME-LYMPH:   No easy bruising or bleeding, h/o pancreatic/ liver cancer   GI:     No nausea, vomiting or diarrhea  :     No urinary complaints  NEURO:    No seizures, stroke, HA  MUSCULOSKELETAL:  degenerative back disease   SKIN:     No rash or itch  PSYCH:    No depression , ++ anxiety    CURRENT MEDICATIONS     Current Facility-Administered Medications:     apixaban (ELIQUIS) tablet 5 mg, 5 mg, Oral, BID, Laverta Kudo, DO, 5 mg at 07/20/19 0911    diphenoxylate-atropine (LOMOTIL) 2.5-0.025 MG per tablet 1 tablet, 1 tablet, Oral, 4x Daily PRN, Laverta Kudo, DO    doxycycline hyclate (VIBRAMYCIN) capsule 100 mg, 100 mg, Oral, BID, Laverta Kudo, DO, 100 mg at 07/20/19 8088    escitalopram (LEXAPRO) tablet 5 mg, 5 mg, Oral, Daily, Laverta Kudo, DO, 5 mg at 07/20/19 4825    ferrous sulfate tablet 325 mg, 325 mg, Oral, Lunch, Laverta Kudo, DO, 325 mg at 07/20/19 1103    levofloxacin (LEVAQUIN) tablet 500 mg, 500 mg, Oral, Daily, Laverta Kudo, DO, 500 mg at 07/20/19 1402    LORazepam (ATIVAN) tablet 0.5 mg, 0.5 mg, Oral, Q8H PRN, Laverta Kudo, DO, 0.5 mg at 07/20/19 1130    pantoprazole (PROTONIX) tablet 40 mg, 40 mg, Oral, Daily, Laverta Kudo, DO, 40 mg at 07/20/19 3367    promethazine (PHENERGAN) tablet 25 mg, 25 mg, Oral, Q6H PRN, Laverta Kudo, DO    sennosides-docusate sodium (SENOKOT-S) 8.6-50 MG tablet 1 tablet, 1 tablet, Oral, Daily PRN, Laverta Rauldo, DO    acetaminophen (TYLENOL) tablet 650 mg, 650 mg, Oral, Q4H PRN, Laverta Raludo, DO, 650 mg at 07/20/19 0940    perflutren lipid microspheres (DEFINITY) injection 1.65 mg, 1.5 mL, Intravenous, ONCE PRN, Mela Anand DO    zolpidem Other Sister         blood clots    Cancer Brother         liver cancer  at 47     SOCIAL HISTORY       Social History     Socioeconomic History    Marital status:      Spouse name: Not on file    Number of children: 2    Years of education: 10    Highest education level: Not on file   Occupational History    Occupation:      Employer: [address] [place] [address] [employer] resource strain: Not very hard    Food insecurity:     Worry: Sometimes true     Inability: Sometimes true    Transportation needs:     Medical: No     Non-medical: No   Tobacco Use    Smoking status: Former Smoker     Packs/day: 1.00     Years: 20.00     Pack years: 20.00     Last attempt to quit: 2008     Years since quittin.1    Smokeless tobacco: Never Used   Substance and Sexual Activity    Alcohol use: No    Drug use: No    Sexual activity: Not Currently   Lifestyle    Physical activity:     Days per week: 6 days     Minutes per session: 20 min    Stress:  Only a little   Relationships    Social connections:     Talks on phone: More than three times a week     Gets together: More than three times a week     Attends Shinto service: More than 4 times per year     Active member of club or organization: No     Attends meetings of clubs or organizations: Never     Relationship status:     Intimate partner violence:     Fear of current or ex partner: No     Emotionally abused: No     Physically abused: No     Forced sexual activity: No   Other Topics Concern    Not on file   Social History Narrative    Not on file     · Lives at home with mother and daughter  · No animals    PHYSICAL EXAM    (up to 7 for level 4, 8 or more forlevel 5)     ED Triage Vitals   BP Temp Temp Source Pulse Resp SpO2 Height Weight   19 1504 19 1504 19 1504 19 1504 19 1504 19 2130 19 1504 19 1500   115/60 100.1 °F (37.8 °C) Oral 100 22 94 % 5' 10\" (1.778 m)

## 2019-07-20 NOTE — PROGRESS NOTES
Genitalia/Breast:  Deferred      Allergy:  Allergies   Allergen Reactions    Rivaroxaban      Other reaction(s): Myalgias (muscle pain)        Medication:  Scheduled Meds:   apixaban  5 mg Oral BID    doxycycline hyclate  100 mg Oral BID    escitalopram  5 mg Oral Daily    ferrous sulfate  325 mg Oral Lunch    levofloxacin  500 mg Oral Daily    pantoprazole  40 mg Oral Daily    lipase-protease-amylase  6 capsule Oral TID WC    cefepime  2 g Intravenous Q12H     Continuous Infusions:    Objective Data:  CBC:   Recent Labs     07/18/19  1040 07/20/19  0549   WBC 4.5 3.7*   HGB 10.7* 9.7*    181     BMP:    Recent Labs     07/18/19  1040 07/20/19  0549    137   K 4.3 4.3    106   CO2 24 23   BUN 11 9   CREATININE 0.8 0.6*   GLUCOSE 111* 89     CMP:    Lab Results   Component Value Date     07/20/2019    K 4.3 07/20/2019    K 4.6 06/28/2019     07/20/2019    CO2 23 07/20/2019    BUN 9 07/20/2019    CREATININE 0.6 07/20/2019    GFRAA >60 07/20/2019    LABGLOM >60 07/20/2019    GLUCOSE 89 07/20/2019    PROT 6.2 07/20/2019    LABALBU 3.0 07/20/2019    CALCIUM 8.4 07/20/2019    BILITOT 0.8 07/20/2019    ALKPHOS 123 07/20/2019    AST 18 07/20/2019    ALT 24 07/20/2019     Hepatic:   Recent Labs     07/18/19  1040 07/20/19  0549   AST 42* 18   ALT 41* 24   BILITOT 0.7 0.8   ALKPHOS 167* 123     Troponin:   Recent Labs     07/18/19  1040   TROPONINI <0.01     BNP: No results for input(s): BNP in the last 72 hours. Lipids: No results for input(s): CHOL, HDL in the last 72 hours. Invalid input(s): LDLCALCU  ABGs: No results found for: PHART, PO2ART, ZOF6QLD  INR: No results for input(s): INR, PROTIME in the last 72 hours. RAD: No results found.         Chronic Hospital Medical Problems:  Past Medical History:   Diagnosis Date    Anticoagulant long-term use     Anxiety     Back pain     Cancer ([address] 08/2018    pancreatic cancer    DDD (degenerative disc disease), lumbar

## 2019-07-21 NOTE — PROGRESS NOTES
[facility]    Laboratory and Tests Review:  Lab Results   Component Value Date    WBC 2.1 (L) 07/21/2019    WBC 3.7 (L) 07/20/2019    WBC 4.5 07/18/2019    HGB 10.1 (L) 07/21/2019    HCT 32.1 (L) 07/21/2019    MCV 93.6 07/21/2019     07/21/2019     Lab Results   Component Value Date    NEUTROABS 1.68 (L) 07/21/2019    NEUTROABS 3.40 07/20/2019    NEUTROABS 4.23 07/18/2019     Lab Results   Component Value Date    CRP 15.7 (H) 07/19/2019    CRP 9.7 (H) 05/02/2019     Lab Results   Component Value Date    SEDRATE 85 (H) 07/19/2019    SEDRATE 55 (H) 05/02/2019     Lab Results   Component Value Date    ALT 24 07/20/2019    AST 18 07/20/2019    ALKPHOS 123 07/20/2019    BILITOT 0.8 07/20/2019     Lab Results   Component Value Date     07/21/2019    K 4.1 07/21/2019    K 4.6 06/28/2019     07/21/2019    CO2 23 07/21/2019    BUN 9 07/21/2019    CREATININE 0.7 07/21/2019    GFRAA >60 07/21/2019    LABGLOM >60 07/21/2019    GLUCOSE 98 07/21/2019    PROT 6.2 07/20/2019    LABALBU 3.0 07/20/2019    CALCIUM 8.7 07/21/2019    BILITOT 0.8 07/20/2019    ALKPHOS 123 07/20/2019    AST 18 07/20/2019    ALT 24 07/20/2019     Radiology:  Reviewed from prior admission     Microbiology:   7/18/19- blood cx- E.coli- Pan sensitive   7/19/19-blood cx- no growth to date    ASSESSMENT:  · E.coli bacteremia   · Fever/ chills   · Klebsiella bacteremia- June 2019  · H/o e.  Faecium bacteremia 9/2018  · History of pancreatic Cancer/ mets to liver Cancer  · Immunocompromised patient     PLAN:  · Continue levaquin - will increase dose  · Stop cefepime and doxycycline   · Check cultures  · Monitor labs  · Will leave [facility]- now with E.coli bacteremia   · Check urine culture-   · For probable d/c tomorrow     Arjun GARCIA- BC  11:36 AM  7/21/2019     Recent examined, clinically doing better, patient with E. coli bacteremia looking at his previous culture report he had a Klebsiella bacteremia June 2019 and

## 2019-07-21 NOTE — PROGRESS NOTES
Internal Medicine Progress Note    [facility]. Scarlett Elizondo., & [address] Scarlett Elizondo., F.A.C.O.I. Frederick Escobar D.O., F.KEISHA.C.O.I. Primary Care Physician: Deirdre Rubio DO   Admitting Physician:  Deirdre Rubio DO  Admission date and time: 7/19/2019  2:42 PM    Room:  [address] [place] [address]  Admitting diagnosis: Sepsis [facility]) [A41.9]    Patient Name: Christine Toney  MRN: [id number]    Date of Service: 7/21/2019     Subjective:    Nella is a 63 y. o.  male who was seen and examined today,7/21/2019, at the bedside. Feels better today. Up walking in the hallways. No fever or chills. No family present during my examination. Review of System:   Constitutional:   Denies fever or chills, weight loss or gain, fatigue or malaise. HEENT:   Denies ear pain, sore throat, sinus or eye problems. Cardiovascular:   Denies any chest pain, irregular heartbeats, or palpitations. Respiratory:   Denies shortness of breath, coughing, sputum production, hemoptysis, or wheezing. Gastrointestinal:   Denies nausea, vomiting, diarrhea, or constipation. Denies any abdominal pain. Genitourinary:    Denies any urgency, frequency, hematuria. Voiding  without difficulty. Extremities:   Denies lower extremity swelling, edema or cyanosis. Neurology:    Denies any or focal neurological deficits, Denies generalized weakness or memory difficulty. Headache  Psch: Admits to being anxious or depressed. Musculoskeletal:    Denies  myalgias, joint complaints or back pain. Integumentary:   Denies any rashes, ulcers, or excoriations. Denies bruising. Hematologic/Lymphatic:  Denies bruising or bleeding. Physical Exam:  No intake/output data recorded. Intake/Output Summary (Last 24 hours) at 7/21/2019 0938  Last data filed at 7/21/2019 4961  Gross per 24 hour   Intake 1030 ml   Output 0 ml   Net 1030 ml   I/O last 3 completed shifts:   In: 26 [P.O.:1270]  Out: 0   Patient Vitals for the past 96 hrs (Last 3 readings):   Weight   07/19/19 1504 191 lb 9.6 oz (86.9 kg)   07/19/19 1500 190 lb (86.2 kg)       Vital Signs:   Blood pressure 111/62, pulse 90, temperature 98.2 °F (36.8 °C), temperature source Oral, resp. rate 19, height 5' 10\" (1.778 m), weight 191 lb 9.6 oz (86.9 kg), SpO2 98 %. Connie Humphreys is a 63 y. o.  male who is alert, responsive, oriented to person, place, and time. General appearance:   Well preserved, alert, no distress. Head:  Normocephalic. No masses, lesions or tenderness. Eyes:  PERRLA. EOMI. Sclera clear. Impaired vision. ENT:  Ears normal. Mucosa normal.  Neck:    Supple. Trachea midline. No thyromegaly. No JVD. No bruits. Heart:    Rhythm regular. Rate controlled. No murmurs. Lungs:    Symmetrical. Clear to auscultation bilaterally. No wheezes. No rhonchi. No rales. Abdomen:   Soft. Non-tender. Non-distended. Bowel sounds positive. No organomegaly or masses. No pain on palpation. Extremities:    Peripheral pulses present. No peripheral edema. No ulcers. No cyanosis. No clubbing. Neurologic:    Alert x 3. No focal deficit. Cranial nerves grossly intact. No focal weakness. Psych:   Behavior is normal. Mood appears normal. Speech is not rapid and/or pressured. Musculoskeletal:   Spine ROM normal. Muscular strength intact. Gait not assessed. Integumentary:  No rashes  Skin normal color and texture. Right chest mediport -accessed.    Genitalia/Breast:  Deferred      Allergy:  Allergies   Allergen Reactions    Rivaroxaban      Other reaction(s): Myalgias (muscle pain)        Medication:  Scheduled Meds:   apixaban  5 mg Oral BID    doxycycline hyclate  100 mg Oral BID    escitalopram  5 mg Oral Daily    ferrous sulfate  325 mg Oral Lunch    levofloxacin  500 mg Oral Daily    pantoprazole  40 mg Oral Daily    lipase-protease-amylase  6 capsule Oral TID     cefepime  2 g Intravenous Q12H     Continuous Infusions:    Objective Data:  CBC:   Recent

## 2019-07-22 NOTE — PROGRESS NOTES
dry. No rashes were noted. HEENT:   AT/NC   Neck:    Supple to movements. No lymphadenopathy. Chest:   No use of accessory muscles to breathe. Symmetrical expansion. Auscultation reveals no wheezing, crackles, or rhonchi. Cardiovascular:  S1 and S2 are rhythmic and regular. No murmurs appreciated. Abdomen:   Positive bowel sounds to auscultation. Benign to palpation. Extremities:   No clubbing, no cyanosis, no edema. CNS:    Awake and alert  Psych:   Pleasant  [facility]    I & O - 24hr:    Intake/Output Summary (Last 24 hours) at 7/22/2019 0839  Last data filed at 7/21/2019 1458  Gross per 24 hour   Intake 780 ml   Output 0 ml   Net 780 ml     No intake/output data recorded. Weight change:   LABS:    Recent Labs     07/20/19  0549 07/21/19  0604 07/22/19 0519   WBC 3.7* 2.1* 1.3*   HGB 9.7* 10.1* 9.4*   HCT 30.4* 32.1* 30.2*   MCV 92.1 93.6 92.6    230 197     Recent Labs     07/20/19  0549 07/21/19  0604 07/22/19 0519    138 141   K 4.3 4.1 4.1    105 107   CO2 23 23 24   BUN 9 9 10   CREATININE 0.6* 0.7 0.7   GFRAA >60 >60 >60   LABGLOM >60 >60 >60   GLUCOSE 89 98 102*   PROT 6.2*  --   --    LABALBU 3.0*  --   --    CALCIUM 8.4* 8.7 8.7   BILITOT 0.8  --   --    ALKPHOS 123  --   --    AST 18  --   --    ALT 24  --   --      No results for input(s): PROCAL in the last 72 hours. Lab Results   Component Value Date    SEDRATE 85 (H) 07/19/2019    SEDRATE 55 (H) 05/02/2019     Lab Results   Component Value Date    CRP 15.7 (H) 07/19/2019    CRP 9.7 (H) 05/02/2019     No results for input(s): ASO in the last 72 hours. No results for input(s): CPK in the last 72 hours. No results for input(s): VANCOTROUGH in the last 72 hours. No results for input(s): VANCORANDOM in the last 72 hours. MISC:  Invalid input(s): TEST CODE  No results for input(s): TESTSENT in the last 72 hours.     MICROBIOLOGY:   Recent Labs     07/19/19  1815   BC 24 Hours- no growth     Recent Labs

## 2019-08-05 NOTE — ED PROVIDER NOTES
Patient is a 60-year-old male who presents with a chief complaint of generalized fatigue, fever, and cough. The patient has a history of stage IV liver cancer and is currently receiving chemotherapy. His last chemotherapy treatment was days ago. Patient receives chemotherapy every 2 weeks. He was recently admitted and discharged with E. coli bacteremia. Since discharge the patient has been on doxycycline and ciprofloxacin for 1 week following his chemotherapy sessions. The patient states that he is currently on these antibiotics and has not missed a dose. States that when he woke up this morning he was not feeling very well and started having fatigue and had a fever of 100.4. Throughout the day it then spiked to 103. He does admit to a nonproductive cough that has been present for the past couple of days. He denies any lightheadedness, dizziness, diaphoresis, chest pain, as of breath, abdominal pain, nausea, vomiting, dysuria, hematuria, diarrhea, constipation. The history is provided by the patient. No  was used. Review of Systems   Constitutional: Positive for fatigue and fever. Negative for chills. HENT: Negative for congestion, rhinorrhea, sinus pressure, sinus pain, sneezing and sore throat. Eyes: Negative for photophobia and visual disturbance. Respiratory: Positive for cough. Negative for shortness of breath and wheezing. Cardiovascular: Negative for chest pain and palpitations. Gastrointestinal: Positive for nausea and vomiting. Negative for abdominal pain, constipation and diarrhea. Genitourinary: Negative for dysuria, frequency and hematuria. Musculoskeletal: Positive for myalgias. Negative for back pain, neck pain and neck stiffness. Skin: Negative for rash and wound. Neurological: Positive for light-headedness. Negative for dizziness and headaches. Psychiatric/Behavioral: Negative for agitation, behavioral problems and confusion. mg/dL    GFR Non-African American >60 >=60 mL/min/1.73    GFR African American >60     Calcium 8.4 (L) 8.6 - 10.2 mg/dL    Total Protein 6.5 6.4 - 8.3 g/dL    Alb 3.4 (L) 3.5 - 5.2 g/dL    Total Bilirubin 0.7 0.0 - 1.2 mg/dL    Alkaline Phosphatase 370 (H) 40 - 129 U/L     (H) 0 - 40 U/L     (H) 0 - 39 U/L   Lipase   Result Value Ref Range    Lipase 7 (L) 13 - 60 U/L   Lactic Acid, Plasma   Result Value Ref Range    Lactic Acid 1.0 0.5 - 2.2 mmol/L   Urinalysis   Result Value Ref Range    Color, UA Yellow Straw/Yellow    Clarity, UA Clear Clear    Glucose, Ur Negative Negative mg/dL    Bilirubin Urine Negative Negative    Ketones, Urine Negative Negative mg/dL    Specific Gravity, UA 1.015 1.005 - 1.030    Blood, Urine Negative Negative    pH, UA 8.5 5.0 - 9.0    Protein, UA TRACE Negative mg/dL    Urobilinogen, Urine 1.0 <2.0 E.U./dL    Nitrite, Urine Negative Negative    Leukocyte Esterase, Urine Negative Negative   Microscopic Urinalysis   Result Value Ref Range    WBC, UA 0-1 0 - 5 /HPF    RBC, UA 0-1 0 - 2 /HPF    Epi Cells NONE /HPF    Bacteria, UA NONE /HPF   EKG 12 Lead   Result Value Ref Range    Ventricular Rate 100 BPM    Atrial Rate 100 BPM    P-R Interval 128 ms    QRS Duration 80 ms    Q-T Interval 324 ms    QTc Calculation (Bazett) 417 ms    P Axis 40 degrees    R Axis 31 degrees    T Axis 28 degrees       RADIOLOGY:  Xr Chest Portable    Result Date: 8/5/2019  Reading location: 200 Indication: Fever, chemotherapy Comparison: Prior chest radiograph from 7/18/2019 Technique: Portable AP upright chest radiograph was obtained. Findings: A right internal jugular port catheter tip terminates at the superior vena cava. The cardiomediastinal silhouette is stable in size and contours. Bilateral lungs and costophrenic angles are clear. There is no evidence of pneumothorax. No acute cardiopulmonary disease.      Xr Chest Portable    Result Date: 7/18/2019  Reading location: 200 Indication:
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
## 2019-08-06 NOTE — H&P
Historical Provider, MD   Pancrelipase, Lip-Prot-Amyl, (CREON) 94323 units CPEP Take 2 capsules by mouth 3 times daily (with meals)     Historical Provider, MD   loperamide (IMODIUM) 2 MG capsule Take 2 mg by mouth 4 times daily as needed for Diarrhea    Historical Provider, MD       REVIEW OF SYSTEMS:  General ROS: positive for  - fatigue and fever  Hematological and Lymphatic ROS: negative  Respiratory ROS: negative  Cardiovascular ROS: negative  Gastrointestinal ROS: negative  Genito-Urinary ROS: negative  Musculoskeletal ROS: negative  ENT ROS: negative  Endocrine ROS: negative  Dermatological ROS: negative    PHYSICAL EXAM:  Vitals:    08/05/19 1923   BP: 103/65   Pulse: 98   Resp:    Temp: 101.2 °F (38.4 °C)   SpO2:        General Appearance:  awake, alert, oriented, in no acute distress  Skin:  Skin color, texture, turgor normal. No rashes or lesions. Head/face:  NCAT  Eyes:  No gross abnormalities. and PERRL  Lungs:  Normal expansion. Clear to auscultation. No rales, rhonchi, or wheezing., No chest wall tenderness. PowerPort right upper chest without erythema or swelling. Heart:  Heart sounds are normal.  Regular rate and rhythm without murmur, gallop or rub.   Abdomen:  NT, S, no guarding  Extremities: no edema, non tender  Neurologic: CNs grossly intact, no slurred speech, A&Ox3  Musculoskeletal: good ROM, no acute deformities        CBC:   Lab Results   Component Value Date    WBC 2.1 08/05/2019    RBC 3.41 08/05/2019    HGB 9.9 08/05/2019    HCT 32.0 08/05/2019    MCV 93.8 08/05/2019    MCH 29.0 08/05/2019    MCHC 30.9 08/05/2019    RDW 18.9 08/05/2019     08/05/2019    MPV 9.8 08/05/2019     BMP:   Lab Results   Component Value Date     08/05/2019    K 4.6 08/05/2019    K 4.6 06/28/2019     08/05/2019    CO2 22 08/05/2019    BUN 11 08/05/2019    LABALBU 3.4 08/05/2019    CREATININE 0.7 08/05/2019    CALCIUM 8.4 08/05/2019    GFRAA >60 08/05/2019    LABGLOM >60 08/05/2019

## 2019-08-07 NOTE — CONSULTS
GI CONSULT NOTE    [facility] Dr. Uri Diehl M.D., Dr. Abimael Rouse D.O., Dr. Tank Wolff M.D., Dr. Lu Gallardo D.O., GI fellow    Requesting physician:Dr. Rachel Martinez  Reason for Consult: recurrent infection s/p whipple for adenocarcinoma  Date:9:43 AM 8/7/2019    Deonte Robb  63 y.o.  male    HPI:  The patient is a 63 y.o. male presents with fever and fatigue that lasted 2 days. Symptoms started today. He is on chemotherapy every other week. He received his last treatment this past Wednesday. He is on prophylactic doxycycline after completing a course of Levaquin that was started from his last discharge. He admits to checking his temperature at home and having a temperature of 99 and then a fever of 103. He did take Tylenol without relief. He was brought to the emergency department by EMS, due to feeling very fatigued. He denies any sick contacts, chest pain, abdominal pain, emesis, worsening diarrhea, sick contacts. He has been tolerating his diet. His family is at the bedside and all questions were answered. Pt with a hx of cholangitis complicated by pancreatic adenocarcinoma s/p whipple at [facility] complicated by cholangitis, pt has also been on Augmentin in the past. He has had a total of ~4 bouts of these episodes over the past couple of months. US on this admission coorborate findings of MRCP on 6/19. Pt with pneumobilia. Labs were reviewed.        PAST MEDICAL Hx:  Past Medical History:   Diagnosis Date    Anticoagulant long-term use     Anxiety     Back pain     Cancer ([facility] [address] 08/2018    pancreatic cancer    DDD (degenerative disc disease), lumbar 10/21/2013    DVT     Hx of blood clots     Neutropenic fever ([facility] [address] 11/13/2018    Panic attacks     Sleep apnea     Unspecified sleep apnea        PAST SURGICAL Hx:   Past Surgical History:   Procedure Laterality Date    COLONOSCOPY      KNEE ARTHROSCOPY  1987    Attends meetings of clubs or organizations: Never     Relationship status:     Intimate partner violence:     Fear of current or ex partner: No     Emotionally abused: No     Physically abused: No     Forced sexual activity: No   Other Topics Concern    Not on file   Social History Narrative    Not on file       PE:  /72   Pulse 70   Temp 98.5 °F (36.9 °C) (Oral)   Resp 16   Ht 5' 10\" (1.778 m)   Wt 190 lb 14.4 oz (86.6 kg)   SpO2 95%   BMI 27.39 kg/m²     General: A&Ox 3, friendly, NAD  HEENT: Atraumatic, symmetric, no anterior/posterior lymphadenopathy, moist mucous membranes, PERRL, EOM intact, fair dentition. Pulm: CTAB, Neg w/r/r, normal chest expansion, no crackles noted  Cardio: RRR, neg m/r/g, nl S1 and S2, no extra heart sounds  Abd.: soft, NT, ND, BS+, no G/R, no HSM  Ext: +2/4 pulse Dp and radial b/l, LE and UE ROM intact,   Skin: No lesions, excoriations, petechiae, or ecchymoses noted    Neuro: normal sensation throughout, DTRs patellar, tricept, and bicept 2/4 b/l and equal, normal muscle strength throughout. DATA:     Lab Results   Component Value Date    WBC 2.4 08/07/2019    RBC 3.07 08/07/2019    HGB 9.0 08/07/2019    HCT 28.8 08/07/2019    MCV 93.8 08/07/2019    MCH 29.3 08/07/2019    MCHC 31.3 08/07/2019    RDW 18.6 08/07/2019     08/07/2019    MPV 9.7 08/07/2019     Lab Results   Component Value Date     08/07/2019    K 3.8 08/07/2019    K 4.6 06/28/2019     08/07/2019    CO2 25 08/07/2019    BUN 11 08/07/2019    CREATININE 0.7 08/07/2019    CALCIUM 8.4 08/07/2019    PROT 6.1 08/07/2019    LABALBU 3.2 08/07/2019    BILITOT 0.3 08/07/2019    ALKPHOS 305 08/07/2019    AST 36 08/07/2019    ALT 75 08/07/2019     Lab Results   Component Value Date    LIPASE 7 08/05/2019     Lab Results   Component Value Date    AMYLASE 140 05/19/2018         ASSESSMENT/PLAN:  1.  Pneumobilia with recurrent febrile illness  Etiology is broad, indwelling mediport is

## 2019-08-08 NOTE — PLAN OF CARE
Problem: Physical Regulation:  Goal: Ability to maintain a body temperature in the normal range will improve  Description  Ability to maintain a body temperature in the normal range will improve  Outcome: Met This Shift     Problem: Falls - Risk of:  Goal: Will remain free from falls  Description  Will remain free from falls  Outcome: Met This Shift  Goal: Absence of physical injury  Description  Absence of physical injury  Outcome: Met This Shift

## 2019-08-08 NOTE — PROGRESS NOTES
8/8/2019 10:21 AM   [facility]     Subjective:     Ida Cole is a 81-year-old gentleman patient of Dr. Neelima Marshall from the [facility] AT [facility] stage IIb pancreatic cancer status post Whipple September 2018. He had completed 12 cycles of FOLFIRINOX May 2019. Evidence of metastatic disease to liver June 2019 biopsy-proven. He started Gemzar and Abraxane in June. He had cycle 2-day 1 July 31, 2019. Patient admitted with fever and fatigue  And a recurrent sepsis with E. Coli  Currently on therapy with Maxipime    Awake and alert no distress no significant pain issues  Afebrile last 24 hours    Current meds:  vancomycin 1000 mg IVPB in 250 mL D5W addavial Q8H   apixaban (ELIQUIS) tablet 5 mg BID   diphenoxylate-atropine (LOMOTIL) 2.5-0.025 MG per tablet 1 tablet 4x Daily PRN   escitalopram (LEXAPRO) tablet 5 mg Daily   ferrous sulfate tablet 325 mg Daily   loperamide (IMODIUM) capsule 2 mg 4x Daily PRN   LORazepam (ATIVAN) tablet 0.5 mg Q8H PRN   lipase-protease-amylase (CREON) 32109 units delayed release capsule 6 capsule TID WC   pantoprazole (PROTONIX) tablet 40 mg Daily   promethazine (PHENERGAN) tablet 25 mg Q6H PRN   sennosides-docusate sodium (SENOKOT-S) 8.6-50 MG tablet 1 tablet Daily PRN   cefepime (MAXIPIME) 2 g IVPB extended (mini-bag) Q12H   acetaminophen (TYLENOL) tablet 650 mg Q4H PRN   lactated ringers infusion Continuous     Todays labs:  Recent Labs     08/05/19  1852 08/06/19  0530 08/07/19  0440 08/08/19  0515   WBC 2.1* 1.6* 2.4* 2.8*   HGB 9.9* 9.1* 9.0* 8.8*    156 134 118*   BUN 11 11 11  --    CREATININE 0.7 0.7 0.7  --                                                      Objective:     Patient Vitals for the past 8 hrs:   BP Temp Pulse Resp SpO2   08/08/19 0836 113/73 97.1 °F (36.2 °C) 71 18 98 %             Impression:     Principal Problem:    Sepsis ([address]  Active Problems:    Fever  Resolved Problems:    * No resolved hospital problems.  *          Plan:     WBC 2.8 with an
Melia Velásquez,     escitalopram (LEXAPRO) tablet 5 mg, 5 mg, Oral, Daily, Susanne Gallagher DO, 5 mg at 08/07/19 4387    ferrous sulfate tablet 325 mg, 325 mg, Oral, Daily, Susanne Gallagher, , 325 mg at 08/07/19 8046    loperamide (IMODIUM) capsule 2 mg, 2 mg, Oral, 4x Daily PRN, Susanne Gallagher DO    LORazepam (ATIVAN) tablet 0.5 mg, 0.5 mg, Oral, Q8H PRN, Susanne Gallagher DO, 0.5 mg at 08/06/19 2104    lipase-protease-amylase (CREON) 42840 units delayed release capsule 6 capsule, 6 capsule, Oral, TID WC, Susanne Gallagher DO, 6 capsule at 08/07/19 1341    pantoprazole (PROTONIX) tablet 40 mg, 40 mg, Oral, Daily, Susanne Gallagher DO, 40 mg at 08/07/19 9794    promethazine (PHENERGAN) tablet 25 mg, 25 mg, Oral, Q6H PRN, Susanne Gallagher DO    sennosides-docusate sodium (SENOKOT-S) 8.6-50 MG tablet 1 tablet, 1 tablet, Oral, Daily PRN, Susanne Gallagher DO    cefepime (MAXIPIME) 2 g IVPB extended (mini-bag), 2 g, Intravenous, Q12H, Susanne Gallagher DO, Last Rate: 12.5 mL/hr at 08/07/19 1341, 2 g at 08/07/19 1341    acetaminophen (TYLENOL) tablet 650 mg, 650 mg, Oral, Q4H PRN, Susanne Gallagher DO, 650 mg at 08/06/19 0201    lactated ringers infusion, , Intravenous, Continuous, Susanne Gallagher DO, Last Rate: 100 mL/hr at 08/07/19 0689    Assessment:    Principal Problem:    Sepsis [facility])  Active Problems:    Fever  Resolved Problems:    * No resolved hospital problems. *  81-year-old gentleman stage IIb pancreatic cancer status post Whipple September 2018. He had completed 12 cycles of FOLFIRINOX May 2019. Evidence of metastatic disease to liver June 2019 biopsy-proven. He started Gemzar and Abraxane in June. He had cycle 2-day 1 July 31, 2019. Now admitted with fever likely related to a source. plan:  Patient had hospitalization recently with gram-negative alton infection. ID is following.   Follow-up on the MRCP that was done this afternoon to
cholangitis (RPC) given his hx of metastatic pancreatic adenocarcinoma s/p whipple on 7/18 at [facility]  - Remote cultures + E.coli which is a common etiology of bilary infection  - Note that pneumobilia is commonly seen with whipple  - The pt iswithout hyperbilirubinemia and abdominal pain, his fevers have resolved  - Potential component of stasis from an anatomical/stricturing component post whipple vs carcinoma, pt may benefit LOAN vs. biliary stent vs. from long term prophylactic abx  - MRCP was ordered and there appears to be progression of liver metastasis and worsenign obstruction of the L hepatic biliary duct. Again there is no signs of ductal obstruction on labs. The MR was reviewed with radiology. - ID is on the case, and providing abx (cefepime and vanco)  - We will check stool O&P and crypto  - I had a long discussion with the patient and his daughter, and discussed options of going to [facility] vs long term Abx. At this point given his long term prognosis the patient would like to for go transfer to [facility] for potential stent placement and pursue long term prophylactic abx for RPC. - We will discuss with the ID/Medical team the ideal abx, likely levaquin, culture on 7/19 was E.coli which was pan-sensitive, note the patient does have a mediport.      2. Pancreatic Adenocarcinoma s/p whipple  - Pt receiving chemo via Dr. Evelio Moreno  Other medical issues managed on this admission  1. Chemotherapy-induced pancytopenia  2. History of DVT and PE in the 1990s  3. Anxiety  4. Sleep apnea  5. Chronic back pain      Pt was seen, d/w, and examined with Dr. Chester Thapa and Dr. Gosia Love,   8/8/2019  10:34 AM    Pt seen and independently examined. Pertinent notes and lab work reviewed. Data reviewed -sinus rhythm noted. D/w Dr. Lois Bond with physical exam and A&P. Current plan is for patient to remain on suppressive antibiotic treatment -recommend outpatient follow-up with Dr. Epifanio Fu.   Discussed with
°C) 98.4 °F (36.9 °C) 98.1 °F (36.7 °C) 97.1 °F (36.2 °C)   TempSrc: Oral Oral Oral    SpO2: 95%  98% 98%   Weight:       Height:         Physical Exam  Constitutional:  The patient is awake, alert, and oriented. Skin:    Warm and dry. No rashes were noted. tattoos  HEENT:   AT/NC   Neck:    Supple to movements. No lymphadenopathy. Chest:   No use of accessory muscles to breathe. Symmetrical expansion. Auscultation reveals no wheezing, crackles, or rhonchi. Cardiovascular:  S1 and S2 are rhythmic and regular. No murmurs appreciated. Abdomen:   Positive bowel sounds to auscultation. Benign to palpation. Extremities:   No clubbing, no cyanosis, no edema.   CNS:    Awake and alert  Psych:   Pleasant  Lines: mediport right     I & O - 24hr:    Intake/Output Summary (Last 24 hours) at 8/8/2019 0930  Last data filed at 8/8/2019 0930  Gross per 24 hour   Intake 2590 ml   Output 0 ml   Net 2590 ml     I/O this shift:  In: 370 [P.O.:120; IV Piggyback:250]  Out: 0    Weight change:   LABS:    Recent Labs     08/06/19  0530 08/07/19  0440 08/08/19  0515   WBC 1.6* 2.4* 2.8*   HGB 9.1* 9.0* 8.8*   HCT 29.7* 28.8* 28.2*   MCV 94.6 93.8 93.4    134 118*     Recent Labs     08/05/19  1852 08/06/19  0530 08/07/19  0440    138 140   K 4.6 4.2 3.8    108* 106   CO2 22 24 25   BUN 11 11 11   CREATININE 0.7 0.7 0.7   GFRAA >60 >60 >60   LABGLOM >60 >60 >60   GLUCOSE 119* 97 127*   PROT 6.5 6.3* 6.1*   LABALBU 3.4* 3.2* 3.2*   CALCIUM 8.4* 8.3* 8.4*   BILITOT 0.7 0.6 0.3   ALKPHOS 370* 326* 305*   * 72* 36   * 105* 75*     Recent Labs     08/06/19  0530   PROCAL 0.48*     Lab Results   Component Value Date    SEDRATE 85 (H) 07/19/2019    SEDRATE 55 (H) 05/02/2019     Lab Results   Component Value Date    CRP 8.3 (H) 08/06/2019    CRP 15.7 (H) 07/19/2019    CRP 9.7 (H) 05/02/2019       Recent Labs     08/07/19  0745   [facility]       MICROBIOLOGY:   Recent Labs     08/05/19  9448
BLOODCULT2 5 Days- no growth 07/19/2019    ORG Escherichia coli 07/18/2019    ORG Klebsiella pneumoniae ssp pneumoniae 06/10/2019    ORG Clostridium perfringens 11/13/2018        Recent Labs     08/05/19  1852   LABURIN Growth not present, incubation continues        Recent Labs     08/05/19  1852   STREPNEUMAGU Presumptive NEGATIVE for Pneumococcal pneumonia, suggesting  no current or recent pneumococcal infection. Infection due  to S. pneumoniae cannot be ruled out since the antigen present  in the sample may be below the detection limit of the test.          RADIOLOGY:  US ABDOMEN LIMITED   Final Result   1. Several lesions are seen in the liver in the left hepatic lobe   likely corresponding with the lesion seen on the MRI. 2. No biliary ductal dilatation. XR CHEST PORTABLE   Final Result   No acute cardiopulmonary disease. Assessment/Plan:   63 y.o. male presented with   Chief Complaint   Patient presents with    Generalized Body Aches     Pt had chemo last wedensday. Feeling weak and sick since. Recently seen for infections    Emesis    Fever     Took tylenol at 1 pm      immunocompromised pt chemo for liver ca    Sepsis ([address]    Fever   leukopenia       S/p RX with levaquin for E. Coli septicemia   GI to see  Spoke with Dr Suarez Sender      Imaging and labs were reviewed per medical records and any ID pertinent labs were addressed with the patient. The patient was educated about the diagnosis, prognosis, indications, risks and benefits of treatment. The patient is in agreement with the proposed medical treatment plan. An opportunity to ask questions was given to the patient and questions were answered.             Electronically signed by James Mace MD on 8/7/2019 at 8:46 AM
procedure with most recent chemotherapy this past Wednesday  3. Chemotherapy-induced pancytopenia  4. History of DVT and PE in the 1990s  5. Anxiety  6. Sleep apnea  7. Chronic back pain    Plan:   Based on the patient's previous bacteremia, we suspect the biliary system to be the underlying issue. We will move forward with MRCP today. Pending the results, he may need transfer to a tertiary care facility for further evaluation. He voiced understanding and agreement. Antibiotics are being administered at the discretion of the infectious disease team.  I have discussed the case with the GI team as well. More than 50% of my  time was spent at the bedside counseling/coordinating care with the patient and/or family with face to face contact. This time was spent reviewing notes and laboratory data as well as instructing and counseling the patient. Time I spent with the family or surrogate(s) is included only if the patient was incapable of providing the necessary information or participating in medical decisions. I also discussed the differential diagnosis and all of the proposed management plans with the patient and individuals accompanying the patient. Digna Wadsworth requires this high level of physician care and nursing on the IMC/Telemetry unit due the complexity of decision management and chance of rapid decline or death. Continued cardiac monitoring and higher level of nursing are required. I am readily available for any further decision-making and intervention.        Ricardo Steele DO, F.A.C.O.I.  8/7/2019  11:16 AM

## 2019-08-08 NOTE — DISCHARGE SUMMARY
Name:  Carina Gayle  :  1960  MRN:  [id number]  Room:  6206/7584-72  DOS:  2019       Internal Medicine Discharge Summary    Kavitha Justice. Dilcia Dockery, & [address] Dilcia Dockery, F.A.C.O.I. Roxi Cote D.O., F.A.C.O.I. PCP:  Ann Samuels DO  Admitting Physician:  Mary Jane Walters DO  Consultant(s):   IP CONSULT TO INFECTIOUS DISEASES  IP CONSULT TO PHARMACY  IP CONSULT TO ONCOLOGY  IP CONSULT TO GI      Admission Date:  2019   Discharge Date:  2019      Admission Diagnoses  Fever [R50.9]     Discharge Diagnoses  1. Recurrent febrile illness with known history of E. coli septicemia with indwelling Mediport  2. Pancreatic adenocarcinoma status post Whipple procedure with most recent chemotherapy this past Wednesday  3. Chemotherapy-induced pancytopenia  4. History of DVT and PE in the   5. Anxiety  6. Sleep apnea  7. Chronic back pain  8. Recent E. Coli septicemia-  9. SIRS      Patient Active Problem List:     DDD (degenerative disc disease), lumbar     Protruded lumbar disc     Neural foraminal stenosis of lumbar spine     Lumbar radiculopathy     Degenerative Osteoarthritis of lumbar spine     Facet syndrome, lumbar     Pain of upper abdomen     Neutropenic fever (HCC)     Septicemia (HCC)     Sepsis ([address]     Fever      Brief History of Present Illness and Hospital Course  Carina Gayle is a 63 y.o. male patient of Ann Samuels DO who  has a past medical history of Anticoagulant long-term use, Anxiety, Back pain, Cancer ([address] DDD (degenerative disc disease), lumbar, DVT, Hx of blood clots, Neutropenic fever ([address] Panic attacks, Sleep apnea, and Unspecified sleep apnea. who originally had concerns including Generalized Body Aches (Pt had chemo last . Feeling weak and sick since. Recently seen for infections); Emesis; and Fever (Took tylenol at 1 pm). at presentation on 2019, and was found to have Fever [R50.9] after workup.   The patient was no evidence of pneumothorax. No acute cardiopulmonary disease. Xr Chest Portable    Result Date: 7/18/2019  Reading location: 200 Indication: Fever Comparison: Prior chest radiograph from 6/20/2019 Technique: Portable AP upright chest radiograph was obtained. Findings: A right internal jugular port catheter tip terminates at the superior vena cava. The cardiomediastinal silhouette is stable in size and contours. Bilateral lungs and costophrenic angles are clear. There is no evidence of pneumothorax. No acute cardiopulmonary disease. Mri Abdomen W Wo Contrast Mrcp    Result Date: 8/8/2019  LOCATION: 200 EXAM: MRI ABDOMEN W WO CONTRAST MRCP COMPARISON: None. HISTORY: History of pancreatic adenocarcinoma with multiple episodes of fever. TECHNIQUE: Routine multiplanar multisequence imaging was performed of the abdomen. 18 mL MultiHance administered intravenously. FINDINGS: Since the prior study there has been progression and development of multiple new hypovascular masses in the liver parenchyma. The mass in the left hepatic lobe superiorly measures 2.0 cm, previously 1.3 cm. There is increasing dilatation in the left hepatic lobe. The small peripherally enhancing fluid signal lesions more superiorly in the left hepatic lobe are no longer identified. The largest hypovascular lesion is seen at the junction of the left and right hepatic lobe measuring 2.3 x 2.4 cm. There is normal hepatic and portal vein enhancement with no bland thrombus. There is gas in the left biliary tree from hepaticojejunostomy. Postsurgical changes from partial pancreatectomy noted. There is no increase in pancreatic ductal dilatation. No enlarging lymph nodes are identified. 1.  Findings are consistent with progression of liver metastases and worsening obstruction of the left hepatic lobe biliary tree.     Us Abdomen Limited    Result Date: 8/6/2019  Location:200 Exam: US ABDOMEN LIMITED Comparison: MRI dated June 13, 2019

## 2019-10-08 PROBLEM — Z99.89 OSA ON CPAP: Status: ACTIVE | Noted: 2019-01-01

## 2019-10-08 PROBLEM — M15.9 GENERALIZED OA: Status: ACTIVE | Noted: 2019-01-01

## 2019-10-08 PROBLEM — R16.0 LIVER MASS: Status: ACTIVE | Noted: 2019-01-01

## 2019-10-08 PROBLEM — E78.1 HYPERGLYCERIDEMIA, PURE: Status: ACTIVE | Noted: 2019-01-01

## 2019-10-08 PROBLEM — I26.90 PULMONARY EMBOLISM, SEPTIC (HCC): Status: ACTIVE | Noted: 2019-01-01

## 2019-10-08 PROBLEM — G47.33 OSA ON CPAP: Status: ACTIVE | Noted: 2019-01-01

## 2019-11-05 PROBLEM — A41.9 SEPSIS (HCC): Status: RESOLVED | Noted: 2019-01-01 | Resolved: 2019-01-01

## 2019-11-05 PROBLEM — A41.9 SEPTICEMIA (HCC): Status: RESOLVED | Noted: 2019-01-01 | Resolved: 2019-01-01

## 2019-11-05 PROBLEM — R10.10 PAIN OF UPPER ABDOMEN: Status: RESOLVED | Noted: 2018-05-19 | Resolved: 2019-01-01

## 2019-11-05 PROBLEM — I26.90 PULMONARY EMBOLISM, SEPTIC (HCC): Status: RESOLVED | Noted: 2019-01-01 | Resolved: 2019-01-01

## 2019-11-08 PROBLEM — A41.9 SEPSIS (HCC): Status: ACTIVE | Noted: 2019-01-01

## 2020-01-01 ENCOUNTER — APPOINTMENT (OUTPATIENT)
Dept: CT IMAGING | Age: 60
DRG: 720 | End: 2020-01-01
Payer: MEDICAID

## 2020-01-01 ENCOUNTER — APPOINTMENT (OUTPATIENT)
Dept: GENERAL RADIOLOGY | Age: 60
DRG: 139 | End: 2020-01-01
Payer: MEDICAID

## 2020-01-01 ENCOUNTER — HOSPITAL ENCOUNTER (INPATIENT)
Age: 60
LOS: 2 days | Discharge: HOME | DRG: 139 | End: 2020-01-07
Attending: EMERGENCY MEDICINE | Admitting: INTERNAL MEDICINE
Payer: MEDICAID

## 2020-01-01 ENCOUNTER — HOSPITAL ENCOUNTER (INPATIENT)
Age: 60
LOS: 3 days | Discharge: HOSPICE | DRG: 720 | End: 2020-02-10
Attending: EMERGENCY MEDICINE | Admitting: INTERNAL MEDICINE
Payer: MEDICAID

## 2020-01-01 ENCOUNTER — APPOINTMENT (OUTPATIENT)
Dept: CT IMAGING | Age: 60
DRG: 139 | End: 2020-01-01
Payer: MEDICAID

## 2020-01-01 ENCOUNTER — APPOINTMENT (OUTPATIENT)
Dept: ULTRASOUND IMAGING | Age: 60
DRG: 720 | End: 2020-01-01
Payer: MEDICAID

## 2020-01-01 ENCOUNTER — HOSPITAL ENCOUNTER (OUTPATIENT)
Dept: ULTRASOUND IMAGING | Age: 60
Discharge: HOME | DRG: 720 | End: 2020-02-04
Payer: MEDICAID

## 2020-01-01 ENCOUNTER — HOSPITAL ENCOUNTER (OUTPATIENT)
Age: 60
Setting detail: SPECIMEN
Discharge: HOME | End: 2020-01-14
Payer: MEDICAID

## 2020-01-01 ENCOUNTER — HOSPITAL ENCOUNTER (OUTPATIENT)
Dept: PREOP | Age: 60
Discharge: HOME | End: 2020-01-16
Payer: MEDICAID

## 2020-01-01 VITALS
DIASTOLIC BLOOD PRESSURE: 58 MMHG | HEIGHT: 70 IN | SYSTOLIC BLOOD PRESSURE: 110 MMHG | OXYGEN SATURATION: 93 % | TEMPERATURE: 98.4 F | WEIGHT: 210.4 LBS | RESPIRATION RATE: 18 BRPM | BODY MASS INDEX: 30.12 KG/M2 | HEART RATE: 84 BPM

## 2020-01-01 VITALS
TEMPERATURE: 97.4 F | SYSTOLIC BLOOD PRESSURE: 104 MMHG | WEIGHT: 208 LBS | OXYGEN SATURATION: 96 % | DIASTOLIC BLOOD PRESSURE: 55 MMHG | RESPIRATION RATE: 16 BRPM | HEART RATE: 85 BPM | HEIGHT: 70 IN | BODY MASS INDEX: 29.78 KG/M2

## 2020-01-01 VITALS
DIASTOLIC BLOOD PRESSURE: 61 MMHG | SYSTOLIC BLOOD PRESSURE: 95 MMHG | WEIGHT: 201.6 LBS | BODY MASS INDEX: 28.86 KG/M2 | TEMPERATURE: 97 F | HEART RATE: 95 BPM | RESPIRATION RATE: 27 BRPM | OXYGEN SATURATION: 96 % | HEIGHT: 70 IN

## 2020-01-01 LAB
ABO/RH: NORMAL
ABO/RH: NORMAL
ACINETOBACTER BAUMANNII BY PCR: NOT DETECTED
ADENOVIRUS BY PCR: NOT DETECTED
ALBUMIN SERPL-MCNC: 1.5 G/DL (ref 3.5–5.2)
ALBUMIN SERPL-MCNC: 1.8 G/DL (ref 3.5–5.2)
ALBUMIN SERPL-MCNC: 1.8 G/DL (ref 3.5–5.2)
ALBUMIN SERPL-MCNC: 2 G/DL (ref 3.5–5.2)
ALBUMIN SERPL-MCNC: 2.6 G/DL (ref 3.5–5.2)
ALBUMIN SERPL-MCNC: 2.6 G/DL (ref 3.5–5.2)
ALBUMIN SERPL-MCNC: 3 G/DL (ref 3.5–5.2)
ALP BLD-CCNC: 161 U/L (ref 40–129)
ALP BLD-CCNC: 168 U/L (ref 40–129)
ALP BLD-CCNC: 174 U/L (ref 40–129)
ALP BLD-CCNC: 291 U/L (ref 40–129)
ALP BLD-CCNC: 329 U/L (ref 40–129)
ALP BLD-CCNC: 362 U/L (ref 40–129)
ALP BLD-CCNC: 455 U/L (ref 40–129)
ALT SERPL-CCNC: 18 U/L (ref 0–40)
ALT SERPL-CCNC: 21 U/L (ref 0–40)
ALT SERPL-CCNC: 21 U/L (ref 0–40)
ALT SERPL-CCNC: 25 U/L (ref 0–40)
ALT SERPL-CCNC: 27 U/L (ref 0–40)
ALT SERPL-CCNC: 31 U/L (ref 0–40)
ALT SERPL-CCNC: 36 U/L (ref 0–40)
ANION GAP SERPL CALCULATED.3IONS-SCNC: 10 MMOL/L (ref 7–16)
ANION GAP SERPL CALCULATED.3IONS-SCNC: 10 MMOL/L (ref 7–16)
ANION GAP SERPL CALCULATED.3IONS-SCNC: 11 MMOL/L (ref 7–16)
ANION GAP SERPL CALCULATED.3IONS-SCNC: 11 MMOL/L (ref 7–16)
ANION GAP SERPL CALCULATED.3IONS-SCNC: 13 MMOL/L (ref 7–16)
ANION GAP SERPL CALCULATED.3IONS-SCNC: 14 MMOL/L (ref 7–16)
ANION GAP SERPL CALCULATED.3IONS-SCNC: 14 MMOL/L (ref 7–16)
ANION GAP SERPL CALCULATED.3IONS-SCNC: 9 MMOL/L (ref 7–16)
ANISOCYTOSIS: ABNORMAL
ANTIBODY SCREEN: NORMAL
ANTIBODY SCREEN: NORMAL
APTT: 29.5 SEC (ref 24.5–35.1)
APTT: 31.6 SEC (ref 24.5–35.1)
APTT: 41.2 SEC (ref 24.5–35.1)
APTT: 41.3 SEC (ref 24.5–35.1)
APTT: 44.9 SEC (ref 24.5–35.1)
APTT: 53 SEC (ref 24.5–35.1)
APTT: 53.4 SEC (ref 24.5–35.1)
APTT: 56.6 SEC (ref 24.5–35.1)
AST SERPL-CCNC: 32 U/L (ref 0–39)
AST SERPL-CCNC: 34 U/L (ref 0–39)
AST SERPL-CCNC: 35 U/L (ref 0–39)
AST SERPL-CCNC: 47 U/L (ref 0–39)
AST SERPL-CCNC: 50 U/L (ref 0–39)
AST SERPL-CCNC: 60 U/L (ref 0–39)
AST SERPL-CCNC: 70 U/L (ref 0–39)
B.E.: -0.2 MMOL/L (ref -3–3)
BACTERIA: ABNORMAL /HPF
BASOPHILS ABSOLUTE: 0 E9/L (ref 0–0.2)
BASOPHILS ABSOLUTE: 0.04 E9/L (ref 0–0.2)
BASOPHILS RELATIVE PERCENT: 0 % (ref 0–2)
BASOPHILS RELATIVE PERCENT: 0.3 % (ref 0–2)
BASOPHILS RELATIVE PERCENT: 0.7 % (ref 0–2)
BASOPHILS RELATIVE PERCENT: 0.8 % (ref 0–2)
BILIRUB SERPL-MCNC: 0.5 MG/DL (ref 0–1.2)
BILIRUB SERPL-MCNC: 0.7 MG/DL (ref 0–1.2)
BILIRUB SERPL-MCNC: 0.7 MG/DL (ref 0–1.2)
BILIRUB SERPL-MCNC: 1.3 MG/DL (ref 0–1.2)
BILIRUB SERPL-MCNC: 1.4 MG/DL (ref 0–1.2)
BILIRUB SERPL-MCNC: 1.4 MG/DL (ref 0–1.2)
BILIRUB SERPL-MCNC: 2 MG/DL (ref 0–1.2)
BILIRUBIN DIRECT: 0.2 MG/DL (ref 0–0.3)
BILIRUBIN DIRECT: 1.3 MG/DL (ref 0–0.3)
BILIRUBIN URINE: NEGATIVE
BILIRUBIN URINE: NEGATIVE
BILIRUBIN, INDIRECT: 0.3 MG/DL (ref 0–1)
BILIRUBIN, INDIRECT: 0.5 MG/DL (ref 0–1)
BILIRUBIN, INDIRECT: 0.5 MG/DL (ref 0–1)
BILIRUBIN, INDIRECT: 0.7 MG/DL (ref 0–1)
BLOOD BANK DISPENSE STATUS: NORMAL
BLOOD BANK DISPENSE STATUS: NORMAL
BLOOD BANK PRODUCT CODE: NORMAL
BLOOD BANK PRODUCT CODE: NORMAL
BLOOD CULTURE, ROUTINE: NORMAL
BLOOD, URINE: NEGATIVE
BLOOD, URINE: NEGATIVE
BORDETELLA PARAPERTUSSIS BY PCR: NOT DETECTED
BORDETELLA PERTUSSIS BY PCR: NOT DETECTED
BOTTLE TYPE: NORMAL
BPU ID: NORMAL
BPU ID: NORMAL
BUN BLDV-MCNC: 22 MG/DL (ref 6–20)
BUN BLDV-MCNC: 23 MG/DL (ref 6–20)
BUN BLDV-MCNC: 29 MG/DL (ref 6–20)
BUN BLDV-MCNC: 29 MG/DL (ref 6–20)
BUN BLDV-MCNC: 30 MG/DL (ref 6–20)
BUN BLDV-MCNC: 6 MG/DL (ref 6–20)
BUN BLDV-MCNC: 6 MG/DL (ref 6–20)
BUN BLDV-MCNC: 9 MG/DL (ref 6–20)
BURR CELLS: ABNORMAL
BURR CELLS: ABNORMAL
C DIFF TOXIN/ANTIGEN: NORMAL
C-REACTIVE PROTEIN: 18.9 MG/DL (ref 0–0.4)
C. DIFFICILE TOXIN MOLECULAR: ABNORMAL
CALCIUM SERPL-MCNC: 7.2 MG/DL (ref 8.6–10.2)
CALCIUM SERPL-MCNC: 7.4 MG/DL (ref 8.6–10.2)
CALCIUM SERPL-MCNC: 7.4 MG/DL (ref 8.6–10.2)
CALCIUM SERPL-MCNC: 7.6 MG/DL (ref 8.6–10.2)
CALCIUM SERPL-MCNC: 8.1 MG/DL (ref 8.6–10.2)
CALCIUM SERPL-MCNC: 8.1 MG/DL (ref 8.6–10.2)
CALCIUM SERPL-MCNC: 8.3 MG/DL (ref 8.6–10.2)
CALCIUM SERPL-MCNC: 8.7 MG/DL (ref 8.6–10.2)
CANDIDA ALBICANS BY PCR: NOT DETECTED
CANDIDA GLABRATA BY PCR: NOT DETECTED
CANDIDA KRUSEI BY PCR: NOT DETECTED
CANDIDA PARAPSILOSIS BY PCR: NOT DETECTED
CANDIDA TROPICALIS BY PCR: NOT DETECTED
CHLAMYDOPHILIA PNEUMONIAE BY PCR: NOT DETECTED
CHLORIDE BLD-SCNC: 100 MMOL/L (ref 98–107)
CHLORIDE BLD-SCNC: 104 MMOL/L (ref 98–107)
CHLORIDE BLD-SCNC: 106 MMOL/L (ref 98–107)
CHLORIDE BLD-SCNC: 89 MMOL/L (ref 98–107)
CHLORIDE BLD-SCNC: 93 MMOL/L (ref 98–107)
CHLORIDE BLD-SCNC: 95 MMOL/L (ref 98–107)
CHLORIDE BLD-SCNC: 96 MMOL/L (ref 98–107)
CHLORIDE BLD-SCNC: 97 MMOL/L (ref 98–107)
CHOLESTEROL, TOTAL: 61 MG/DL (ref 0–199)
CLARITY: CLEAR
CLARITY: CLEAR
CO2: 22 MMOL/L (ref 22–29)
CO2: 23 MMOL/L (ref 22–29)
CO2: 24 MMOL/L (ref 22–29)
CO2: 24 MMOL/L (ref 22–29)
COHB: 0.3 % (ref 0–1.5)
COLOR: ABNORMAL
COLOR: YELLOW
CORONAVIRUS 229E BY PCR: NOT DETECTED
CORONAVIRUS HKU1 BY PCR: NOT DETECTED
CORONAVIRUS NL63 BY PCR: NOT DETECTED
CORONAVIRUS OC43 BY PCR: NOT DETECTED
CREAT SERPL-MCNC: 0.7 MG/DL (ref 0.7–1.2)
CREAT SERPL-MCNC: 0.7 MG/DL (ref 0.7–1.2)
CREAT SERPL-MCNC: 0.8 MG/DL (ref 0.7–1.2)
CREAT SERPL-MCNC: 0.9 MG/DL (ref 0.7–1.2)
CREAT SERPL-MCNC: 0.9 MG/DL (ref 0.7–1.2)
CREAT SERPL-MCNC: 1 MG/DL (ref 0.7–1.2)
CREAT SERPL-MCNC: 1.1 MG/DL (ref 0.7–1.2)
CREAT SERPL-MCNC: 1.2 MG/DL (ref 0.7–1.2)
CRITICAL: ABNORMAL
CULTURE, BLOOD 2: ABNORMAL
CULTURE, BLOOD 2: ABNORMAL
CULTURE, BLOOD 2: NORMAL
DATE ANALYZED: ABNORMAL
DATE OF COLLECTION: ABNORMAL
DESCRIPTION BLOOD BANK: NORMAL
DESCRIPTION BLOOD BANK: NORMAL
EKG ATRIAL RATE: 84 BPM
EKG ATRIAL RATE: 95 BPM
EKG P AXIS: 56 DEGREES
EKG P AXIS: 58 DEGREES
EKG P-R INTERVAL: 142 MS
EKG P-R INTERVAL: 150 MS
EKG Q-T INTERVAL: 334 MS
EKG Q-T INTERVAL: 400 MS
EKG QRS DURATION: 84 MS
EKG QRS DURATION: 96 MS
EKG QTC CALCULATION (BAZETT): 419 MS
EKG QTC CALCULATION (BAZETT): 472 MS
EKG R AXIS: 16 DEGREES
EKG R AXIS: 19 DEGREES
EKG T AXIS: 15 DEGREES
EKG T AXIS: 4 DEGREES
EKG VENTRICULAR RATE: 84 BPM
EKG VENTRICULAR RATE: 95 BPM
ENTEROBACTER CLOACAE COMPLEX BY PCR: NOT DETECTED
ENTEROBACTERALES BY PCR: NOT DETECTED
ENTEROCOCCUS BY PCR: NOT DETECTED
EOSINOPHILS ABSOLUTE: 0 E9/L (ref 0.05–0.5)
EOSINOPHILS ABSOLUTE: 0.04 E9/L (ref 0.05–0.5)
EOSINOPHILS RELATIVE PERCENT: 0 % (ref 0–6)
EOSINOPHILS RELATIVE PERCENT: 0 % (ref 0–6)
EOSINOPHILS RELATIVE PERCENT: 0.3 % (ref 0–6)
EOSINOPHILS RELATIVE PERCENT: 1.6 % (ref 0–6)
ESCHERICHIA COLI BY PCR: NOT DETECTED
GFR AFRICAN AMERICAN: >60
GFR NON-AFRICAN AMERICAN: >60 ML/MIN/1.73
GLUCOSE BLD-MCNC: 108 MG/DL (ref 74–99)
GLUCOSE BLD-MCNC: 109 MG/DL (ref 74–99)
GLUCOSE BLD-MCNC: 87 MG/DL (ref 74–99)
GLUCOSE BLD-MCNC: 88 MG/DL (ref 74–99)
GLUCOSE BLD-MCNC: 95 MG/DL (ref 74–99)
GLUCOSE BLD-MCNC: 97 MG/DL (ref 74–99)
GLUCOSE BLD-MCNC: 99 MG/DL (ref 74–99)
GLUCOSE BLD-MCNC: 99 MG/DL (ref 74–99)
GLUCOSE URINE: NEGATIVE MG/DL
GLUCOSE URINE: NEGATIVE MG/DL
HAEMOPHILUS INFLUENZAE BY PCR: NOT DETECTED
HBA1C MFR BLD: 5.2 % (ref 4–5.6)
HCO3: 22.2 MMOL/L (ref 22–26)
HCT VFR BLD CALC: 24.7 % (ref 37–54)
HCT VFR BLD CALC: 25.2 % (ref 37–54)
HCT VFR BLD CALC: 26.1 % (ref 37–54)
HCT VFR BLD CALC: 27.7 % (ref 37–54)
HCT VFR BLD CALC: 27.7 % (ref 37–54)
HCT VFR BLD CALC: 28.4 % (ref 37–54)
HCT VFR BLD CALC: 28.4 % (ref 37–54)
HDLC SERPL-MCNC: 17 MG/DL
HEMOGLOBIN: 7.8 G/DL (ref 12.5–16.5)
HEMOGLOBIN: 8 G/DL (ref 12.5–16.5)
HEMOGLOBIN: 8.2 G/DL (ref 12.5–16.5)
HEMOGLOBIN: 8.4 G/DL (ref 12.5–16.5)
HEMOGLOBIN: 9.1 G/DL (ref 12.5–16.5)
HHB: 2.5 % (ref 0–5)
HUMAN METAPNEUMOVIRUS BY PCR: NOT DETECTED
HUMAN RHINOVIRUS/ENTEROVIRUS BY PCR: NOT DETECTED
HYPOCHROMIA: ABNORMAL
HYPOCHROMIA: ABNORMAL
IMMATURE GRANULOCYTES #: 0.07 E9/L
IMMATURE GRANULOCYTES %: 0.6 % (ref 0–5)
INFLUENZA A BY PCR: NOT DETECTED
INFLUENZA A BY PCR: NOT DETECTED
INFLUENZA B BY PCR: NOT DETECTED
INFLUENZA B BY PCR: NOT DETECTED
INR BLD: 2
KETONES, URINE: NEGATIVE MG/DL
KETONES, URINE: NEGATIVE MG/DL
KLEBSIELLA OXYTOCA BY PCR: NOT DETECTED
KLEBSIELLA PNEUMONIAE GROUP BY PCR: NOT DETECTED
L. PNEUMOPHILA SEROGP 1 UR AG: NORMAL
LAB: ABNORMAL
LACTIC ACID, SEPSIS: 1.7 MMOL/L (ref 0.5–1.9)
LACTIC ACID, SEPSIS: 2.3 MMOL/L (ref 0.5–1.9)
LACTIC ACID, SEPSIS: 2.8 MMOL/L (ref 0.5–1.9)
LACTIC ACID: 1.1 MMOL/L (ref 0.5–2.2)
LDL CHOLESTEROL CALCULATED: 29 MG/DL (ref 0–99)
LEUKOCYTE ESTERASE, URINE: NEGATIVE
LEUKOCYTE ESTERASE, URINE: NEGATIVE
LIPASE: 4 U/L (ref 13–60)
LIPASE: 5 U/L (ref 13–60)
LISTERIA MONOCYTOGENES BY PCR: NOT DETECTED
LV EF: 65 %
LVEF MODALITY: NORMAL
LYMPHOCYTES ABSOLUTE: 0.26 E9/L (ref 1.5–4)
LYMPHOCYTES ABSOLUTE: 0.36 E9/L (ref 1.5–4)
LYMPHOCYTES ABSOLUTE: 0.36 E9/L (ref 1.5–4)
LYMPHOCYTES ABSOLUTE: 0.76 E9/L (ref 1.5–4)
LYMPHOCYTES RELATIVE PERCENT: 16.5 % (ref 20–42)
LYMPHOCYTES RELATIVE PERCENT: 21 % (ref 20–42)
LYMPHOCYTES RELATIVE PERCENT: 28.1 % (ref 20–42)
LYMPHOCYTES RELATIVE PERCENT: 6.3 % (ref 20–42)
Lab: ABNORMAL
MAGNESIUM: 2.1 MG/DL (ref 1.6–2.6)
MCH RBC QN AUTO: 26.6 PG (ref 26–35)
MCH RBC QN AUTO: 26.7 PG (ref 26–35)
MCH RBC QN AUTO: 26.7 PG (ref 26–35)
MCH RBC QN AUTO: 26.9 PG (ref 26–35)
MCH RBC QN AUTO: 27.1 PG (ref 26–35)
MCH RBC QN AUTO: 27.4 PG (ref 26–35)
MCH RBC QN AUTO: 27.5 PG (ref 26–35)
MCHC RBC AUTO-ENTMCNC: 29.6 % (ref 32–34.5)
MCHC RBC AUTO-ENTMCNC: 29.6 % (ref 32–34.5)
MCHC RBC AUTO-ENTMCNC: 30.3 % (ref 32–34.5)
MCHC RBC AUTO-ENTMCNC: 31.6 % (ref 32–34.5)
MCHC RBC AUTO-ENTMCNC: 31.7 % (ref 32–34.5)
MCHC RBC AUTO-ENTMCNC: 32 % (ref 32–34.5)
MCHC RBC AUTO-ENTMCNC: 32.2 % (ref 32–34.5)
MCV RBC AUTO: 83.3 FL (ref 80–99.9)
MCV RBC AUTO: 84.2 FL (ref 80–99.9)
MCV RBC AUTO: 84.8 FL (ref 80–99.9)
MCV RBC AUTO: 86.7 FL (ref 80–99.9)
MCV RBC AUTO: 89.9 FL (ref 80–99.9)
MCV RBC AUTO: 90.2 FL (ref 80–99.9)
MCV RBC AUTO: 90.5 FL (ref 80–99.9)
METHB: 0.4 % (ref 0–1.5)
MODE: ABNORMAL
MONOCYTES ABSOLUTE: 0 E9/L (ref 0.1–0.95)
MONOCYTES ABSOLUTE: 0.1 E9/L (ref 0.1–0.95)
MONOCYTES ABSOLUTE: 0.14 E9/L (ref 0.1–0.95)
MONOCYTES ABSOLUTE: 1.15 E9/L (ref 0.1–0.95)
MONOCYTES RELATIVE PERCENT: 10.5 % (ref 2–12)
MONOCYTES RELATIVE PERCENT: 3.4 % (ref 2–12)
MONOCYTES RELATIVE PERCENT: 6 % (ref 2–12)
MONOCYTES RELATIVE PERCENT: 9.6 % (ref 2–12)
MRSA CULTURE ONLY: NORMAL
MYCOPLASMA PNEUMONIAE BY PCR: NOT DETECTED
NEISSERIA MENINGITIDIS BY PCR: NOT DETECTED
NEUTROPHILS ABSOLUTE: 0.79 E9/L (ref 1.8–7.3)
NEUTROPHILS ABSOLUTE: 1.24 E9/L (ref 1.8–7.3)
NEUTROPHILS ABSOLUTE: 1.26 E9/L (ref 1.8–7.3)
NEUTROPHILS ABSOLUTE: 9.91 E9/L (ref 1.8–7.3)
NEUTROPHILS RELATIVE PERCENT: 61.4 % (ref 43–80)
NEUTROPHILS RELATIVE PERCENT: 73 % (ref 43–80)
NEUTROPHILS RELATIVE PERCENT: 82.9 % (ref 43–80)
NEUTROPHILS RELATIVE PERCENT: 83.5 % (ref 43–80)
NITRITE, URINE: NEGATIVE
NITRITE, URINE: NEGATIVE
O2 CONTENT: 12.2 ML/DL
O2 SATURATION: 97.5 % (ref 92–98.5)
O2HB: 96.8 % (ref 94–97)
OPERATOR ID: ABNORMAL
ORDER NUMBER: NORMAL
ORGANISM: ABNORMAL
OVALOCYTES: ABNORMAL
OVALOCYTES: ABNORMAL
PARAINFLUENZA VIRUS 1 BY PCR: NOT DETECTED
PARAINFLUENZA VIRUS 2 BY PCR: NOT DETECTED
PARAINFLUENZA VIRUS 3 BY PCR: NOT DETECTED
PARAINFLUENZA VIRUS 4 BY PCR: NOT DETECTED
PATIENT TEMP: 37 C
PCO2: 27.7 MMHG (ref 35–45)
PDW BLD-RTO: 17.1 FL (ref 11.5–15)
PDW BLD-RTO: 17.2 FL (ref 11.5–15)
PDW BLD-RTO: 17.2 FL (ref 11.5–15)
PDW BLD-RTO: 18.4 FL (ref 11.5–15)
PDW BLD-RTO: 18.6 FL (ref 11.5–15)
PH BLOOD GAS: 7.52 (ref 7.35–7.45)
PH UA: 5 (ref 5–9)
PH UA: 7 (ref 5–9)
PHOSPHORUS: 2.9 MG/DL (ref 2.5–4.5)
PLATELET # BLD: 200 E9/L (ref 130–450)
PLATELET # BLD: 206 E9/L (ref 130–450)
PLATELET # BLD: 21 E9/L (ref 130–450)
PLATELET # BLD: 21 E9/L (ref 130–450)
PLATELET # BLD: 240 E9/L (ref 130–450)
PLATELET # BLD: 30 E9/L (ref 130–450)
PLATELET # BLD: 38 E9/L (ref 130–450)
PLATELET CONFIRMATION: NORMAL
PMV BLD AUTO: 10 FL (ref 7–12)
PMV BLD AUTO: 9.6 FL (ref 7–12)
PMV BLD AUTO: 9.7 FL (ref 7–12)
PMV BLD AUTO: ABNORMAL FL (ref 7–12)
PO2: 109.5 MMHG (ref 75–100)
POIKILOCYTES: ABNORMAL
POIKILOCYTES: ABNORMAL
POLYCHROMASIA: ABNORMAL
POTASSIUM REFLEX MAGNESIUM: 3.8 MMOL/L (ref 3.5–5)
POTASSIUM REFLEX MAGNESIUM: 4.1 MMOL/L (ref 3.5–5)
POTASSIUM REFLEX MAGNESIUM: 4.2 MMOL/L (ref 3.5–5)
POTASSIUM REFLEX MAGNESIUM: 4.3 MMOL/L (ref 3.5–5)
POTASSIUM SERPL-SCNC: 3.2 MMOL/L (ref 3.5–5)
POTASSIUM SERPL-SCNC: 3.5 MMOL/L (ref 3.5–5)
POTASSIUM SERPL-SCNC: 3.7 MMOL/L (ref 3.5–5)
POTASSIUM SERPL-SCNC: 3.9 MMOL/L (ref 3.5–5)
PROCALCITONIN: 0.31 NG/ML (ref 0–0.08)
PROCALCITONIN: 0.32 NG/ML (ref 0–0.08)
PROCALCITONIN: 8.28 NG/ML (ref 0–0.08)
PROTEIN UA: NEGATIVE MG/DL
PROTEIN UA: NEGATIVE MG/DL
PROTEUS BY PCR: NOT DETECTED
PROTHROMBIN TIME: 22.8 SEC (ref 9.3–12.4)
PSEUDOMONAS AERUGINOSA BY PCR: NOT DETECTED
RBC # BLD: 2.85 E12/L (ref 3.8–5.8)
RBC # BLD: 2.97 E12/L (ref 3.8–5.8)
RBC # BLD: 3.06 E12/L (ref 3.8–5.8)
RBC # BLD: 3.08 E12/L (ref 3.8–5.8)
RBC # BLD: 3.1 E12/L (ref 3.8–5.8)
RBC # BLD: 3.15 E12/L (ref 3.8–5.8)
RBC # BLD: 3.41 E12/L (ref 3.8–5.8)
RBC UA: ABNORMAL /HPF (ref 0–2)
REASON FOR REJECTION: NORMAL
REJECTED TEST: NORMAL
RESPIRATORY SYNCYTIAL VIRUS BY PCR: NOT DETECTED
SEDIMENTATION RATE, ERYTHROCYTE: 69 MM/HR (ref 0–15)
SERRATIA MARCESCENS BY PCR: NOT DETECTED
SODIUM BLD-SCNC: 127 MMOL/L (ref 132–146)
SODIUM BLD-SCNC: 128 MMOL/L (ref 132–146)
SODIUM BLD-SCNC: 129 MMOL/L (ref 132–146)
SODIUM BLD-SCNC: 130 MMOL/L (ref 132–146)
SODIUM BLD-SCNC: 130 MMOL/L (ref 132–146)
SODIUM BLD-SCNC: 137 MMOL/L (ref 132–146)
SODIUM BLD-SCNC: 137 MMOL/L (ref 132–146)
SODIUM BLD-SCNC: 139 MMOL/L (ref 132–146)
SOURCE OF BLOOD CULTURE: NORMAL
SOURCE, BLOOD GAS: ABNORMAL
SPECIFIC GRAVITY UA: 1.02 (ref 1–1.03)
SPECIFIC GRAVITY UA: <=1.005 (ref 1–1.03)
STAPHYLOCOCCUS AUREUS BY PCR: NOT DETECTED
STAPHYLOCOCCUS SPECIES BY PCR: NOT DETECTED
STREP PNEUMONIAE ANTIGEN, URINE: NORMAL
STREPTOCOCCUS AGALACTIAE BY PCR: NOT DETECTED
STREPTOCOCCUS PNEUMONIAE BY PCR: NOT DETECTED
STREPTOCOCCUS PYOGENES  BY PCR: NOT DETECTED
STREPTOCOCCUS SPECIES BY PCR: NOT DETECTED
TEAR DROP CELLS: ABNORMAL
THB: 8.8 G/DL (ref 11.5–16.5)
TIME ANALYZED: 2040
TOTAL PROTEIN: 4.5 G/DL (ref 6.4–8.3)
TOTAL PROTEIN: 4.9 G/DL (ref 6.4–8.3)
TOTAL PROTEIN: 5.1 G/DL (ref 6.4–8.3)
TOTAL PROTEIN: 5.5 G/DL (ref 6.4–8.3)
TOTAL PROTEIN: 6 G/DL (ref 6.4–8.3)
TOTAL PROTEIN: 6.2 G/DL (ref 6.4–8.3)
TOTAL PROTEIN: 6.5 G/DL (ref 6.4–8.3)
TRIGL SERPL-MCNC: 77 MG/DL (ref 0–149)
TROPONIN: 0.06 NG/ML (ref 0–0.03)
TROPONIN: <0.01 NG/ML (ref 0–0.03)
TSH SERPL DL<=0.05 MIU/L-ACNC: 2.37 UIU/ML (ref 0.27–4.2)
URINE CULTURE, ROUTINE: NORMAL
URINE CULTURE, ROUTINE: NORMAL
UROBILINOGEN, URINE: 0.2 E.U./DL
UROBILINOGEN, URINE: 2 E.U./DL
VANCOMYCIN TROUGH: 12.7 MCG/ML (ref 5–16)
VLDLC SERPL CALC-MCNC: 15 MG/DL
WBC # BLD: 1.3 E9/L (ref 4.5–11.5)
WBC # BLD: 1.5 E9/L (ref 4.5–11.5)
WBC # BLD: 1.7 E9/L (ref 4.5–11.5)
WBC # BLD: 1.7 E9/L (ref 4.5–11.5)
WBC # BLD: 12 E9/L (ref 4.5–11.5)
WBC # BLD: 8 E9/L (ref 4.5–11.5)
WBC # BLD: 8.7 E9/L (ref 4.5–11.5)
WBC UA: ABNORMAL /HPF (ref 0–5)

## 2020-01-01 PROCEDURE — 96365 THER/PROPH/DIAG IV INF INIT: CPT

## 2020-01-01 PROCEDURE — 6360000002 HC RX W HCPCS: Performed by: NURSE PRACTITIONER

## 2020-01-01 PROCEDURE — 2580000003 HC RX 258: Performed by: INTERNAL MEDICINE

## 2020-01-01 PROCEDURE — 87493 C DIFF AMPLIFIED PROBE: CPT

## 2020-01-01 PROCEDURE — 86140 C-REACTIVE PROTEIN: CPT

## 2020-01-01 PROCEDURE — 80048 BASIC METABOLIC PNL TOTAL CA: CPT

## 2020-01-01 PROCEDURE — 71045 X-RAY EXAM CHEST 1 VIEW: CPT

## 2020-01-01 PROCEDURE — 36591 DRAW BLOOD OFF VENOUS DEVICE: CPT

## 2020-01-01 PROCEDURE — 85027 COMPLETE CBC AUTOMATED: CPT

## 2020-01-01 PROCEDURE — 36415 COLL VENOUS BLD VENIPUNCTURE: CPT

## 2020-01-01 PROCEDURE — 2580000003 HC RX 258: Performed by: NURSE PRACTITIONER

## 2020-01-01 PROCEDURE — 87502 INFLUENZA DNA AMP PROBE: CPT

## 2020-01-01 PROCEDURE — 2700000000 HC OXYGEN THERAPY PER DAY

## 2020-01-01 PROCEDURE — 84484 ASSAY OF TROPONIN QUANT: CPT

## 2020-01-01 PROCEDURE — 86850 RBC ANTIBODY SCREEN: CPT

## 2020-01-01 PROCEDURE — 6370000000 HC RX 637 (ALT 250 FOR IP): Performed by: INTERNAL MEDICINE

## 2020-01-01 PROCEDURE — 83605 ASSAY OF LACTIC ACID: CPT

## 2020-01-01 PROCEDURE — 85730 THROMBOPLASTIN TIME PARTIAL: CPT

## 2020-01-01 PROCEDURE — 85025 COMPLETE CBC W/AUTO DIFF WBC: CPT

## 2020-01-01 PROCEDURE — 80061 LIPID PANEL: CPT

## 2020-01-01 PROCEDURE — 83036 HEMOGLOBIN GLYCOSYLATED A1C: CPT

## 2020-01-01 PROCEDURE — 82805 BLOOD GASES W/O2 SATURATION: CPT

## 2020-01-01 PROCEDURE — 2500000003 HC RX 250 WO HCPCS: Performed by: NURSE PRACTITIONER

## 2020-01-01 PROCEDURE — 74177 CT ABD & PELVIS W/CONTRAST: CPT

## 2020-01-01 PROCEDURE — 84145 PROCALCITONIN (PCT): CPT

## 2020-01-01 PROCEDURE — 93010 ELECTROCARDIOGRAM REPORT: CPT | Performed by: INTERNAL MEDICINE

## 2020-01-01 PROCEDURE — 2580000003 HC RX 258: Performed by: SPECIALIST

## 2020-01-01 PROCEDURE — 97165 OT EVAL LOW COMPLEX 30 MIN: CPT

## 2020-01-01 PROCEDURE — 6370000000 HC RX 637 (ALT 250 FOR IP): Performed by: NURSE PRACTITIONER

## 2020-01-01 PROCEDURE — 96366 THER/PROPH/DIAG IV INF ADDON: CPT

## 2020-01-01 PROCEDURE — 97110 THERAPEUTIC EXERCISES: CPT

## 2020-01-01 PROCEDURE — 6360000002 HC RX W HCPCS: Performed by: EMERGENCY MEDICINE

## 2020-01-01 PROCEDURE — 81001 URINALYSIS AUTO W/SCOPE: CPT

## 2020-01-01 PROCEDURE — 87449 NOS EACH ORGANISM AG IA: CPT

## 2020-01-01 PROCEDURE — 85610 PROTHROMBIN TIME: CPT

## 2020-01-01 PROCEDURE — 87088 URINE BACTERIA CULTURE: CPT

## 2020-01-01 PROCEDURE — 93005 ELECTROCARDIOGRAM TRACING: CPT | Performed by: STUDENT IN AN ORGANIZED HEALTH CARE EDUCATION/TRAINING PROGRAM

## 2020-01-01 PROCEDURE — C1729 CATH, DRAINAGE: HCPCS

## 2020-01-01 PROCEDURE — 86901 BLOOD TYPING SEROLOGIC RH(D): CPT

## 2020-01-01 PROCEDURE — 87040 BLOOD CULTURE FOR BACTERIA: CPT

## 2020-01-01 PROCEDURE — 71275 CT ANGIOGRAPHY CHEST: CPT

## 2020-01-01 PROCEDURE — 87077 CULTURE AEROBIC IDENTIFY: CPT

## 2020-01-01 PROCEDURE — 80053 COMPREHEN METABOLIC PANEL: CPT

## 2020-01-01 PROCEDURE — 84443 ASSAY THYROID STIM HORMONE: CPT

## 2020-01-01 PROCEDURE — 6360000002 HC RX W HCPCS: Performed by: SPECIALIST

## 2020-01-01 PROCEDURE — 85651 RBC SED RATE NONAUTOMATED: CPT

## 2020-01-01 PROCEDURE — 6360000002 HC RX W HCPCS: Performed by: INTERNAL MEDICINE

## 2020-01-01 PROCEDURE — 6360000004 HC RX CONTRAST MEDICATION: Performed by: RADIOLOGY

## 2020-01-01 PROCEDURE — 80076 HEPATIC FUNCTION PANEL: CPT

## 2020-01-01 PROCEDURE — 87324 CLOSTRIDIUM AG IA: CPT

## 2020-01-01 PROCEDURE — 87186 SC STD MICRODIL/AGAR DIL: CPT

## 2020-01-01 PROCEDURE — 2000000000 HC ICU R&B

## 2020-01-01 PROCEDURE — 84100 ASSAY OF PHOSPHORUS: CPT

## 2020-01-01 PROCEDURE — 94669 MECHANICAL CHEST WALL OSCILL: CPT

## 2020-01-01 PROCEDURE — 96375 TX/PRO/DX INJ NEW DRUG ADDON: CPT

## 2020-01-01 PROCEDURE — 6370000000 HC RX 637 (ALT 250 FOR IP): Performed by: SPECIALIST

## 2020-01-01 PROCEDURE — 97530 THERAPEUTIC ACTIVITIES: CPT

## 2020-01-01 PROCEDURE — 36592 COLLECT BLOOD FROM PICC: CPT

## 2020-01-01 PROCEDURE — 87150 DNA/RNA AMPLIFIED PROBE: CPT

## 2020-01-01 PROCEDURE — 83735 ASSAY OF MAGNESIUM: CPT

## 2020-01-01 PROCEDURE — 93005 ELECTROCARDIOGRAM TRACING: CPT | Performed by: EMERGENCY MEDICINE

## 2020-01-01 PROCEDURE — 1200000000 HC SEMI PRIVATE

## 2020-01-01 PROCEDURE — P9016 RBC LEUKOCYTES REDUCED: HCPCS

## 2020-01-01 PROCEDURE — C8929 TTE W OR WO FOL WCON,DOPPLER: HCPCS

## 2020-01-01 PROCEDURE — 2580000003 HC RX 258: Performed by: STUDENT IN AN ORGANIZED HEALTH CARE EDUCATION/TRAINING PROGRAM

## 2020-01-01 PROCEDURE — 83690 ASSAY OF LIPASE: CPT

## 2020-01-01 PROCEDURE — 6360000004 HC RX CONTRAST MEDICATION: Performed by: INTERNAL MEDICINE

## 2020-01-01 PROCEDURE — 87450 HC DIRECT STREP B ANTIGEN: CPT

## 2020-01-01 PROCEDURE — 94640 AIRWAY INHALATION TREATMENT: CPT

## 2020-01-01 PROCEDURE — 87081 CULTURE SCREEN ONLY: CPT

## 2020-01-01 PROCEDURE — 86900 BLOOD TYPING SEROLOGIC ABO: CPT

## 2020-01-01 PROCEDURE — 99291 CRITICAL CARE FIRST HOUR: CPT

## 2020-01-01 PROCEDURE — 97530 THERAPEUTIC ACTIVITIES: CPT | Performed by: PHYSICAL THERAPIST

## 2020-01-01 PROCEDURE — 93971 EXTREMITY STUDY: CPT

## 2020-01-01 PROCEDURE — 2500000003 HC RX 250 WO HCPCS: Performed by: INTERNAL MEDICINE

## 2020-01-01 PROCEDURE — C9113 INJ PANTOPRAZOLE SODIUM, VIA: HCPCS | Performed by: EMERGENCY MEDICINE

## 2020-01-01 PROCEDURE — 99285 EMERGENCY DEPT VISIT HI MDM: CPT

## 2020-01-01 PROCEDURE — 86923 COMPATIBILITY TEST ELECTRIC: CPT

## 2020-01-01 PROCEDURE — 6370000000 HC RX 637 (ALT 250 FOR IP): Performed by: STUDENT IN AN ORGANIZED HEALTH CARE EDUCATION/TRAINING PROGRAM

## 2020-01-01 PROCEDURE — 36430 TRANSFUSION BLD/BLD COMPNT: CPT

## 2020-01-01 PROCEDURE — 0W9G3ZZ DRAINAGE OF PERITONEAL CAVITY, PERCUTANEOUS APPROACH: ICD-10-PCS | Performed by: RADIOLOGY

## 2020-01-01 PROCEDURE — 80202 ASSAY OF VANCOMYCIN: CPT

## 2020-01-01 PROCEDURE — 6360000002 HC RX W HCPCS: Performed by: STUDENT IN AN ORGANIZED HEALTH CARE EDUCATION/TRAINING PROGRAM

## 2020-01-01 PROCEDURE — 2580000003 HC RX 258: Performed by: EMERGENCY MEDICINE

## 2020-01-01 PROCEDURE — 81003 URINALYSIS AUTO W/O SCOPE: CPT

## 2020-01-01 PROCEDURE — 97161 PT EVAL LOW COMPLEX 20 MIN: CPT | Performed by: PHYSICAL THERAPIST

## 2020-01-01 PROCEDURE — 0100U HC RESPIRPTHGN MULT REV TRANS & AMP PRB TECH 21 TRGT: CPT

## 2020-01-01 RX ORDER — POLYETHYLENE GLYCOL 3350 17 G/17G
17 POWDER, FOR SOLUTION ORAL DAILY PRN
Status: ON HOLD | COMMUNITY
End: 2020-01-01 | Stop reason: HOSPADM

## 2020-01-01 RX ORDER — 0.9 % SODIUM CHLORIDE 0.9 %
1000 INTRAVENOUS SOLUTION INTRAVENOUS ONCE
Status: COMPLETED | OUTPATIENT
Start: 2020-01-01 | End: 2020-01-01

## 2020-01-01 RX ORDER — HYOSCYAMINE SULFATE 0.125 MG
125 TABLET,DISINTEGRATING ORAL EVERY 4 HOURS PRN
Status: DISCONTINUED | OUTPATIENT
Start: 2020-01-01 | End: 2020-01-01 | Stop reason: HOSPADM

## 2020-01-01 RX ORDER — HYOSCYAMINE SULFATE 0.12 MG/1
125 TABLET SUBLINGUAL EVERY 4 HOURS PRN
Status: DISCONTINUED | OUTPATIENT
Start: 2020-01-01 | End: 2020-01-01 | Stop reason: SDUPTHER

## 2020-01-01 RX ORDER — FONDAPARINUX SODIUM 7.5 MG/.6ML
7.5 INJECTION SUBCUTANEOUS DAILY
Status: DISCONTINUED | OUTPATIENT
Start: 2020-01-01 | End: 2020-01-01

## 2020-01-01 RX ORDER — LORAZEPAM 1 MG/1
1 TABLET ORAL EVERY 4 HOURS PRN
Status: DISCONTINUED | OUTPATIENT
Start: 2020-01-01 | End: 2020-01-01 | Stop reason: HOSPADM

## 2020-01-01 RX ORDER — SODIUM CHLORIDE 9 MG/ML
12.5 INJECTION, SOLUTION INTRAVENOUS EVERY 12 HOURS
Status: DISCONTINUED | OUTPATIENT
Start: 2020-01-01 | End: 2020-01-01

## 2020-01-01 RX ORDER — SODIUM CHLORIDE 9 MG/ML
INJECTION, SOLUTION INTRAVENOUS EVERY 8 HOURS
Status: DISCONTINUED | OUTPATIENT
Start: 2020-01-01 | End: 2020-01-01 | Stop reason: HOSPADM

## 2020-01-01 RX ORDER — IBUPROFEN 800 MG/1
400 TABLET ORAL EVERY 6 HOURS PRN
Status: DISCONTINUED | OUTPATIENT
Start: 2020-01-01 | End: 2020-01-01 | Stop reason: HOSPADM

## 2020-01-01 RX ORDER — LORAZEPAM 2 MG/ML
1 INJECTION INTRAMUSCULAR EVERY 4 HOURS PRN
Status: DISCONTINUED | OUTPATIENT
Start: 2020-01-01 | End: 2020-01-01 | Stop reason: HOSPADM

## 2020-01-01 RX ORDER — HEPARIN SODIUM 10000 [USP'U]/100ML
18 INJECTION, SOLUTION INTRAVENOUS CONTINUOUS
Status: DISCONTINUED | OUTPATIENT
Start: 2020-01-01 | End: 2020-01-01

## 2020-01-01 RX ORDER — BUDESONIDE 0.5 MG/2ML
0.5 INHALANT ORAL 2 TIMES DAILY
Status: DISCONTINUED | OUTPATIENT
Start: 2020-01-01 | End: 2020-01-01 | Stop reason: HOSPADM

## 2020-01-01 RX ORDER — SODIUM CHLORIDE 0.9 % (FLUSH) 0.9 %
10 SYRINGE (ML) INJECTION PRN
Status: DISCONTINUED | OUTPATIENT
Start: 2020-01-01 | End: 2020-01-01 | Stop reason: HOSPADM

## 2020-01-01 RX ORDER — PANTOPRAZOLE SODIUM 40 MG/1
40 TABLET, DELAYED RELEASE ORAL DAILY
Status: DISCONTINUED | OUTPATIENT
Start: 2020-01-01 | End: 2020-01-01 | Stop reason: HOSPADM

## 2020-01-01 RX ORDER — OXYCODONE HYDROCHLORIDE 5 MG/1
5 TABLET ORAL
Status: DISCONTINUED | OUTPATIENT
Start: 2020-01-01 | End: 2020-01-01 | Stop reason: HOSPADM

## 2020-01-01 RX ORDER — SODIUM CHLORIDE 0.9 % (FLUSH) 0.9 %
10 SYRINGE (ML) INJECTION EVERY 12 HOURS SCHEDULED
Status: DISCONTINUED | OUTPATIENT
Start: 2020-01-01 | End: 2020-01-01 | Stop reason: HOSPADM

## 2020-01-01 RX ORDER — ASPIRIN 81 MG/1
81 TABLET, CHEWABLE ORAL DAILY
Qty: 30 TABLET | Refills: 3 | COMMUNITY
Start: 2020-01-01 | End: 2020-01-01 | Stop reason: ALTCHOICE

## 2020-01-01 RX ORDER — SENNA AND DOCUSATE SODIUM 50; 8.6 MG/1; MG/1
1 TABLET, FILM COATED ORAL DAILY PRN
Status: DISCONTINUED | OUTPATIENT
Start: 2020-01-01 | End: 2020-01-01

## 2020-01-01 RX ORDER — SODIUM CHLORIDE 9 MG/ML
INJECTION, SOLUTION INTRAVENOUS EVERY 8 HOURS
Status: DISCONTINUED | OUTPATIENT
Start: 2020-01-01 | End: 2020-01-01

## 2020-01-01 RX ORDER — OXYCODONE HYDROCHLORIDE 5 MG/1
10 TABLET ORAL
Status: DISCONTINUED | OUTPATIENT
Start: 2020-01-01 | End: 2020-01-01 | Stop reason: HOSPADM

## 2020-01-01 RX ORDER — ALBUTEROL SULFATE 2.5 MG/3ML
2.5 SOLUTION RESPIRATORY (INHALATION) EVERY 4 HOURS PRN
Status: DISCONTINUED | OUTPATIENT
Start: 2020-01-01 | End: 2020-01-01 | Stop reason: HOSPADM

## 2020-01-01 RX ORDER — LORAZEPAM 0.5 MG/1
0.5 TABLET ORAL 2 TIMES DAILY PRN
Status: DISCONTINUED | OUTPATIENT
Start: 2020-01-01 | End: 2020-01-01 | Stop reason: HOSPADM

## 2020-01-01 RX ORDER — MORPHINE SULFATE 15 MG/1
15 TABLET, FILM COATED, EXTENDED RELEASE ORAL EVERY 12 HOURS SCHEDULED
Status: DISCONTINUED | OUTPATIENT
Start: 2020-01-01 | End: 2020-01-01 | Stop reason: HOSPADM

## 2020-01-01 RX ORDER — FERROUS SULFATE 325(65) MG
325 TABLET ORAL
Status: ON HOLD | COMMUNITY
End: 2020-01-01 | Stop reason: HOSPADM

## 2020-01-01 RX ORDER — FORMOTEROL FUMARATE 20 UG/2ML
20 SOLUTION RESPIRATORY (INHALATION) 2 TIMES DAILY
Status: DISCONTINUED | OUTPATIENT
Start: 2020-01-01 | End: 2020-01-01 | Stop reason: HOSPADM

## 2020-01-01 RX ORDER — IBUPROFEN 200 MG
400 TABLET ORAL EVERY 6 HOURS PRN
COMMUNITY

## 2020-01-01 RX ORDER — OXYCODONE HYDROCHLORIDE 5 MG/1
5 TABLET ORAL
Status: DISCONTINUED | OUTPATIENT
Start: 2020-01-01 | End: 2020-01-01 | Stop reason: CLARIF

## 2020-01-01 RX ORDER — HEPARIN SODIUM (PORCINE) LOCK FLUSH IV SOLN 100 UNIT/ML 100 UNIT/ML
100 SOLUTION INTRAVENOUS ONCE
Status: COMPLETED | OUTPATIENT
Start: 2020-01-01 | End: 2020-01-01

## 2020-01-01 RX ORDER — POTASSIUM CHLORIDE AND SODIUM CHLORIDE 900; 300 MG/100ML; MG/100ML
INJECTION, SOLUTION INTRAVENOUS CONTINUOUS
Status: DISCONTINUED | OUTPATIENT
Start: 2020-01-01 | End: 2020-01-01 | Stop reason: HOSPADM

## 2020-01-01 RX ORDER — MORPHINE SULFATE 2 MG/ML
2 INJECTION, SOLUTION INTRAMUSCULAR; INTRAVENOUS
Status: DISCONTINUED | OUTPATIENT
Start: 2020-01-01 | End: 2020-01-01 | Stop reason: HOSPADM

## 2020-01-01 RX ORDER — HEPARIN SODIUM 1000 [USP'U]/ML
40 INJECTION, SOLUTION INTRAVENOUS; SUBCUTANEOUS PRN
Status: DISCONTINUED | OUTPATIENT
Start: 2020-01-01 | End: 2020-01-01

## 2020-01-01 RX ORDER — OXYCODONE HYDROCHLORIDE 5 MG/1
5 TABLET ORAL EVERY 6 HOURS PRN
Qty: 12 TABLET | Refills: 0 | Status: SHIPPED | OUTPATIENT
Start: 2020-01-01 | End: 2020-01-01

## 2020-01-01 RX ORDER — OXYCODONE HYDROCHLORIDE 10 MG/1
10 TABLET ORAL EVERY 4 HOURS PRN
COMMUNITY

## 2020-01-01 RX ORDER — ACETAMINOPHEN 325 MG/1
650 TABLET ORAL EVERY 6 HOURS PRN
Status: DISCONTINUED | OUTPATIENT
Start: 2020-01-01 | End: 2020-01-01 | Stop reason: HOSPADM

## 2020-01-01 RX ORDER — BISMUTH SUBSALICYLATE 262 MG/1
524 TABLET, CHEWABLE ORAL EVERY 4 HOURS PRN
Status: DISCONTINUED | OUTPATIENT
Start: 2020-01-01 | End: 2020-01-01 | Stop reason: HOSPADM

## 2020-01-01 RX ORDER — ACETAMINOPHEN 325 MG/1
650 TABLET ORAL SEE ADMIN INSTRUCTIONS
Status: COMPLETED | OUTPATIENT
Start: 2020-01-01 | End: 2020-01-01

## 2020-01-01 RX ORDER — MORPHINE SULFATE 15 MG/1
15 TABLET, FILM COATED, EXTENDED RELEASE ORAL 2 TIMES DAILY PRN
COMMUNITY

## 2020-01-01 RX ORDER — POTASSIUM CHLORIDE 7.45 MG/ML
10 INJECTION INTRAVENOUS PRN
Status: DISCONTINUED | OUTPATIENT
Start: 2020-01-01 | End: 2020-01-01 | Stop reason: HOSPADM

## 2020-01-01 RX ORDER — ASPIRIN 81 MG/1
81 TABLET, CHEWABLE ORAL DAILY
Status: DISCONTINUED | OUTPATIENT
Start: 2020-01-01 | End: 2020-01-01 | Stop reason: HOSPADM

## 2020-01-01 RX ORDER — HEPARIN SODIUM 1000 [USP'U]/ML
80 INJECTION, SOLUTION INTRAVENOUS; SUBCUTANEOUS ONCE
Status: COMPLETED | OUTPATIENT
Start: 2020-01-01 | End: 2020-01-01

## 2020-01-01 RX ORDER — PANTOPRAZOLE SODIUM 40 MG/10ML
80 INJECTION, POWDER, LYOPHILIZED, FOR SOLUTION INTRAVENOUS ONCE
Status: COMPLETED | OUTPATIENT
Start: 2020-01-01 | End: 2020-01-01

## 2020-01-01 RX ORDER — OCTREOTIDE ACETATE 50 UG/ML
50 INJECTION, SOLUTION INTRAVENOUS; SUBCUTANEOUS ONCE
Status: DISCONTINUED | OUTPATIENT
Start: 2020-01-01 | End: 2020-01-01

## 2020-01-01 RX ORDER — HYOSCYAMINE SULFATE 0.12 MG/1
0.12 TABLET SUBLINGUAL EVERY 4 HOURS PRN
Status: DISCONTINUED | OUTPATIENT
Start: 2020-01-01 | End: 2020-01-01 | Stop reason: CLARIF

## 2020-01-01 RX ORDER — OXYCODONE HYDROCHLORIDE 5 MG/1
10 TABLET ORAL
Status: DISCONTINUED | OUTPATIENT
Start: 2020-01-01 | End: 2020-01-01 | Stop reason: CLARIF

## 2020-01-01 RX ORDER — OXYCODONE HYDROCHLORIDE 5 MG/1
5 TABLET ORAL EVERY 4 HOURS PRN
Status: DISCONTINUED | OUTPATIENT
Start: 2020-01-01 | End: 2020-01-01 | Stop reason: HOSPADM

## 2020-01-01 RX ORDER — ALBUTEROL SULFATE 90 UG/1
2 AEROSOL, METERED RESPIRATORY (INHALATION) 4 TIMES DAILY PRN
Status: DISCONTINUED | OUTPATIENT
Start: 2020-01-01 | End: 2020-01-01 | Stop reason: CLARIF

## 2020-01-01 RX ORDER — ACETAMINOPHEN 500 MG
1000 TABLET ORAL ONCE
Status: COMPLETED | OUTPATIENT
Start: 2020-01-01 | End: 2020-01-01

## 2020-01-01 RX ORDER — MAGNESIUM SULFATE 1 G/100ML
1 INJECTION INTRAVENOUS PRN
Status: DISCONTINUED | OUTPATIENT
Start: 2020-01-01 | End: 2020-01-01 | Stop reason: HOSPADM

## 2020-01-01 RX ORDER — ALPRAZOLAM 0.25 MG/1
0.25 TABLET ORAL 3 TIMES DAILY PRN
Status: DISCONTINUED | OUTPATIENT
Start: 2020-01-01 | End: 2020-01-01

## 2020-01-01 RX ORDER — PANTOPRAZOLE SODIUM 40 MG/1
40 TABLET, DELAYED RELEASE ORAL
Status: DISCONTINUED | OUTPATIENT
Start: 2020-01-01 | End: 2020-01-01 | Stop reason: HOSPADM

## 2020-01-01 RX ORDER — ASPIRIN 81 MG/1
324 TABLET, CHEWABLE ORAL ONCE
Status: COMPLETED | OUTPATIENT
Start: 2020-01-01 | End: 2020-01-01

## 2020-01-01 RX ORDER — HYOSCYAMINE SULFATE 0.12 MG/1
0.12 TABLET SUBLINGUAL EVERY 4 HOURS PRN
Status: DISCONTINUED | OUTPATIENT
Start: 2020-01-01 | End: 2020-01-01 | Stop reason: SDUPTHER

## 2020-01-01 RX ORDER — POLYETHYLENE GLYCOL 3350 17 G/17G
17 POWDER, FOR SOLUTION ORAL DAILY PRN
Status: DISCONTINUED | OUTPATIENT
Start: 2020-01-01 | End: 2020-01-01

## 2020-01-01 RX ORDER — SODIUM CHLORIDE 9 MG/ML
INJECTION, SOLUTION INTRAVENOUS CONTINUOUS
Status: DISCONTINUED | OUTPATIENT
Start: 2020-01-01 | End: 2020-01-01

## 2020-01-01 RX ORDER — ESCITALOPRAM OXALATE 10 MG/1
5 TABLET ORAL DAILY
Status: DISCONTINUED | OUTPATIENT
Start: 2020-01-01 | End: 2020-01-01 | Stop reason: HOSPADM

## 2020-01-01 RX ORDER — SENNA AND DOCUSATE SODIUM 50; 8.6 MG/1; MG/1
1 TABLET, FILM COATED ORAL DAILY PRN
Status: DISCONTINUED | OUTPATIENT
Start: 2020-01-01 | End: 2020-01-01 | Stop reason: HOSPADM

## 2020-01-01 RX ORDER — HYOSCYAMINE SULFATE 0.12 MG/ML
6 LIQUID ORAL EVERY 4 HOURS PRN
Status: DISCONTINUED | OUTPATIENT
Start: 2020-01-01 | End: 2020-01-01 | Stop reason: SDUPTHER

## 2020-01-01 RX ORDER — GABAPENTIN 300 MG/1
300 CAPSULE ORAL 2 TIMES DAILY
Status: DISCONTINUED | OUTPATIENT
Start: 2020-01-01 | End: 2020-01-01 | Stop reason: HOSPADM

## 2020-01-01 RX ORDER — PROMETHAZINE HYDROCHLORIDE 25 MG/1
25 TABLET ORAL EVERY 6 HOURS PRN
Status: DISCONTINUED | OUTPATIENT
Start: 2020-01-01 | End: 2020-01-01 | Stop reason: HOSPADM

## 2020-01-01 RX ORDER — MORPHINE SULFATE 15 MG/1
15 TABLET, FILM COATED, EXTENDED RELEASE ORAL 2 TIMES DAILY
Qty: 6 TABLET | Refills: 0 | Status: SHIPPED | OUTPATIENT
Start: 2020-01-01 | End: 2020-01-01

## 2020-01-01 RX ORDER — IBUPROFEN 400 MG/1
400 TABLET ORAL EVERY 6 HOURS PRN
Status: DISCONTINUED | OUTPATIENT
Start: 2020-01-01 | End: 2020-01-01 | Stop reason: CLARIF

## 2020-01-01 RX ORDER — ALBUTEROL SULFATE 2.5 MG/3ML
2.5 SOLUTION RESPIRATORY (INHALATION) 4 TIMES DAILY PRN
Status: DISCONTINUED | OUTPATIENT
Start: 2020-01-01 | End: 2020-01-01 | Stop reason: HOSPADM

## 2020-01-01 RX ORDER — HEPARIN SODIUM 1000 [USP'U]/ML
80 INJECTION, SOLUTION INTRAVENOUS; SUBCUTANEOUS PRN
Status: DISCONTINUED | OUTPATIENT
Start: 2020-01-01 | End: 2020-01-01

## 2020-01-01 RX ORDER — ESCITALOPRAM OXALATE 10 MG/1
10 TABLET ORAL DAILY
Status: DISCONTINUED | OUTPATIENT
Start: 2020-01-01 | End: 2020-01-01 | Stop reason: HOSPADM

## 2020-01-01 RX ORDER — 0.9 % SODIUM CHLORIDE 0.9 %
250 INTRAVENOUS SOLUTION INTRAVENOUS ONCE
Status: DISCONTINUED | OUTPATIENT
Start: 2020-01-01 | End: 2020-01-01 | Stop reason: HOSPADM

## 2020-01-01 RX ORDER — ARGATROBAN 1 MG/ML
0.25 INJECTION, SOLUTION INTRAVENOUS CONTINUOUS
Status: DISCONTINUED | OUTPATIENT
Start: 2020-01-01 | End: 2020-01-01

## 2020-01-01 RX ORDER — POTASSIUM CHLORIDE 20 MEQ/1
40 TABLET, EXTENDED RELEASE ORAL PRN
Status: DISCONTINUED | OUTPATIENT
Start: 2020-01-01 | End: 2020-01-01 | Stop reason: HOSPADM

## 2020-01-01 RX ORDER — ONDANSETRON 2 MG/ML
4 INJECTION INTRAMUSCULAR; INTRAVENOUS EVERY 6 HOURS PRN
Status: DISCONTINUED | OUTPATIENT
Start: 2020-01-01 | End: 2020-01-01 | Stop reason: HOSPADM

## 2020-01-01 RX ORDER — ALBUTEROL SULFATE 90 UG/1
2 AEROSOL, METERED RESPIRATORY (INHALATION) 4 TIMES DAILY PRN
Qty: 1 INHALER | Refills: 0 | Status: SHIPPED | OUTPATIENT
Start: 2020-01-01

## 2020-01-01 RX ADMIN — THIAMINE HYDROCHLORIDE: 100 INJECTION, SOLUTION INTRAMUSCULAR; INTRAVENOUS at 14:11

## 2020-01-01 RX ADMIN — GABAPENTIN 300 MG: 300 CAPSULE ORAL at 08:45

## 2020-01-01 RX ADMIN — ARGATROBAN 0.25 MCG/KG/MIN: 1 INJECTION INTRAVENOUS at 21:47

## 2020-01-01 RX ADMIN — IBUPROFEN 400 MG: 400 TABLET, FILM COATED ORAL at 14:36

## 2020-01-01 RX ADMIN — DEXTROSE MONOHYDRATE 100 MG: 50 INJECTION, SOLUTION INTRAVENOUS at 15:16

## 2020-01-01 RX ADMIN — Medication 500 MG: at 19:04

## 2020-01-01 RX ADMIN — LORAZEPAM 1 MG: 1 TABLET ORAL at 05:04

## 2020-01-01 RX ADMIN — LORAZEPAM 1 MG: 1 TABLET ORAL at 08:51

## 2020-01-01 RX ADMIN — ALPRAZOLAM 0.25 MG: 0.25 TABLET ORAL at 13:03

## 2020-01-01 RX ADMIN — SODIUM CHLORIDE 1000 ML: 9 INJECTION, SOLUTION INTRAVENOUS at 17:29

## 2020-01-01 RX ADMIN — PANTOPRAZOLE SODIUM 40 MG: 40 TABLET, DELAYED RELEASE ORAL at 07:38

## 2020-01-01 RX ADMIN — ASPIRIN 81 MG 81 MG: 81 TABLET ORAL at 07:39

## 2020-01-01 RX ADMIN — GABAPENTIN 300 MG: 300 CAPSULE ORAL at 20:34

## 2020-01-01 RX ADMIN — ACETAMINOPHEN 1000 MG: 500 TABLET ORAL at 08:30

## 2020-01-01 RX ADMIN — VANCOMYCIN HYDROCHLORIDE 1000 MG: 1 INJECTION, POWDER, LYOPHILIZED, FOR SOLUTION INTRAVENOUS at 03:49

## 2020-01-01 RX ADMIN — Medication 500 MG: at 05:04

## 2020-01-01 RX ADMIN — HYOSCYAMINE SULFATE 125 MCG: 0.12 TABLET, ORALLY DISINTEGRATING ORAL at 11:40

## 2020-01-01 RX ADMIN — METRONIDAZOLE 500 MG: 500 INJECTION, SOLUTION INTRAVENOUS at 11:38

## 2020-01-01 RX ADMIN — PANTOPRAZOLE SODIUM 40 MG: 40 TABLET, DELAYED RELEASE ORAL at 19:04

## 2020-01-01 RX ADMIN — SODIUM CHLORIDE: 9 INJECTION, SOLUTION INTRAVENOUS at 20:52

## 2020-01-01 RX ADMIN — ESCITALOPRAM OXALATE 10 MG: 10 TABLET ORAL at 08:54

## 2020-01-01 RX ADMIN — PIPERACILLIN AND TAZOBACTAM 3.38 G: 3; .375 INJECTION, POWDER, FOR SOLUTION INTRAVENOUS at 08:54

## 2020-01-01 RX ADMIN — ESCITALOPRAM OXALATE 5 MG: 10 TABLET ORAL at 07:53

## 2020-01-01 RX ADMIN — GABAPENTIN 300 MG: 300 CAPSULE ORAL at 01:26

## 2020-01-01 RX ADMIN — GABAPENTIN 300 MG: 300 CAPSULE ORAL at 07:39

## 2020-01-01 RX ADMIN — LORAZEPAM 1 MG: 1 TABLET ORAL at 15:07

## 2020-01-01 RX ADMIN — MORPHINE SULFATE 2 MG: 2 INJECTION, SOLUTION INTRAMUSCULAR; INTRAVENOUS at 07:52

## 2020-01-01 RX ADMIN — METRONIDAZOLE 500 MG: 500 INJECTION, SOLUTION INTRAVENOUS at 20:05

## 2020-01-01 RX ADMIN — Medication 500 MG: at 23:31

## 2020-01-01 RX ADMIN — OXYCODONE 5 MG: 5 TABLET ORAL at 15:29

## 2020-01-01 RX ADMIN — SODIUM CHLORIDE 1000 ML: 9 INJECTION, SOLUTION INTRAVENOUS at 15:30

## 2020-01-01 RX ADMIN — PIPERACILLIN AND TAZOBACTAM 3.38 G: 3; .375 INJECTION, POWDER, LYOPHILIZED, FOR SOLUTION INTRAVENOUS at 03:11

## 2020-01-01 RX ADMIN — MORPHINE SULFATE 2 MG: 2 INJECTION, SOLUTION INTRAMUSCULAR; INTRAVENOUS at 15:52

## 2020-01-01 RX ADMIN — GABAPENTIN 300 MG: 300 CAPSULE ORAL at 08:41

## 2020-01-01 RX ADMIN — Medication 500 MG: at 12:02

## 2020-01-01 RX ADMIN — MORPHINE SULFATE 2 MG: 2 INJECTION, SOLUTION INTRAMUSCULAR; INTRAVENOUS at 00:17

## 2020-01-01 RX ADMIN — SENNOSIDES AND DOCUSATE SODIUM 1 TABLET: 8.6; 5 TABLET ORAL at 20:45

## 2020-01-01 RX ADMIN — GABAPENTIN 300 MG: 300 CAPSULE ORAL at 22:09

## 2020-01-01 RX ADMIN — PIPERACILLIN AND TAZOBACTAM 3.38 G: 3; .375 INJECTION, POWDER, LYOPHILIZED, FOR SOLUTION INTRAVENOUS at 18:35

## 2020-01-01 RX ADMIN — SODIUM CHLORIDE: 9 INJECTION, SOLUTION INTRAVENOUS at 03:45

## 2020-01-01 RX ADMIN — PIPERACILLIN AND TAZOBACTAM 3.38 G: 3; .375 INJECTION, POWDER, LYOPHILIZED, FOR SOLUTION INTRAVENOUS at 10:12

## 2020-01-01 RX ADMIN — PANTOPRAZOLE SODIUM 40 MG: 40 TABLET, DELAYED RELEASE ORAL at 15:58

## 2020-01-01 RX ADMIN — HEPARIN SODIUM AND DEXTROSE 18 UNITS/KG/HR: 10000; 5 INJECTION INTRAVENOUS at 17:29

## 2020-01-01 RX ADMIN — POTASSIUM CHLORIDE AND SODIUM CHLORIDE: 900; 300 INJECTION, SOLUTION INTRAVENOUS at 10:12

## 2020-01-01 RX ADMIN — HEPARIN 500 UNITS: 100 SYRINGE at 17:11

## 2020-01-01 RX ADMIN — SODIUM CHLORIDE: 9 INJECTION, SOLUTION INTRAVENOUS at 11:32

## 2020-01-01 RX ADMIN — PANCRELIPASE 6 CAPSULE: 60000; 12000; 38000 CAPSULE, DELAYED RELEASE PELLETS ORAL at 11:49

## 2020-01-01 RX ADMIN — MORPHINE SULFATE 2 MG: 2 INJECTION, SOLUTION INTRAMUSCULAR; INTRAVENOUS at 11:16

## 2020-01-01 RX ADMIN — ACETAMINOPHEN 650 MG: 325 TABLET, FILM COATED ORAL at 07:06

## 2020-01-01 RX ADMIN — MORPHINE SULFATE 15 MG: 15 TABLET, FILM COATED, EXTENDED RELEASE ORAL at 09:54

## 2020-01-01 RX ADMIN — SODIUM CHLORIDE, PRESERVATIVE FREE 10 ML: 5 INJECTION INTRAVENOUS at 18:25

## 2020-01-01 RX ADMIN — SODIUM CHLORIDE 1000 ML: 9 INJECTION, SOLUTION INTRAVENOUS at 09:13

## 2020-01-01 RX ADMIN — CEFTRIAXONE SODIUM 1 G: 1 INJECTION, POWDER, FOR SOLUTION INTRAMUSCULAR; INTRAVENOUS at 01:26

## 2020-01-01 RX ADMIN — THIAMINE HYDROCHLORIDE: 100 INJECTION, SOLUTION INTRAMUSCULAR; INTRAVENOUS at 00:17

## 2020-01-01 RX ADMIN — SODIUM CHLORIDE: 9 INJECTION, SOLUTION INTRAVENOUS at 03:11

## 2020-01-01 RX ADMIN — PIPERACILLIN AND TAZOBACTAM 3.38 G: 3; .375 INJECTION, POWDER, FOR SOLUTION INTRAVENOUS at 08:30

## 2020-01-01 RX ADMIN — PANCRELIPASE 6 CAPSULE: 60000; 12000; 38000 CAPSULE, DELAYED RELEASE PELLETS ORAL at 07:51

## 2020-01-01 RX ADMIN — SODIUM CHLORIDE, PRESERVATIVE FREE 10 ML: 5 INJECTION INTRAVENOUS at 08:55

## 2020-01-01 RX ADMIN — ALPRAZOLAM 0.25 MG: 0.25 TABLET ORAL at 18:19

## 2020-01-01 RX ADMIN — TBO-FILGRASTIM 480 MCG: 480 INJECTION, SOLUTION SUBCUTANEOUS at 19:07

## 2020-01-01 RX ADMIN — ALPRAZOLAM 0.25 MG: 0.25 TABLET ORAL at 00:26

## 2020-01-01 RX ADMIN — BUDESONIDE 500 MCG: 0.5 INHALANT RESPIRATORY (INHALATION) at 16:59

## 2020-01-01 RX ADMIN — SODIUM CHLORIDE: 9 INJECTION, SOLUTION INTRAVENOUS at 13:47

## 2020-01-01 RX ADMIN — PANTOPRAZOLE SODIUM 40 MG: 40 TABLET, DELAYED RELEASE ORAL at 05:04

## 2020-01-01 RX ADMIN — LORAZEPAM 1 MG: 1 TABLET ORAL at 23:30

## 2020-01-01 RX ADMIN — SODIUM CHLORIDE, PRESERVATIVE FREE 10 ML: 5 INJECTION INTRAVENOUS at 21:04

## 2020-01-01 RX ADMIN — TBO-FILGRASTIM 480 MCG: 480 INJECTION, SOLUTION SUBCUTANEOUS at 18:19

## 2020-01-01 RX ADMIN — GABAPENTIN 300 MG: 300 CAPSULE ORAL at 20:45

## 2020-01-01 RX ADMIN — GABAPENTIN 300 MG: 300 CAPSULE ORAL at 08:54

## 2020-01-01 RX ADMIN — SODIUM CHLORIDE: 9 INJECTION, SOLUTION INTRAVENOUS at 18:25

## 2020-01-01 RX ADMIN — APIXABAN 5 MG: 5 TABLET, FILM COATED ORAL at 20:45

## 2020-01-01 RX ADMIN — PANTOPRAZOLE SODIUM 40 MG: 40 TABLET, DELAYED RELEASE ORAL at 07:52

## 2020-01-01 RX ADMIN — SODIUM CHLORIDE, PRESERVATIVE FREE 10 ML: 5 INJECTION INTRAVENOUS at 06:14

## 2020-01-01 RX ADMIN — FORMOTEROL FUMARATE DIHYDRATE 20 MCG: 20 SOLUTION RESPIRATORY (INHALATION) at 16:59

## 2020-01-01 RX ADMIN — SODIUM CHLORIDE, PRESERVATIVE FREE 10 ML: 5 INJECTION INTRAVENOUS at 20:35

## 2020-01-01 RX ADMIN — BUDESONIDE 500 MCG: 0.5 INHALANT RESPIRATORY (INHALATION) at 06:06

## 2020-01-01 RX ADMIN — FONDAPARINUX SODIUM 7.5 MG: 7.5 INJECTION SUBCUTANEOUS at 11:45

## 2020-01-01 RX ADMIN — SODIUM CHLORIDE, PRESERVATIVE FREE 10 ML: 5 INJECTION INTRAVENOUS at 05:11

## 2020-01-01 RX ADMIN — SODIUM CHLORIDE, PRESERVATIVE FREE 10 ML: 5 INJECTION INTRAVENOUS at 08:52

## 2020-01-01 RX ADMIN — BISMUTH SUBSALICYLATE 524 MG: 262 TABLET, CHEWABLE ORAL at 11:40

## 2020-01-01 RX ADMIN — APIXABAN 5 MG: 5 TABLET, FILM COATED ORAL at 22:09

## 2020-01-01 RX ADMIN — PIPERACILLIN AND TAZOBACTAM 3.38 G: 3; .375 INJECTION, POWDER, LYOPHILIZED, FOR SOLUTION INTRAVENOUS at 03:38

## 2020-01-01 RX ADMIN — OXYCODONE HYDROCHLORIDE 5 MG: 5 TABLET ORAL at 03:12

## 2020-01-01 RX ADMIN — ACETAMINOPHEN 650 MG: 325 TABLET, FILM COATED ORAL at 22:09

## 2020-01-01 RX ADMIN — GABAPENTIN 300 MG: 300 CAPSULE ORAL at 20:58

## 2020-01-01 RX ADMIN — PIPERACILLIN AND TAZOBACTAM 3.38 G: 3; .375 INJECTION, POWDER, LYOPHILIZED, FOR SOLUTION INTRAVENOUS at 10:30

## 2020-01-01 RX ADMIN — BUDESONIDE 500 MCG: 0.5 INHALANT RESPIRATORY (INHALATION) at 16:31

## 2020-01-01 RX ADMIN — MORPHINE SULFATE 2 MG: 2 INJECTION, SOLUTION INTRAMUSCULAR; INTRAVENOUS at 21:12

## 2020-01-01 RX ADMIN — FORMOTEROL FUMARATE DIHYDRATE 20 MCG: 20 SOLUTION RESPIRATORY (INHALATION) at 06:06

## 2020-01-01 RX ADMIN — SODIUM CHLORIDE: 9 INJECTION, SOLUTION INTRAVENOUS at 13:07

## 2020-01-01 RX ADMIN — SODIUM CHLORIDE: 9 INJECTION, SOLUTION INTRAVENOUS at 02:34

## 2020-01-01 RX ADMIN — PIPERACILLIN AND TAZOBACTAM 3.38 G: 3; .375 INJECTION, POWDER, FOR SOLUTION INTRAVENOUS at 23:45

## 2020-01-01 RX ADMIN — PANCRELIPASE 6 CAPSULE: 60000; 12000; 38000 CAPSULE, DELAYED RELEASE PELLETS ORAL at 11:28

## 2020-01-01 RX ADMIN — PIPERACILLIN AND TAZOBACTAM 3.38 G: 3; .375 INJECTION, POWDER, LYOPHILIZED, FOR SOLUTION INTRAVENOUS at 10:33

## 2020-01-01 RX ADMIN — SODIUM CHLORIDE, PRESERVATIVE FREE 10 ML: 5 INJECTION INTRAVENOUS at 08:38

## 2020-01-01 RX ADMIN — SODIUM CHLORIDE, PRESERVATIVE FREE 10 ML: 5 INJECTION INTRAVENOUS at 20:25

## 2020-01-01 RX ADMIN — PANTOPRAZOLE SODIUM 80 MG: 40 INJECTION, POWDER, FOR SOLUTION INTRAVENOUS at 21:47

## 2020-01-01 RX ADMIN — ESCITALOPRAM OXALATE 10 MG: 10 TABLET ORAL at 08:45

## 2020-01-01 RX ADMIN — PERFLUTREN 0.4 MG: 6.52 INJECTION, SUSPENSION INTRAVENOUS at 10:29

## 2020-01-01 RX ADMIN — VANCOMYCIN HYDROCHLORIDE 2000 MG: 10 INJECTION, POWDER, LYOPHILIZED, FOR SOLUTION INTRAVENOUS at 23:21

## 2020-01-01 RX ADMIN — SODIUM CHLORIDE, PRESERVATIVE FREE 10 ML: 5 INJECTION INTRAVENOUS at 06:13

## 2020-01-01 RX ADMIN — LORAZEPAM 1 MG: 1 TABLET ORAL at 13:08

## 2020-01-01 RX ADMIN — ESCITALOPRAM OXALATE 10 MG: 10 TABLET ORAL at 08:41

## 2020-01-01 RX ADMIN — PIPERACILLIN AND TAZOBACTAM 3.38 G: 3; .375 INJECTION, POWDER, LYOPHILIZED, FOR SOLUTION INTRAVENOUS at 18:29

## 2020-01-01 RX ADMIN — PANCRELIPASE 6 CAPSULE: 60000; 12000; 38000 CAPSULE, DELAYED RELEASE PELLETS ORAL at 07:38

## 2020-01-01 RX ADMIN — VANCOMYCIN HYDROCHLORIDE 2000 MG: 10 INJECTION, POWDER, LYOPHILIZED, FOR SOLUTION INTRAVENOUS at 10:33

## 2020-01-01 RX ADMIN — PIPERACILLIN AND TAZOBACTAM 3.38 G: 3; .375 INJECTION, POWDER, FOR SOLUTION INTRAVENOUS at 15:46

## 2020-01-01 RX ADMIN — Medication 250 MG: at 11:38

## 2020-01-01 RX ADMIN — PANCRELIPASE 72000 UNITS: 60000; 12000; 38000 CAPSULE, DELAYED RELEASE PELLETS ORAL at 08:41

## 2020-01-01 RX ADMIN — PANTOPRAZOLE SODIUM 40 MG: 40 TABLET, DELAYED RELEASE ORAL at 06:26

## 2020-01-01 RX ADMIN — PIPERACILLIN AND TAZOBACTAM 3.38 G: 3; .375 INJECTION, POWDER, FOR SOLUTION INTRAVENOUS at 15:30

## 2020-01-01 RX ADMIN — ESCITALOPRAM OXALATE 5 MG: 10 TABLET ORAL at 07:39

## 2020-01-01 RX ADMIN — SODIUM CHLORIDE, PRESERVATIVE FREE 10 ML: 5 INJECTION INTRAVENOUS at 07:52

## 2020-01-01 RX ADMIN — Medication 500 MG: at 18:09

## 2020-01-01 RX ADMIN — OXYCODONE HYDROCHLORIDE 5 MG: 5 TABLET ORAL at 07:10

## 2020-01-01 RX ADMIN — APIXABAN 5 MG: 5 TABLET, FILM COATED ORAL at 07:52

## 2020-01-01 RX ADMIN — BUDESONIDE 500 MCG: 0.5 INHALANT RESPIRATORY (INHALATION) at 06:05

## 2020-01-01 RX ADMIN — DEXTROSE MONOHYDRATE 200 MG: 50 INJECTION, SOLUTION INTRAVENOUS at 15:46

## 2020-01-01 RX ADMIN — OXYCODONE HYDROCHLORIDE 5 MG: 5 TABLET ORAL at 18:57

## 2020-01-01 RX ADMIN — IOPAMIDOL 80 ML: 755 INJECTION, SOLUTION INTRAVENOUS at 17:21

## 2020-01-01 RX ADMIN — VANCOMYCIN HYDROCHLORIDE 1000 MG: 1 INJECTION, POWDER, LYOPHILIZED, FOR SOLUTION INTRAVENOUS at 15:10

## 2020-01-01 RX ADMIN — ASPIRIN 81 MG 81 MG: 81 TABLET ORAL at 07:52

## 2020-01-01 RX ADMIN — CEFEPIME HYDROCHLORIDE 2 G: 2 INJECTION, POWDER, FOR SOLUTION INTRAVENOUS at 14:30

## 2020-01-01 RX ADMIN — APIXABAN 5 MG: 5 TABLET, FILM COATED ORAL at 07:39

## 2020-01-01 RX ADMIN — FORMOTEROL FUMARATE DIHYDRATE 20 MCG: 20 SOLUTION RESPIRATORY (INHALATION) at 16:31

## 2020-01-01 RX ADMIN — SODIUM CHLORIDE, PRESERVATIVE FREE 10 ML: 5 INJECTION INTRAVENOUS at 05:09

## 2020-01-01 RX ADMIN — ASPIRIN 81 MG 324 MG: 81 TABLET ORAL at 08:51

## 2020-01-01 RX ADMIN — VANCOMYCIN HYDROCHLORIDE 2000 MG: 10 INJECTION, POWDER, LYOPHILIZED, FOR SOLUTION INTRAVENOUS at 10:12

## 2020-01-01 RX ADMIN — SODIUM CHLORIDE, PRESERVATIVE FREE 10 ML: 5 INJECTION INTRAVENOUS at 04:49

## 2020-01-01 RX ADMIN — SODIUM CHLORIDE: 9 INJECTION, SOLUTION INTRAVENOUS at 01:27

## 2020-01-01 RX ADMIN — POTASSIUM CHLORIDE AND SODIUM CHLORIDE: 900; 300 INJECTION, SOLUTION INTRAVENOUS at 07:40

## 2020-01-01 RX ADMIN — PIPERACILLIN AND TAZOBACTAM 3.38 G: 3; .375 INJECTION, POWDER, FOR SOLUTION INTRAVENOUS at 00:01

## 2020-01-01 RX ADMIN — ALPRAZOLAM 0.25 MG: 0.25 TABLET ORAL at 11:38

## 2020-01-01 RX ADMIN — MORPHINE SULFATE 2 MG: 2 INJECTION, SOLUTION INTRAMUSCULAR; INTRAVENOUS at 03:42

## 2020-01-01 RX ADMIN — GABAPENTIN 300 MG: 300 CAPSULE ORAL at 07:52

## 2020-01-01 RX ADMIN — VANCOMYCIN HYDROCHLORIDE 2000 MG: 10 INJECTION, POWDER, LYOPHILIZED, FOR SOLUTION INTRAVENOUS at 22:39

## 2020-01-01 RX ADMIN — VANCOMYCIN HYDROCHLORIDE 1000 MG: 1 INJECTION, POWDER, LYOPHILIZED, FOR SOLUTION INTRAVENOUS at 15:46

## 2020-01-01 RX ADMIN — MORPHINE SULFATE 2 MG: 2 INJECTION, SOLUTION INTRAMUSCULAR; INTRAVENOUS at 18:07

## 2020-01-01 RX ADMIN — IOPAMIDOL 80 ML: 755 INJECTION, SOLUTION INTRAVENOUS at 09:29

## 2020-01-01 RX ADMIN — HEPARIN SODIUM 7400 UNITS: 1000 INJECTION INTRAVENOUS; SUBCUTANEOUS at 17:29

## 2020-01-01 RX ADMIN — LORAZEPAM 1 MG: 1 TABLET ORAL at 17:09

## 2020-01-01 RX ADMIN — SODIUM CHLORIDE, PRESERVATIVE FREE 10 ML: 5 INJECTION INTRAVENOUS at 08:46

## 2020-01-01 RX ADMIN — METRONIDAZOLE 500 MG: 500 INJECTION, SOLUTION INTRAVENOUS at 03:49

## 2020-01-01 RX ADMIN — OXYCODONE 5 MG: 5 TABLET ORAL at 12:25

## 2020-01-01 RX ADMIN — SODIUM CHLORIDE, PRESERVATIVE FREE 10 ML: 5 INJECTION INTRAVENOUS at 06:16

## 2020-01-01 RX ADMIN — SODIUM CHLORIDE, PRESERVATIVE FREE 10 ML: 5 INJECTION INTRAVENOUS at 07:38

## 2020-01-01 RX ADMIN — OXYCODONE HYDROCHLORIDE 5 MG: 5 TABLET ORAL at 23:21

## 2020-01-01 RX ADMIN — SODIUM CHLORIDE: 9 INJECTION, SOLUTION INTRAVENOUS at 16:38

## 2020-01-01 RX ADMIN — SODIUM CHLORIDE: 9 INJECTION, SOLUTION INTRAVENOUS at 07:06

## 2020-01-01 RX ADMIN — HYOSCYAMINE SULFATE 125 MCG: 0.12 TABLET, ORALLY DISINTEGRATING ORAL at 10:08

## 2020-01-01 RX ADMIN — VANCOMYCIN HYDROCHLORIDE 1000 MG: 1 INJECTION, POWDER, LYOPHILIZED, FOR SOLUTION INTRAVENOUS at 03:45

## 2020-01-01 ASSESSMENT — PAIN DESCRIPTION - FREQUENCY
FREQUENCY: CONTINUOUS

## 2020-01-01 ASSESSMENT — PAIN DESCRIPTION - DESCRIPTORS
DESCRIPTORS: SHARP;JABBING
DESCRIPTORS: ACHING;SHARP;DISCOMFORT
DESCRIPTORS: SHARP
DESCRIPTORS: ACHING;CONSTANT
DESCRIPTORS: ACHING;DISCOMFORT
DESCRIPTORS: SHARP
DESCRIPTORS: ACHING;SHARP
DESCRIPTORS: ACHING;CONSTANT;DISCOMFORT

## 2020-01-01 ASSESSMENT — ENCOUNTER SYMPTOMS
TROUBLE SWALLOWING: 1
NAUSEA: 1
WHEEZING: 0
COUGH: 0
CHEST TIGHTNESS: 0
SINUS PRESSURE: 0
EYE PAIN: 0
SHORTNESS OF BREATH: 1
SINUS PRESSURE: 0
BACK PAIN: 0
ALLERGIC/IMMUNOLOGIC NEGATIVE: 1
DIARRHEA: 1
SORE THROAT: 0
CHOKING: 0
ABDOMINAL PAIN: 1
EYE REDNESS: 0
VOMITING: 0
VOMITING: 0
SHORTNESS OF BREATH: 0
COLOR CHANGE: 0
EYE PAIN: 0
DIARRHEA: 0
NAUSEA: 0
CONSTIPATION: 0
BACK PAIN: 0
WHEEZING: 0
ABDOMINAL PAIN: 0
BLOOD IN STOOL: 0
COUGH: 0
EYE DISCHARGE: 0

## 2020-01-01 ASSESSMENT — PAIN DESCRIPTION - ONSET
ONSET: ON-GOING

## 2020-01-01 ASSESSMENT — PAIN SCALES - GENERAL
PAINLEVEL_OUTOF10: 5
PAINLEVEL_OUTOF10: 7
PAINLEVEL_OUTOF10: 6
PAINLEVEL_OUTOF10: 0
PAINLEVEL_OUTOF10: 0
PAINLEVEL_OUTOF10: 6
PAINLEVEL_OUTOF10: 0
PAINLEVEL_OUTOF10: 0
PAINLEVEL_OUTOF10: 10
PAINLEVEL_OUTOF10: 6
PAINLEVEL_OUTOF10: 6
PAINLEVEL_OUTOF10: 5
PAINLEVEL_OUTOF10: 4
PAINLEVEL_OUTOF10: 6
PAINLEVEL_OUTOF10: 5
PAINLEVEL_OUTOF10: 0
PAINLEVEL_OUTOF10: 0
PAINLEVEL_OUTOF10: 2
PAINLEVEL_OUTOF10: 3
PAINLEVEL_OUTOF10: 7
PAINLEVEL_OUTOF10: 6
PAINLEVEL_OUTOF10: 7
PAINLEVEL_OUTOF10: 0
PAINLEVEL_OUTOF10: 0
PAINLEVEL_OUTOF10: 4
PAINLEVEL_OUTOF10: 6
PAINLEVEL_OUTOF10: 10
PAINLEVEL_OUTOF10: 10
PAINLEVEL_OUTOF10: 4
PAINLEVEL_OUTOF10: 10
PAINLEVEL_OUTOF10: 5
PAINLEVEL_OUTOF10: 2
PAINLEVEL_OUTOF10: 0
PAINLEVEL_OUTOF10: 5
PAINLEVEL_OUTOF10: 6

## 2020-01-01 ASSESSMENT — PAIN DESCRIPTION - LOCATION
LOCATION: ABDOMEN
LOCATION: GENERALIZED
LOCATION: ABDOMEN
LOCATION: ABDOMEN

## 2020-01-01 ASSESSMENT — PAIN DESCRIPTION - ORIENTATION
ORIENTATION: LEFT;LOWER
ORIENTATION: LEFT;LOWER
ORIENTATION: LEFT
ORIENTATION: LOWER;LEFT
ORIENTATION: LEFT;LOWER
ORIENTATION: LEFT;LOWER
ORIENTATION: UPPER;RIGHT

## 2020-01-01 ASSESSMENT — PAIN DESCRIPTION - PAIN TYPE
TYPE: ACUTE PAIN
TYPE: CHRONIC PAIN
TYPE: ACUTE PAIN
TYPE: ACUTE PAIN

## 2020-01-01 ASSESSMENT — PAIN - FUNCTIONAL ASSESSMENT
PAIN_FUNCTIONAL_ASSESSMENT: 0-10
PAIN_FUNCTIONAL_ASSESSMENT: ACTIVITIES ARE NOT PREVENTED
PAIN_FUNCTIONAL_ASSESSMENT: ACTIVITIES ARE NOT PREVENTED

## 2020-01-01 ASSESSMENT — PAIN DESCRIPTION - PROGRESSION
CLINICAL_PROGRESSION: GRADUALLY WORSENING
CLINICAL_PROGRESSION: NOT CHANGED
CLINICAL_PROGRESSION: NOT CHANGED

## 2020-01-05 NOTE — ED NOTES
Patient denies nor has had any type of chest pain nor discomfort during ED visit.       Keiko Lam RN  01/05/20 8309

## 2020-01-05 NOTE — PROGRESS NOTES
Pharmacy Consultation Note  (Antibiotic Dosing and Monitoring)    Initial consult date: 20  Consulting physician: Mona Bah  Drug(s): Vancomycin  Indication: Sepsis/Pneumonia    Ht Readings from Last 1 Encounters:   20 5' 10\" (1.778 m)     Wt Readings from Last 1 Encounters:   20 208 lb (94.3 kg)       Age/  Gender IBW DW  Allergy Information   63 y.o.     male 73 kg 94.3 kg  Rivaroxaban                 Date  WBC BUN/CR Drug/Dose Time   Given Level(s)   (Time) Comments    12.0 9/0.7 Vancomycin 2250mg IV x1    Vancomycin 2000mg IV Q12H 1103    2300                                  Estimated Creatinine Clearance: 133 mL/min (based on SCr of 0.7 mg/dL). No intake or output data in the 24 hours ending 20 1256    Temp max: Temp (24hrs), Av.4 °F (37.4 °C), Min:98.2 °F (36.8 °C), Max:100.8 °F (38.2 °C)      Antibiotic Start Stop   Zosyn 3.375g IV Q8H              Cultures:  available culture and sensitivity results were reviewed in [facility]      Assessment:  · Patient is a 63year old male who presented with abdominal pain (Hx of pancreatic adenocarcinoma)  · Abdominal CT showing hepatic metastasis, ascites, and bilateral lower airspace disease  · Goal trough level:  15-20 mcg/mL  · Patient received Vancomycin 2250mg IV x1 prior to pharmacy consult    Plan:  · Start Vancomycin 2000mg IV Q12H tonight at 2300  · Trough at steady state or sooner if needed  · Pharmacist will follow and monitor/adjust dosing as necessary      Janice Hedrick PharmD.  Candidate 2020 12:56 PM

## 2020-01-05 NOTE — ED NOTES
Patient states after talking to the resident - \" you know what, for the past week I kind of had some spasms in my chest but I didn't think anything of it\" Dr. Sima Rhodes aware.       Flory Franco RN  01/05/20 7338

## 2020-01-05 NOTE — ED PROVIDER NOTES
Lips: Pink. Mouth: Mucous membranes are moist.      Pharynx: Oropharynx is clear. Uvula midline. Eyes:      General: Lids are normal.      Conjunctiva/sclera: Conjunctivae normal.   Neck:      Musculoskeletal: Normal range of motion and neck supple. Trachea: Trachea and phonation normal.   Cardiovascular:      Rate and Rhythm: Normal rate and regular rhythm. Heart sounds: Normal heart sounds. Pulmonary:      Effort: Pulmonary effort is normal.      Breath sounds: Normal breath sounds and air entry. Abdominal:      General: Abdomen is protuberant. Palpations: Abdomen is soft. Tenderness: There is no tenderness. There is no guarding or rebound. Comments: Scar noted from prior Whipple procedure. Abdomen is nontender throughout even to deep palpation. Skin:     General: Skin is warm and dry. Neurological:      General: No focal deficit present. Mental Status: He is alert and oriented to person, place, and time. GCS: GCS eye subscore is 4. GCS verbal subscore is 5. GCS motor subscore is 6. Psychiatric:         Attention and Perception: Attention and perception normal.         Mood and Affect: Mood and affect normal.         Speech: Speech normal.         Behavior: Behavior normal. Behavior is cooperative. Thought Content: Thought content normal.         Cognition and Memory: Cognition and memory normal.         Judgment: Judgment normal.          Procedures     MDM  Number of Diagnoses or Management Options  HCAP (healthcare-associated pneumonia):   Metastasis from pancreatic cancer [facility]):   NSTEMI (non-ST elevated myocardial infarction) [facility]): Other ascites:   Sepsis, due to unspecified organism, unspecified whether acute organ dysfunction present [facility]):   Splenic infarct:   Diagnosis management comments: Patient's lab work is significant for only a slightly elevated leukocytosis of 12.   Patient slight tachycardic and febrile temperature 100.8 °F at presentation. Patient clinically looks very well, however given his history and the fact that he was slightly tachycardic on presentation I decided to obtain a CT of his abdomen. CT shows worsening liver mets in addition to bilateral lower lobe pneumonias and a splenic infarct. Furthermore, patient has elevated troponin of 0.06 concerning for NSTEMI. Given that patient is still receiving chemo, and his significant comorbidities I will give patient vancomycin and Zosyn for pneumonia, patient also been resuscitated with fluid boluses, heart rate is now 89. Called Dr. Jackie Maharaj, he will admit the patient. Also spoke to Dr. Arianne Slade who will consult on the patient. ED Course as of Jan 05 1214   Sun Jan 05, 2020   1781   ATTENDING PROVIDER ATTESTATION:     I have personally performed and/or participated in the history, exam, medical decision making, and procedures and agree with all pertinent clinical information unless otherwise noted. I have also reviewed and agree with the past medical, family and social history unless otherwise noted. I have discussed this patient in detail with the resident and provided the instruction and education regarding the evidence-based evaluation and treatment of [unfilled]  History: Patient is a 56-year-old male with a history of pancreatic cancer with metastasis to the liver, status post Whipple procedure about 2 years ago at the [facility] clinic, presents to the ED for the chief complaint of abdominal pain. Onset of his abdominal pain started approximately 2 days ago after he was moving and lifting boxes with his son. His abdominal pain is located to the left upper quadrant and left lower quadrant region. He describes it as a constant, sharp pain. It is worse when he takes a breath in. He describes it as a 10 out of 10 on pain scale. He reports it occurs and is worse with movement. He reports it is better when he is laying still. He reports associated fever. His oncologist is Dr. Jayne Lu. He reports last chemo was about 2 to 3 weeks ago. He denies any HA, vision changes, cough, chest pain or SOB. He denies any urinary symptoms. My findings: Sana Jones is a 63 y.o. male whom is in no distress. Physical exam reveals 52-year-old male in no acute distress. HEENT- PERRL. EOMI. MMM. No erythema or exudates. Airway patent. No edema. Neck-no lymphadenopathy or JVD. No meningeal signs. Heart- RRR, no murmur. Lungs- Normal effort. No distress. CTAB. Abd- Old surgical scar noted. Soft, no distension. BS +. No tenderness to palpation. Neuro- AAO, no focal deficit. Skin- no rash. My plan: Symptomatic and supportive care. Electronically signed by Viral Navarro DO on 1/5/20 at 8:44 AM          [AD]   2982 EKG: This EKG is signed and interpreted by me. Rate: 95  Rhythm: Sinus  Interpretation: Normal sinus rhythm with nonspecific changes. Comparison: EKG today shows normal sinus rhythm with nonspecific changes and T wave inversions in leads III and aVF. Prior EKG on 11-8-2019 revealed sinus rhythm with T wave inversions in lead III and aVF. No significant change when compared with previous. [AD]   4911 Re-evaluated the patient and discussed results and plan. Questions were answered.     [AD]      ED Course User Index  [AD] Viral Navarro DO        ED Course as of Jan 05 1230   Sun Jan 05, 2020   2956   ATTENDING PROVIDER ATTESTATION:     I have personally performed and/or participated in the history, exam, medical decision making, and procedures and agree with all pertinent clinical information unless otherwise noted. I have also reviewed and agree with the past medical, family and social history unless otherwise noted.     I have discussed this patient in detail with the resident and provided the instruction and education regarding the evidence-based evaluation and treatment of [unfilled]  History: Patient is a 52-year-old male with 0.0 - 5.0 %    Lymphocytes % 6.3 (L) 20.0 - 42.0 %    Monocytes % 9.6 2.0 - 12.0 %    Eosinophils % 0.3 0.0 - 6.0 %    Basophils % 0.3 0.0 - 2.0 %    Neutrophils Absolute 9.91 (H) 1.80 - 7.30 E9/L    Immature Granulocytes # 0.07 E9/L    Lymphocytes Absolute 0.76 (L) 1.50 - 4.00 E9/L    Monocytes Absolute 1.15 (H) 0.10 - 0.95 E9/L    Eosinophils Absolute 0.04 (L) 0.05 - 0.50 E9/L    Basophils Absolute 0.04 0.00 - 0.20 E9/L   Troponin   Result Value Ref Range    Troponin 0.06 (H) 0.00 - 0.03 ng/mL   Lactate, Sepsis   Result Value Ref Range    Lactic Acid, Sepsis 1.7 0.5 - 1.9 mmol/L   Urinalysis   Result Value Ref Range    Color, UA Yellow Straw/Yellow    Clarity, UA Clear Clear    Glucose, Ur Negative Negative mg/dL    Bilirubin Urine Negative Negative    Ketones, Urine Negative Negative mg/dL    Specific Gravity, UA <=1.005 1.005 - 1.030    Blood, Urine Negative Negative    pH, UA 7.0 5.0 - 9.0    Protein, UA Negative Negative mg/dL    Urobilinogen, Urine 2.0 (A) <2.0 E.U./dL    Nitrite, Urine Negative Negative    Leukocyte Esterase, Urine Negative Negative   Lipase   Result Value Ref Range    Lipase 5 (L) 13 - 60 U/L   Basic Metabolic Panel w/ Reflex to MG   Result Value Ref Range    Sodium 137 132 - 146 mmol/L    Potassium reflex Magnesium 4.3 3.5 - 5.0 mmol/L    Chloride 100 98 - 107 mmol/L    CO2 23 22 - 29 mmol/L    Anion Gap 14 7 - 16 mmol/L    Glucose 99 74 - 99 mg/dL    BUN 9 6 - 20 mg/dL    CREATININE 0.7 0.7 - 1.2 mg/dL    GFR Non-African American >60 >=60 mL/min/1.73    GFR African American >60     Calcium 8.7 8.6 - 10.2 mg/dL   Hepatic function panel   Result Value Ref Range    Total Protein 6.5 6.4 - 8.3 g/dL    Alb 3.0 (L) 3.5 - 5.2 g/dL    Alkaline Phosphatase 174 (H) 40 - 129 U/L    ALT 25 0 - 40 U/L    AST 47 (H) 0 - 39 U/L    Total Bilirubin 0.7 0.0 - 1.2 mg/dL    Bilirubin, Direct 0.2 0.0 - 0.3 mg/dL    Bilirubin, Indirect 0.5 0.0 - 1.0 mg/dL   EKG 12 Lead   Result Value Ref Range    Ventricular Rate 95 BPM    Atrial Rate 95 BPM    P-R Interval 142 ms    QRS Duration 84 ms    Q-T Interval 334 ms    QTc Calculation (Bazett) 419 ms    P Axis 58 degrees    R Axis 19 degrees    T Axis 4 degrees       RADIOLOGY:  CT ABDOMEN PELVIS W IV CONTRAST Additional Contrast? None   Final Result   1. Significant interval worsening of hepatic metastatic disease. 2. Upper abdominal lymph node enlargement likely metastasis. 3. Interval splenic abnormality probably an infarct. 4. Intraparenchymal air within the liver probably within bile ducts. 5. Small ascites. 6. Bilateral lower lobe airspace disease. 7. Other findings as discussed. XR CHEST PORTABLE   Final Result   Small right base atelectasis or fibrosis          ------------------------- NURSING NOTES AND VITALS REVIEWED ---------------------------  Date / Time Roomed:  1/5/2020  7:00 AM  ED Bed Assignment:  [id number]    The nursing notes within the ED encounter and vital signs as below have been reviewed. Patient Vitals for the past 24 hrs:   BP Temp Temp src Pulse Resp SpO2 Height Weight   01/05/20 1032 111/62 99.1 °F (37.3 °C) Oral 89 16 96 % -- --   01/05/20 0854 99/71 -- -- 94 14 95 % -- --   01/05/20 0706 113/64 100.8 °F (38.2 °C) Oral 100 14 96 % 5' 10\" (1.778 m) 208 lb (94.3 kg)       Oxygen Saturation Interpretation: Normal    ------------------------------------------ PROGRESS NOTES ------------------------------------------  Re-evaluation(s):  Time: 1100  Patients symptoms show no change  Repeat physical examination is not changed    Counseling:  I have spoken with the patient and discussed todays results, in addition to providing specific details for the plan of care and counseling regarding the diagnosis and prognosis.   Their questions are answered at this time and they are agreeable with the plan of admission.    --------------------------------- ADDITIONAL PROVIDER NOTES ---------------------------------  Consultations:  Time:
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
## 2020-01-05 NOTE — CONSULTS
Current Medications:     Scheduled Meds:   vancomycin  2,250 mg Intravenous Once    apixaban  5 mg Oral BID    [START ON 1/6/2020] escitalopram  5 mg Oral Daily    gabapentin  300 mg Oral BID    [START ON 1/6/2020] pantoprazole  40 mg Oral Daily    budesonide  0.5 mg Nebulization BID    formoterol  20 mcg Nebulization BID    sodium chloride flush  10 mL Intravenous 2 times per day    vancomycin  20 mg/kg Intravenous Q12H    piperacillin-tazobactam  3.375 g Intravenous Q8H    aspirin  81 mg Oral Daily    lipase-protease-amylase  6 capsule Oral TID      Continuous Infusions:   IV infusion builder       PRN Meds:LORazepam, sennosides-docusate sodium, morphine, sodium chloride flush, magnesium hydroxide, ondansetron, magnesium sulfate, potassium chloride **OR** potassium alternative oral replacement **OR** potassium chloride, albuterol, acetaminophen      PHYSICAL EXAM:  Vitals:    01/05/20 0706 01/05/20 0854 01/05/20 1032 01/05/20 1231   BP: 113/64 99/71 111/62 (!) 102/58   Pulse: 100 94 89 77   Resp: 14 14 16 16   Temp: 100.8 °F (38.2 °C)  99.1 °F (37.3 °C) 98.2 °F (36.8 °C)   TempSrc: Oral  Oral Oral   SpO2: 96% 95% 96% 95%   Weight: 208 lb (94.3 kg)      Height: 5' 10\" (1.778 m)   5' 10\" (1.778 m)       General Appearance:       Alert, cooperative, no distress, appears stated age        Heent:    Normocephalic, atraumatic,     PERRL, conjunctiva/corneas clear     no drainage or sinus tenderness      Neck:   Supple, symmetrical, trachea midline   Back:     Symmetric, no CVA tenderness   Lungs:     Clear to auscultation bilaterally, respirations unlabored   Chest Wall:    No tenderness or deformity    Heart:    Regular rate and rhythm, S1 and S2 normal, no murmur, rub or   gallop   Abdomen:     Soft, non-tender, bowel sounds active all four quadrants         Extremities:   Extremities normal, atraumatic, no cyanosis or edema   Pulses:   LE extremities   Skin:   Skin color, texture, turgor exam findings . I have seen and examined the patient and the key elements of the encounter have been performed by me. I agree with the assessment, plan and orders as documented.       Treatment plan as per my recommendation     Demian Karimi MD, FACP  1/5/2020  11:00 PM
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
## 2020-01-05 NOTE — PROGRESS NOTES
0.7 01/05/2020    ALKPHOS 174 01/05/2020    AST 47 01/05/2020    ALT 25 01/05/2020            Current Facility-Administered Medications:     apixaban (ELIQUIS) tablet 5 mg, 5 mg, Oral, BID, Angela Chavarria APRN - CNP    [START ON 1/6/2020] escitalopram (LEXAPRO) tablet 5 mg, 5 mg, Oral, Daily, Angela Chavarria APRN - CNP    gabapentin (NEURONTIN) capsule 300 mg, 300 mg, Oral, BID, Dorgail Loose, APRN - CNP    LORazepam (ATIVAN) tablet 0.5 mg, 0.5 mg, Oral, BID PRN, Angela Loose, APRN - CNP  Kingsley Parody  [START ON 1/6/2020] pantoprazole (PROTONIX) tablet 40 mg, 40 mg, Oral, Daily, Angela Chavarria APRN - CNP    sennosides-docusate sodium (SENOKOT-S) 8.6-50 MG tablet 1 tablet, 1 tablet, Oral, Daily PRN, Angela Chavarria, APRN - CNP    morphine (PF) injection 2 mg, 2 mg, Intravenous, Q3H PRN, Stevensonathy Deon, APRN - CNP    budesonide (PULMICORT) nebulizer suspension 500 mcg, 0.5 mg, Nebulization, BID, Angela Chavarria, APRN - CNP, 500 mcg at 01/05/20 1659    formoterol (PERFOROMIST) nebulizer solution 20 mcg, 20 mcg, Nebulization, BID, Stevensonathy Deon, APRN - CNP, 20 mcg at 01/05/20 1659    sodium chloride flush 0.9 % injection 10 mL, 10 mL, Intravenous, 2 times per day, Angela Chavarria APRN - CNP    sodium chloride flush 0.9 % injection 10 mL, 10 mL, Intravenous, PRN, Angela Loose, APRN - CNP    magnesium hydroxide (MILK OF MAGNESIA) 400 MG/5ML suspension 30 mL, 30 mL, Oral, Daily PRN, Angela Chavarria APRN - CNP    ondansetron [facility]) injection 4 mg, 4 mg, Intravenous, Q6H PRN, Angela Chavarria APRN - CNP    thiamine 761 mg, folic acid 1 mg in sodium chloride 0.9 % 1,000 mL infusion, , Intravenous, Continuous, Stevensonathy Deon, APRN - CNP, Last Rate: 100 mL/hr at 01/05/20 1411    magnesium sulfate 1 g in dextrose 5% 100 mL IVPB, 1 g, Intravenous, PRN, Dorathy Loose, APRN - CNP    potassium chloride (KLOR-CON M) extended release tablet 40 mEq, 40 mEq, Oral, PRN **OR** potassium bicarb-citric acid (EFFER-K) effervescent tablet 40 mEq, 40 mEq, Oral, PRN **OR** potassium chloride 10 mEq/100 mL IVPB (Peripheral Line), 10 mEq, Intravenous, PRN, Vonzella Carbon, APRN - CNP    albuterol (PROVENTIL) nebulizer solution 2.5 mg, 2.5 mg, Nebulization, Q4H PRN, Vonzella Carbon, APRN - CNP    acetaminophen (TYLENOL) tablet 650 mg, 650 mg, Oral, Q6H PRN, Vonzella Carbon, APRN - CNP    vancomycin 2 g in dextrose 5% 500 ml IVPB, 20 mg/kg, Intravenous, Q12H, Vonzella Carbon, APRN - CNP    piperacillin-tazobactam (ZOSYN) 3.375 g in dextrose 5 % 50 mL IVPB extended infusion (mini-bag), 3.375 g, Intravenous, Q8H, Vonzella Carbon, APRN - CNP    aspirin chewable tablet 81 mg, 81 mg, Oral, Daily, Vonzella Carbon, APRN - CNP    lipase-protease-amylase (CREON) 87007 units delayed release capsule 6 capsule, 6 capsule, Oral, TID WC, Vonzella Carbon, APRN - CNP    Assessment:    Principal Problem:    Sepsis ([address]  Resolved Problems:    * No resolved hospital problems. *    Patient is 52-year-old gentleman with history of metastatic pancreatic cancer diagnosed in June 2019 and who is followed at the [facility] by Dr. Brittney Hernandez. Initially he was diagnosed with early stage disease status post Whipple resection in September 2018, treated with 11 cycles of adjuvant chemotherapy completed in May 2019. Matt Roman He was started on palliative chemotherapy with gemcitabine and Abraxane in June 2019. Last treatment was 12/18/2019. Patient is currently admitted to the hospital with abdominal left upper quadrant and left lower quadrant pain. He was found with fever. The CT scan of abdomen pelvis done today and compared to last one done in September 2019 showed significant interval worsening of hepatic metastatic disease as well as enlargement of upper abdominal lymph nodes. Possible splenic infarct. Plan:  I reviewed the CT scan of the abdomen and pelvis personally and discussed the results with the patient.   Unfortunately there is evidence of significant disease progression in the liver.  He has evidence of splenic infarct which is responsible of left sided abdominal pain. He is already taking Eliquis. I recommend to continue with same anticoagulation for now. He will follow-up with Dr. Cameron Hogue next Wednesday. She will discuss with the next treatment plan either another line of chemotherapy or referral to tertiary center for possible clinical trial.  Patient still has a good performance status. I doubt the patient has any serious infection. He has been experiencing fevers off and on over the last 6 months. Most probably has tumor fever. IV antibiotics should be discontinued once blood cultures results are available. For pain control I recommended oxycodone every 6 hours as needed.       Electronically signed by Carley Martinez MD on 1/5/2020 at 5:01 PM

## 2020-01-06 NOTE — PROGRESS NOTES
[facility] Quality Flow/Interdisciplinary Rounds Progress Note        Quality Flow Rounds held on January 6, 2020    Disciplines Attending:  Bedside Nurse, ,  and Nursing Unit Leadership    Soledad Stark was admitted on 1/5/2020  7:00 AM    Anticipated Discharge Date:  Expected Discharge Date: 01/08/20    Disposition:    Cali Score:  Cali Scale Score: 23    Readmission Risk              Risk of Unplanned Readmission:        26           Discussed patient goal for the day, patient clinical progression, and barriers to discharge.   The following Goal(s) of the Day/Commitment(s) have been identified:  Chemo, Port, activity progression, IV F, IV ATX, PT/OT      [facility]  January 6, 2020

## 2020-01-06 NOTE — PROGRESS NOTES
Subjective: The patient is awake and alert. No problems overnight. Abdominal left upper quadrant pain is controlled with low-dose IV morphine taken every 3 hours. He has good appetite. He is eating without problems. No nausea or vomiting. He is mildly constipated. No chest pain or significant dyspnea.   Low-grade fevers off and on.  Objective:    BP (!) 105/54   Pulse 88   Temp 99.9 °F (37.7 °C) (Oral)   Resp 16   Ht 5' 10\" (1.778 m)   Wt 210 lb 3.2 oz (95.3 kg)   SpO2 94%   BMI 30.16 kg/m²     General: NAD  HEENT: No thrush or mucositis, EOMI, PERRLA  Abd: Soft, nontender, nondistended, no masses  Extrem:  No clubbing, cyanosis, or edema  Skin: Intact, no petechia or purpura    CBC with Differential:    Lab Results   Component Value Date    WBC 8.7 01/06/2020    RBC 3.06 01/06/2020    HGB 8.4 01/06/2020    HCT 27.7 01/06/2020     01/06/2020    MCV 90.5 01/06/2020    MCH 27.5 01/06/2020    MCHC 30.3 01/06/2020    RDW 17.1 01/06/2020    SEGSPCT 85 12/20/2012    LYMPHOPCT 6.3 01/05/2020    MONOPCT 9.6 01/05/2020    MYELOPCT 2.0 11/19/2018    BASOPCT 0.3 01/05/2020    MONOSABS 1.15 01/05/2020    LYMPHSABS 0.76 01/05/2020    EOSABS 0.04 01/05/2020    BASOSABS 0.04 01/05/2020     CMP:    Lab Results   Component Value Date     01/06/2020    K 3.8 01/06/2020     01/06/2020    CO2 23 01/06/2020    BUN 6 01/06/2020    CREATININE 0.7 01/06/2020    GFRAA >60 01/06/2020    LABGLOM >60 01/06/2020    GLUCOSE 99 01/06/2020    PROT 6.0 01/06/2020    LABALBU 2.6 01/06/2020    CALCIUM 8.3 01/06/2020    BILITOT 0.5 01/06/2020    ALKPHOS 161 01/06/2020    AST 35 01/06/2020    ALT 21 01/06/2020              Current Facility-Administered Medications:     piperacillin-tazobactam (ZOSYN) 3.375 g in dextrose 5 % 50 mL IVPB extended infusion (mini-bag), 3.375 g, Intravenous, Q8H, Stopped at 01/06/20 1347 **AND** 0.9 % sodium chloride infusion, , Intravenous, Q8H, RADHA Meyer - CNP, Stopped at 01/06/20 1558    0.9% NaCl with KCl 40 mEq infusion, , Intravenous, Continuous, Barbra Millet, DO, Last Rate: 75 mL/hr at 01/06/20 1012    apixaban (ELIQUIS) tablet 5 mg, 5 mg, Oral, BID, Hernan Kettle, APRN - CNP, 5 mg at 01/06/20 0752    escitalopram (LEXAPRO) tablet 5 mg, 5 mg, Oral, Daily, Hernan Kettle, APRN - CNP, 5 mg at 01/06/20 0753    gabapentin (NEURONTIN) capsule 300 mg, 300 mg, Oral, BID, Hernan Kettle, APRN - CNP, 300 mg at 01/06/20 3780    LORazepam (ATIVAN) tablet 0.5 mg, 0.5 mg, Oral, BID PRN, Hernan Kettle, APRN - CNP    pantoprazole (PROTONIX) tablet 40 mg, 40 mg, Oral, Daily, Hernan Kettle, APRN - CNP, 40 mg at 01/06/20 0752    sennosides-docusate sodium (SENOKOT-S) 8.6-50 MG tablet 1 tablet, 1 tablet, Oral, Daily PRN, Hernan Kettle, APRN - CNP    morphine (PF) injection 2 mg, 2 mg, Intravenous, Q3H PRN, Hernan Kettle, APRN - CNP, 2 mg at 01/06/20 1552    budesonide (PULMICORT) nebulizer suspension 500 mcg, 0.5 mg, Nebulization, BID, Hernan Kettle, APRN - CNP, 500 mcg at 01/06/20 1631    formoterol (PERFOROMIST) nebulizer solution 20 mcg, 20 mcg, Nebulization, BID, Hernan Kettle, APRN - CNP, 20 mcg at 01/06/20 1631    sodium chloride flush 0.9 % injection 10 mL, 10 mL, Intravenous, 2 times per day, Hernan Kettle, APRN - CNP, 10 mL at 01/06/20 0752    sodium chloride flush 0.9 % injection 10 mL, 10 mL, Intravenous, PRN, Hernan Kettle, APRN - CNP    magnesium hydroxide (MILK OF MAGNESIA) 400 MG/5ML suspension 30 mL, 30 mL, Oral, Daily PRN, Hernan Kettle, APRN - CNP    ondansetron [facility] injection 4 mg, 4 mg, Intravenous, Q6H PRN, RADHA Omalley - CNP    magnesium sulfate 1 g in dextrose 5% 100 mL IVPB, 1 g, Intravenous, PRN, Hernan Moreno, APRN - CNP    potassium chloride (KLOR-CON M) extended release tablet 40 mEq, 40 mEq, Oral, PRN **OR** potassium bicarb-citric acid (EFFER-K) effervescent tablet 40 mEq, 40 mEq, Oral, PRN **OR** potassium chloride 10 mEq/100 mL IVPB needed. Recommended daily laxatives to prevent constipation. Blood cultures final results are pending. I doubt the fever to be of infectious origin. Most probably this is a tumor fever. Continue anticoagulation with Eliquis. Follow-up Dr. Winsome Myers on Wednesday as previously planned.       Electronically signed by Frank Pruett MD on 1/6/2020 at 5:06 PM

## 2020-01-06 NOTE — PROGRESS NOTES
Date:2020  Patient Name: Leonora Hernández  MRN: [id number]  : 1960  ROOM #: [id number]    Occupational Therapy order received, chart reviewed and evaluation attempted this date. Patient is unavailable for OT evaluation due to eating lunch. Will attempt OT evaluation at a later time. Thank you.    Jennie Soria OTR/L #[id number]

## 2020-01-06 NOTE — PROGRESS NOTES
PROGRESS NOTE  By Abhay Bob M.D. [facility]  Dr. Catalina Hester M.D.,  Dr. Saul Perera M.D.,   Dr. Bettie Zambrano D.O.,  Dr. Roselyn Riley M.D.,  Dr Cindi Foley, Chantal Munoz Vincent  63 y.o.  male    SUBJECTIVE:  Persistent left-sided pain with deep inspiration d pain    OBJECTIVE:    BP (!) 108/55   Pulse 85   Temp 99 °F (37.2 °C) (Oral)   Resp 18   Ht 5' 10\" (1.778 m)   Wt 210 lb 3.2 oz (95.3 kg)   SpO2 93%   BMI 30.16 kg/m²     General: NAD/ male  HEENT: Anicteric sclera/moist oral mucosa  Neck: Supple/trachea midline  Chest: Symmetrical excursion/now with respirations  Abd. Obese and nontender:  Extr.:  No peripheral edema  Skin: Warm and dry. Anicteric        DATA:    Monitor data reviewed -sinus rhythm noted. Lab Results   Component Value Date    WBC 8.7 01/06/2020    RBC 3.06 01/06/2020    HGB 8.4 01/06/2020    HCT 27.7 01/06/2020    MCV 90.5 01/06/2020    MCH 27.5 01/06/2020    MCHC 30.3 01/06/2020    RDW 17.1 01/06/2020     01/06/2020    MPV 9.6 01/06/2020     Lab Results   Component Value Date     01/06/2020    K 3.8 01/06/2020     01/06/2020    CO2 23 01/06/2020    BUN 6 01/06/2020    CREATININE 0.7 01/06/2020    CALCIUM 8.3 01/06/2020    PROT 6.0 01/06/2020    LABALBU 2.6 01/06/2020    BILITOT 0.5 01/06/2020    ALKPHOS 161 01/06/2020    AST 35 01/06/2020    ALT 21 01/06/2020     Lab Results   Component Value Date    LIPASE 5 01/05/2020     Lab Results   Component Value Date    AMYLASE 140 05/19/2018         ASSESSMENT/PLAN:  1. Pancreatic cancer with metastatic disease  -Poor prognosis but no evidence of biliary obstruction  -Pneumobilia likely secondary to previous instrumentation  -Further management per oncology     2.  Splenic abnormality  -Possible infarction -precipitated by pancreatic cancer  -Patient currently anticoagulated - DOAC    3.   Anemia   -stable H&H without evidence of overt bleed  -Return H&H       NOTE:  This

## 2020-01-06 NOTE — PROGRESS NOTES
[address] [place] [address] [facility]  Progress Note    NAME:Kip Vincent  DATE:20    F/U: fevers    SUBJECTIVE:  Chief Complaint   Patient presents with    Abdominal Pain     left lower quadrant pain states he was moving boxes with his son a few days ago and ever since has been having pain. denies urinary/ bowel changes, pain in LLQ is sharp 10/10 with deep inhalation   pt has no fevers  Patient is tolerating medications. No reported adverse drug reactions. No FEVERS, CHILLS, nausea, vomiting, diarrhea. Review of systems:  As stated above in the chief complaint, otherwise negative. Medications:  Scheduled Meds:   apixaban  5 mg Oral BID    escitalopram  5 mg Oral Daily    gabapentin  300 mg Oral BID    pantoprazole  40 mg Oral Daily    budesonide  0.5 mg Nebulization BID    formoterol  20 mcg Nebulization BID    sodium chloride flush  10 mL Intravenous 2 times per day    vancomycin  20 mg/kg Intravenous Q12H    piperacillin-tazobactam  3.375 g Intravenous Q8H    aspirin  81 mg Oral Daily    lipase-protease-amylase  6 capsule Oral TID WC     Continuous Infusions:   IV infusion builder 100 mL/hr at 20 0017     PRN Meds:LORazepam, sennosides-docusate sodium, morphine, sodium chloride flush, magnesium hydroxide, ondansetron, magnesium sulfate, potassium chloride **OR** potassium alternative oral replacement **OR** potassium chloride, albuterol, acetaminophen, oxyCODONE    OBJECTIVE:  /63   Pulse 85   Temp 100.5 °F (38.1 °C) (Oral)   Resp 16   Ht 5' 10\" (1.778 m)   Wt 210 lb 3.2 oz (95.3 kg)   SpO2 92%   BMI 30.16 kg/m²   Temp  Av.4 °F (37.4 °C)  Min: 98.2 °F (36.8 °C)  Max: 100.5 °F (38.1 °C)  Constitutional:  The patient is awake, alert, and oriented. Skin:    Warm and dry. No rashes were noted. HEENT:   Round and reactive pupils. AT/NC  Neck:    Supple to movements. Chest:   No use of accessory muscles to breathe. Symmetrical expansion.   No wheezing, crackles or rhonchi. Cardiovascular:  S1 and S2 are rhythmic and regular. No murmurs appreciated. Abdomen:   Positive bowel sounds to auscultation. Benign to palpation. No masses felt. No hepatosplenomegaly. Extremities:   No clubbing, no cyanosis, no edema. CNS    AAxO   Lines: port    Radiology:  Laboratory and Tests Review:  Lab Results   Component Value Date    WBC 8.7 01/06/2020    WBC 12.0 (H) 01/05/2020    WBC 3.5 (L) 11/10/2019    HGB 8.4 (L) 01/06/2020    HCT 27.7 (L) 01/06/2020    MCV 90.5 01/06/2020     01/06/2020     Lab Results   Component Value Date    NEUTROABS 9.91 (H) 01/05/2020    NEUTROABS 6.60 11/08/2019    NEUTROABS 1.64 (L) 08/08/2019     No results found for: [facility]  Lab Results   Component Value Date    ALT 21 01/06/2020    AST 35 01/06/2020    ALKPHOS 161 (H) 01/06/2020    BILITOT 0.5 01/06/2020     Lab Results   Component Value Date     01/06/2020    K 3.8 01/06/2020     01/06/2020    CO2 23 01/06/2020    BUN 6 01/06/2020    CREATININE 0.7 01/06/2020    CREATININE 0.7 01/05/2020    CREATININE 0.7 11/10/2019    GFRAA >60 01/06/2020    LABGLOM >60 01/06/2020    GLUCOSE 99 01/06/2020    PROT 6.0 01/06/2020    LABALBU 2.6 01/06/2020    CALCIUM 8.3 01/06/2020    BILITOT 0.5 01/06/2020    ALKPHOS 161 01/06/2020    AST 35 01/06/2020    ALT 21 01/06/2020     Lab Results   Component Value Date    CRP 18.9 (H) 01/05/2020    CRP 8.3 (H) 08/06/2019    CRP 15.7 (H) 07/19/2019     Lab Results   Component Value Date    SEDRATE 69 (H) 01/05/2020    SEDRATE 85 (H) 07/19/2019    SEDRATE 55 (H) 05/02/2019       Microbiology:   Lab Results   Component Value Date    [facility]  11/08/2019     This organism possesses an Extended Spectrum Beta Lactamase  that is inhibited by Clavulanic Acid.       BC 5 Days- no growth 08/06/2019    BC 5 Days- no growth 08/05/2019    ORG Klebsiella pneumoniae ssp pneumoniae 11/08/2019    ORG Escherichia coli 07/18/2019    ORG Klebsiella pneumoniae ssp treatment. An opportunity to ask questions was given to the patient and questions were answered.         Electronically signed by Anu Turner MD on 1/6/2020 at 8:48 AM

## 2020-01-06 NOTE — PROGRESS NOTES
Pharmacy Consultation Note  (Antibiotic Dosing and Monitoring)    Initial consult date: 20  Consulting physician: Mona Bah  Drug(s): Vancomycin  Indication: Sepsis/Pneumonia    Ht Readings from Last 1 Encounters:   20 5' 10\" (1.778 m)     Wt Readings from Last 1 Encounters:   20 210 lb 3.2 oz (95.3 kg)       Age/  Gender IBW DW  Allergy Information   63 y.o.     male 73 kg 94.3 kg  Rivaroxaban                 Date  WBC BUN/CR Drug/Dose Time   Given Level(s)   (Time) Comments    12.0 9/0.7 Vancomycin 2250mg IV x1    Vancomycin 2000mg IV Q12H 1103    2239      8.7 6/0.7 Vancomycin 2000mg IV Q12H 1012  <2300> <2230> Hold dose if trough is >20 mcg/mL                       Estimated Creatinine Clearance: 133 mL/min (based on SCr of 0.7 mg/dL).       Intake/Output Summary (Last 24 hours) at 2020 1209  Last data filed at 2020 0840  Gross per 24 hour   Intake 920 ml   Output --   Net 920 ml       Temp max: Temp (24hrs), Av.4 °F (37.4 °C), Min:98.2 °F (36.8 °C), Max:100.5 °F (38.1 °C)      Antibiotic Start Stop   Zosyn 3.375g IV Q8H              Cultures:  available culture and sensitivity results were reviewed in Epic  · Legionella/Strep pneumo urine ag: negative  · Urine: gram positive organism (<10,000 cfu)  · Resp panel: negative      Assessment:  · Patient is a 63year old male who presented with abdominal pain (Hx of pancreatic adenocarcinoma)  · Abdominal CT showing hepatic metastasis, ascites, and bilateral lower airspace disease  · Goal trough level:  15-20 mcg/mL    Plan:  · Continue Vancomycin 2,000 mg IV Q12H  · Check trough tonight @ 2230, please draw trough 30 minutes prior to 2300 dose, hold dose only if trough is >20 mcg/mL  · Pharmacist will follow and monitor/adjust dosing as necessary      Thank you for the consult,    Guru Whitten PharmD, [facility] 12:10 PM   [phone]

## 2020-01-06 NOTE — PROGRESS NOTES
Occupational Therapy  Occupational Therapy Initial Assessment    Date:2020  Patient Name: Kitty Frausto  MRN: [id number]  : 1960  ROOM #: [id number]    Placement Recommendation: Home with no skilled occupational therapy needed after discharge from inpatient. Equipment Prescriptions Needed: none     [facility]   AM-PAC Daily Activity Inpatient   How much help for putting on and taking off regular lower body clothing?: None  How much help for Bathing?: None  How much help for Toileting?: None  How much help for putting on and taking off regular upper body clothing?: None  How much help for taking care of personal grooming?: None  How much help for eating meals?: None  AM-PAC Inpatient Daily Activity Raw Score: 24  AM-PAC Inpatient ADL T-Scale Score : 57.54  ADL Inpatient CMS 0-100% Score: 0  ADL Inpatient CMS G-Code Modifier : CH    Diagnosis:   1. Sepsis, due to unspecified organism, unspecified whether acute organ dysfunction present ([address]    2. HCAP (healthcare-associated pneumonia)    3. NSTEMI (non-ST elevated myocardial infarction) ([address]    4. Metastasis from pancreatic cancer (HCC)    5. Other ascites    6. Splenic infarct      Past Medical History:   Past Medical History:   Diagnosis Date    Anticoagulant long-term use     Anxiety     Back pain     Cancer ([address] 2018    pancreatic cancer    DDD (degenerative disc disease), lumbar 10/21/2013    DVT     Hx of blood clots     Hyperglyceridemia, pure 10/8/2019    Neutropenic fever ([address] 2018    Panic attacks     Sleep apnea     Unspecified sleep apnea          The admitting diagnosis and active problem list as listed above have been reviewed prior to the initiation of this evaluation. Nursing cleared the patient for evaluation. Patient is agreeable to evaluation. Precautions: falls, CA with mets, contact isolation  Pain Scale: Numeric Rate: no c/o pain; Nursing notified.     Social history: with family: daughter and mom Drive: yes  Home architecture: single family home, 1 story, 2 steps to enter with rail, walk in shower. PLOF: independent with BADL and IADL, ambulated with no device   Equipment owned: wheelchair, cane, wheeled walker, bedside commode, shower chair, grab bars    Cognition: oriented x 4; follows 3 step directions. good  Problem solving skills   good  Memory    good  Sequencing  Communication: intact   Visual perceptual skills: intact     Glasses: yes   Edema: no    Sensation: intact   Hand Dominance:  Left     X  Right     Left Right Comment   Passive range of motion [facility]     Active range of motion [facility]     Muscle Grade 4/5 4/5    /pinch Strength Intact  Intact     [] Malnutrition indicators have been identified and nursing has been notified to ensure a dietitian consult is ordered. Function Assessment    Status  Goal Comment   Feeding:  Independent   Independent      Grooming:  Independent  Independent      Toileting: Independent  Independent     UE dressing:  Independent  Independent     LE dressing:  Independent  Independent     Bathing:  Independent  Independent     Bed Mobility:     Independent  for supine to sit,   Independent  for scooting,  Independent  for sit to supine  Independent     Functional Transfers:    Independent  for sit to stand transfer from EOB and to and from low commode  Independent  Safety: good    Functional Mobility: 80 feet with no device and  Independent  Independent       Pt/family participated in establishment of goals.      Balance:   Sitting: good   Standing: good with no device    Endurance: good       Assessment of Current Deficits: NONE   []Functional mobility  []ROM  []Strength   []Cognition   []Safety Awareness    []ADLs []IADLs   []Endurance  []Balance    []Vision  []Sensation     []Gross Motor Coordination       []Fine Motor Coordination     · Low Complexity  · History: Brief history including review of medical records relating to the problem  · Exam:

## 2020-01-06 NOTE — PROGRESS NOTES
01/06/2020    LABALBU 2.6 01/06/2020    CREATININE 0.7 01/06/2020    CALCIUM 8.3 01/06/2020    GFRAA >60 01/06/2020    LABGLOM >60 01/06/2020    GLUCOSE 99 01/06/2020       Assessment:   1. Sepsis secondary to healthcare associated pneumonia  2. Abdominal pain with evidence of worsening hepatic metastasis, metastatic adenopathy and air within the hepatic and bile ducts with newly identified splenic infarct  3. Chronically elevated troponin  4. Worsening normocytic normochromic anemia on chronic anticoagulation with Eliquis   5. Pancreatic adenocarcinoma, on every other Wednesday chemotherapy  6. History of E. coli septicemia  7. Anxiety  8. Sleep apnea  9. Distant history of DVT and PE, on Eliquis    Plan:   The patient's abdominal pain has improved dramatically today. He continues to have low-grade febrile illness. He is maintained on IV antibiotic therapy as we await final panculture results. The febrile illness may be the result of the underlying tumor burden. The patient is extremely anxious for discharge home but understands the importance of maintaining hospitalization. Pending final culture results, the patient will be acceptable for discharge home with follow-up appointment with the oncology team this Wednesday. Continue current therapy. See orders for further plan of care. More than 50% of my  time was spent at the bedside counseling/coordinating care with the patient and/or family with face to face contact. This time was spent reviewing notes and laboratory data as well as instructing and counseling the patient. Time I spent with the family or surrogate(s) is included only if the patient was incapable of providing the necessary information or participating in medical decisions. I also discussed the differential diagnosis and all of the proposed management plans with the patient and individuals accompanying the patient.     Milly Joseph requires this high level of physician care and nursing on the IMC/Telemetry unit due the complexity of decision management and chance of rapid decline or death. Continued cardiac monitoring and higher level of nursing are required. I am readily available for any further decision-making and intervention.        Gilbreto Varghese DO, F.A.C.O.I.  1/6/2020  9:42 AM

## 2020-01-06 NOTE — PROGRESS NOTES
Room #:   4173/0897-93  Patient Name: Kitty Frausto    PHYSICAL THERAPY INITIAL EVALUATION    Tentative placement recommendation: Home    Equipment recommendation: None      Prior Level of Function: Patient ambulated independently    Rehab Potential: good       Plan of care: Patient will be seen twice daily;  for therapeutic exercise, functional retraining, endurance activities, balance exercises, family and patient education. Nursing to ambulate patient every shift. AM-PAC Basic Mobility        AM-PAC Mobility Inpatient   How much difficulty turning over in bed?: None  How much difficulty sitting down on / standing up from a chair with arms?: A Little  How much difficulty moving from lying on back to sitting on side of bed?: None  How much help from another person moving to and from a bed to a chair?: A Little  How much help from another person needed to walk in hospital room?: A Little  How much help from another person for climbing 3-5 steps with a railing?: A Little  AM-PAC Inpatient Mobility Raw Score : 20  AM-PAC Inpatient T-Scale Score : 47.67  Mobility Inpatient CMS 0-100% Score: 35.83  Mobility Inpatient CMS G-Code Modifier : CJ    Order:  EVALUATE AND TREAT    Diagnosis/Problem list:    1. Sepsis, due to unspecified organism, unspecified whether acute organ dysfunction present ([facility] [address]    2. HCAP (healthcare-associated pneumonia)    3. NSTEMI (non-ST elevated myocardial infarction) ([facility] [address]    4. Metastasis from pancreatic cancer (HCC)    5. Other ascites    6.  Splenic infarct        Patient Active Problem List   Diagnosis    DDD (degenerative disc disease), lumbar    Protruded lumbar disc    Neural foraminal stenosis of lumbar spine    Lumbar radiculopathy    Degenerative Osteoarthritis of lumbar spine    Facet syndrome, lumbar    Neutropenic fever (HCC)    Fever    Generalized OA    Hyperglyceridemia, pure    Liver mass    OVIDIO on CPAP    Sepsis (HCC)       Past medical history: Transfers-Sit to stand- Supervision      Gait:  Patient ambulated 80 feet using no device with Supervision      Steps:  Not assessed      Balance: sit-good        stand fair      Edema: none noted   Endurance: fair       Treatment:  Therapist educated and facilitated patient on techniques to increase safety and independence with bed mobility, balance, functional transfers, and functional mobility. Sat EOB x 10 minutes to increase dynamic sitting balance and activity tolerance. Patient ambulated to/from bathroom, performed commode transfer with supervision,  ambulated in room due to isolation, returned to bed with family present. Pulse ox 97%  Patient demonstrating fair   understanding of education/techniques, requiring additional training/education. Skilled monitoring of heartrate, oxygen saturation and patients response to treatment. At end of session, patient in bed with  call light and phone within reach,   all lines and tubes intact, nursing notified. Pt would benefit from continued skilled PT to increase functional independence and quality of life. Patients Goal: home      ASSESSMENT  Patient exhibits decreased strength, balance, coordination impairing functional mobility. GOALS to be met in 1 days. Bed mobility-  Independent        Transfers-Sit to stand-Independent     Gait:  Patient to ambulate 100 feet   Independent     Steps: Patient to go up and down 2  step(s) using 1 rail(s) with  Supervision         Increase strength in affected mm groups by 1/3 grade  Increase balance to allow for improvement towards functional goals. Increase endurance to allow for improvement towards functional goals. Evaluation time includes  review of current medical information, gathering information on past medical history/social history and prior level of function, completion of standardized testing/informal observation of tasks, assessment of data, and development of Plan of Care/Goals. Ana Saul, PT

## 2020-01-07 NOTE — PROGRESS NOTES
[address] [place] [address] [facility]  Progress Note    NAME:Kip Vincent  DATE:20    F/U: fevers    SUBJECTIVE:  Chief Complaint   Patient presents with    Abdominal Pain     left lower quadrant pain states he was moving boxes with his son a few days ago and ever since has been having pain. denies urinary/ bowel changes, pain in LLQ is sharp 10/10 with deep inhalation   pt has no fevers  Feeling better  Patient is tolerating medications. No reported adverse drug reactions. No FEVERS, CHILLS, nausea, vomiting, diarrhea. Review of systems:  As stated above in the chief complaint, otherwise negative. Medications:  Scheduled Meds:   morphine  15 mg Oral 2 times per day    piperacillin-tazobactam  3.375 g Intravenous Q8H    apixaban  5 mg Oral BID    escitalopram  5 mg Oral Daily    gabapentin  300 mg Oral BID    pantoprazole  40 mg Oral Daily    budesonide  0.5 mg Nebulization BID    formoterol  20 mcg Nebulization BID    sodium chloride flush  10 mL Intravenous 2 times per day    vancomycin  20 mg/kg Intravenous Q12H    aspirin  81 mg Oral Daily    lipase-protease-amylase  6 capsule Oral TID WC     Continuous Infusions:   sodium chloride Stopped (20 0903)    0.9% NaCl with KCl 40 mEq 75 mL/hr at 20 0740     PRN Meds:LORazepam, sennosides-docusate sodium, morphine, sodium chloride flush, magnesium hydroxide, ondansetron, magnesium sulfate, potassium chloride **OR** potassium alternative oral replacement **OR** potassium chloride, albuterol, acetaminophen, oxyCODONE    OBJECTIVE:  BP (!) 110/58   Pulse 84   Temp 98.4 °F (36.9 °C) (Oral)   Resp 18   Ht 5' 10\" (1.778 m)   Wt 210 lb 6.4 oz (95.4 kg)   SpO2 93%   BMI 30.19 kg/m²   Temp  Av.4 °F (37.4 °C)  Min: 98.4 °F (36.9 °C)  Max: 99.9 °F (37.7 °C)  Constitutional:  The patient is awake, alert, and oriented. Skin:    Warm and dry. No rashes were noted. HEENT:   Round and reactive pupils. AT/NC  Neck:    Supple to movements. Chest:   No use of accessory muscles to breathe. Symmetrical expansion. No wheezing, crackles or rhonchi. Cardiovascular:  S1 and S2 are rhythmic and regular. No murmurs appreciated. Abdomen:   Positive bowel sounds to auscultation. Benign to palpation. No masses felt. No hepatosplenomegaly. Extremities:   No clubbing, no cyanosis, no edema. CNS    AAxO   Lines: port right    Radiology:  Laboratory and Tests Review:  Lab Results   Component Value Date    WBC 8.0 01/07/2020    WBC 8.7 01/06/2020    WBC 12.0 (H) 01/05/2020    HGB 8.2 (L) 01/07/2020    HCT 27.7 (L) 01/07/2020    MCV 89.9 01/07/2020     01/07/2020     Lab Results   Component Value Date    NEUTROABS 9.91 (H) 01/05/2020    NEUTROABS 6.60 11/08/2019    NEUTROABS 1.64 (L) 08/08/2019     No results found for: [facility]  Lab Results   Component Value Date    ALT 18 01/07/2020    AST 34 01/07/2020    ALKPHOS 168 (H) 01/07/2020    BILITOT 0.7 01/07/2020     Lab Results   Component Value Date     01/07/2020    K 4.1 01/07/2020     01/07/2020    CO2 23 01/07/2020    BUN 6 01/07/2020    CREATININE 0.8 01/07/2020    CREATININE 0.7 01/06/2020    CREATININE 0.7 01/05/2020    GFRAA >60 01/07/2020    LABGLOM >60 01/07/2020    GLUCOSE 95 01/07/2020    PROT 6.2 01/07/2020    LABALBU 2.6 01/07/2020    CALCIUM 8.1 01/07/2020    BILITOT 0.7 01/07/2020    ALKPHOS 168 01/07/2020    AST 34 01/07/2020    ALT 18 01/07/2020     Lab Results   Component Value Date    CRP 18.9 (H) 01/05/2020    CRP 8.3 (H) 08/06/2019    CRP 15.7 (H) 07/19/2019     Lab Results   Component Value Date    SEDRATE 69 (H) 01/05/2020    SEDRATE 85 (H) 07/19/2019    SEDRATE 55 (H) 05/02/2019       Microbiology:   Lab Results   Component Value Date    BC 24 Hours- no growth 01/05/2020    BC  11/08/2019     This organism possesses an Extended Spectrum Beta Lactamase  that is inhibited by Clavulanic Acid.       BC 5 Days- no growth 08/06/2019    ORG Klebsiella pneumoniae ssp pneumoniae 11/08/2019    ORG Escherichia coli 07/18/2019    ORG Klebsiella pneumoniae ssp pneumoniae 06/10/2019     Lab Results   Component Value Date    BLOODCULT2 24 Hours- no growth 01/05/2020    BLOODCULT2 5 Days- no growth 11/08/2019    BLOODCULT2 5 Days- no growth 08/06/2019    ORG Klebsiella pneumoniae ssp pneumoniae 11/08/2019    ORG Escherichia coli 07/18/2019    ORG Klebsiella pneumoniae ssp pneumoniae 06/10/2019        Urine Culture, Routine   Date Value Ref Range Status   01/05/2020 <10,000 CFU/mL  Gram positive organism    Preliminary   11/08/2019 Growth not present  Final   08/05/2019 Growth not present  Final          Lab Results   Component Value Date    BLOODCULT2 24 Hours- no growth 01/05/2020    ORG Klebsiella pneumoniae ssp pneumoniae 11/08/2019    ORG Escherichia coli 07/18/2019    ORG Klebsiella pneumoniae ssp pneumoniae 06/10/2019       ASSESSMENT: ON ADMISSION PT HAD   Chief Complaint   Patient presents with    Abdominal Pain     left lower quadrant pain states he was moving boxes with his son a few days ago and ever since has been having pain. denies urinary/ bowel changes, pain in LLQ is sharp 10/10 with deep inhalation      This is a 63year old male with PMH: pancreatic cancer May 2018 for which he underwent a Whipple procedure and then subsequently chemotherapy but was recently diagnosed with metastatic liver cancer. He presented to the ER with left upper quadrant abdominal pain, nausea, vomiting, and decreased appetite. CT on admission revealed liver mets and bilateral lower airspace disease for which ID is consulted.      fevers follow better  Leukocytosis better    · PLAN:   · Continue watch off atbx  · Check final cultures  · Monitor labs  Pt to f/u with  [facility]   For new trial  Imaging and labs were reviewed per medical records and any ID pertinent labs were addressed with the patient.    The patient was educated about the diagnosis, prognosis, indications, risks and benefits of treatment. An opportunity to ask questions was given to the patient and questions were answered.         Electronically signed by John Mon MD on 1/7/2020 at 1:18 PM

## 2020-01-07 NOTE — PROGRESS NOTES
Physical Therapy  Physical Therapy  Daily Treatment Note  1/7/2020  [id number]                      Soledad Stark   [id number]                              1960    Patient Active Problem List   Diagnosis    DDD (degenerative disc disease), lumbar    Protruded lumbar disc    Neural foraminal stenosis of lumbar spine    Lumbar radiculopathy    Degenerative Osteoarthritis of lumbar spine    Facet syndrome, lumbar    Neutropenic fever (HCC)    Fever    Generalized OA    Hyperglyceridemia, pure    Liver mass    OVIDIO on CPAP    Sepsis ([facility] [address]       Recommendation for discharge: Home  Equipment prescriptions needed: none    AM-[facility] Mobility Inpatient   How much difficulty turning over in bed?: None  How much difficulty sitting down on / standing up from a chair with arms?: A Little  How much difficulty moving from lying on back to sitting on side of bed?: None  How much help from another person moving to and from a bed to a chair?: A Little  How much help from another person needed to walk in hospital room?: A Little  How much help from another person for climbing 3-5 steps with a railing?: A Little  AM-[facility] Inpatient Mobility Raw Score : 20  AM-PAC Inpatient T-Scale Score : 47.67  Mobility Inpatient CMS 0-100% Score: 35.83  Mobility Inpatient CMS G-Code Modifier : CJ      Precautions: falls and contact isolation    S: Patient cleared by nursing for treatment. Patient is agreeable to treatment. Pt. Sitting at eob. Pain status: (measured on a visual analog scale with 0=no pain and 10=excruciating pain) 0/10. O: Pt was instructed in and performed the following:   Bed Mobility- Supine to sit-Independent     Scooting- Independent     Sit to supine-na   Transfers-sit to stand- Supervision   Gait:  Patient ambulated 2 x 25 feet using no device, holding onto IV pole with Supervision. Steps:  na  Treatment: Pt.  Ambulated in room and stood at eob and performed heel raises, mini-squats, and marching x 10 reps, performed seated ankle pumps, long arc quad,  x 10 reps. Remains at eob. Comment: Call light left by patient. A: Jake. Well, motivated to ambulate, states he ambulates to the bathroom Independently. P: Continue with physical therapy   Elder Archdale, PTA    GOALS to be met in 1 days. Bed mobility-  Independent                                         Transfers-Sit to stand-Independent                Gait:  Patient to ambulate 100 feet   Independent                Steps: Patient to go up and down 2  step(s) using 1 rail(s) with  Supervision           Increase strength in affected mm groups by 1/3 grade  Increase balance to allow for improvement towards functional goals. Increase endurance to allow for improvement towards functional goals.

## 2020-01-07 NOTE — PROGRESS NOTES
S: Patient using morphine prn pain or oxycodone, left upper quadrant pain a little better but taking breakthrough frequently, no nausea/vomiting, appetitie good  O:  Temp 98.4 R-18 P-84  Gen- awake, well nourished in appearance, no distress  HEENT-mmm  Lungs- clear  Cardiac- RRR  mediport site clean  Abd- soft/nt/nd +BS  Ext- warm and well perfused    Lab s:  Wbc=8 hb=8 exd=720    A/P:  Patient is 52-year-old gentleman with history of metastatic pancreatic cancer diagnosed in June 2019 and who is followed at the [facility] by Dr. Brook Alfonso he was diagnosed with early stage disease status post Whipple resection in September 2018, treated with 11 cycles of adjuvant chemotherapy with FOLFIRINO completed in May 2019. .     He was started on palliative chemotherapy with gemcitabine and Abraxane in June 2019 for progression on new baseline CT scans after adjuvant chemotherapy.  Last treatment was 12/18/2019. Patient admieted with abdominal left upper quadrant  Pain appears due to splenic infarct. He was found with fever. Cultures negative and fever improved. The CT scan of abdomen pelvis done today and compared to CT done September 2019 showed significant interval worsening of hepatic metastatic disease as well as enlargement of upper abdominal lymph nodes.  Possible splenic infarct.     Plan: Add ms contin 15mg bid, can take oral oxycodone prn  OK to discharge from my standpoint  F/U with me tomorrow  We discussed cancer progression. I am in contact with [facility] about clinical trial that may open later this month to see if he is a candidate, if not then will plan liposomal irinotecan/5fu off study.     Mihir Coronado

## 2020-01-07 NOTE — PROGRESS NOTES
[facility] Quality Flow/Interdisciplinary Rounds Progress Note        Quality Flow Rounds held on January 7, 2020    Disciplines Attending:  Bedside Nurse, ,  and Nursing Unit Leadership    Gian Jurado was admitted on 1/5/2020  7:00 AM    Anticipated Discharge Date:  Expected Discharge Date: 01/08/20    Disposition:    Cali Score:  Cali Scale Score: 21    Readmission Risk              Risk of Unplanned Readmission:        27           Discussed patient goal for the day, patient clinical progression, and barriers to discharge.   The following Goal(s) of the Day/Commitment(s) have been identified:  Possible DC, Port, IV F, IV ATX, SR,RA      Comfort Sen  January 7, 2020

## 2020-01-07 NOTE — DISCHARGE SUMMARY
evaluated in the emergency department initially where his pain was addressed with symptomatic therapy. His CT scan of the abdomen pelvis was performed with contrast due to the patient's history of pancreatic adenocarcinoma and shows interval worsening of the hepatic metastasis with metastatic adenopathy and also some questionable air within the hepatic and bile ducts. He does have a history of recurrent E. coli bacteremia felt to be secondary to biliary obstruction in the past.  He was without jaundice, icterus or fever. IV fluid hydration was undertaken. Laboratory evaluation showed a leukocytosis and interval worsening of normocytic normochromic anemia. Platelet count was within normal range. Metabolic panel showed no significant electrolyte derangement or renal insufficiency. Troponin is mildly elevated without any EKG changes superimposed to suggest acute infarction or acute ischemia. Echo was performed and showed right basilar infiltrate and patient met criteria for sepsis based on vital signs criteria with leukocytosis. Patient was noted on broad-spectrum antibiotic therapy after blood cultures were obtained and patient was admitted to the service of Dr. Manuel Hatch.     LABS[de-identified]  Lab Results   Component Value Date    WBC 8.0 01/07/2020    HGB 8.2 (L) 01/07/2020    HCT 27.7 (L) 01/07/2020     01/07/2020     01/07/2020    K 4.1 01/07/2020     01/07/2020    CREATININE 0.8 01/07/2020    BUN 6 01/07/2020    CO2 23 01/07/2020    GLUCOSE 95 01/07/2020    ALT 18 01/07/2020    AST 34 01/07/2020    INR 2.0 01/06/2020     Lab Results   Component Value Date    INR 2.0 01/06/2020    INR 4.4 02/08/2019    INR 1.7 02/04/2019    PROTIME 22.8 (H) 01/06/2020    PROTIME 17.0 (H) 11/19/2018    PROTIME 15.0 (H) 11/18/2018      Lab Results   Component Value Date    TSH 2.570 05/02/2019     Lab Results   Component Value Date    TRIG 85 05/02/2019    TRIG 52 11/14/2018     Lab Results   Component Value Date    HDL JVD. No bruits. Heart:  Rhythm regular, rate controlled. No murmurs. Lungs:  Symmetrical. Clear to auscultation bilaterally. No wheezes. No rhonchi. No rales. Abdomen: Soft. Obese abdominal contour. Non-tender. Non-distended. Bowel sounds positive. No organomegaly or masses. No pain on palpation    Extremities:  Peripheral pulses present. No peripheral edema. No ulcers. Neurologic:  Alert x 3. No focal deficit. Cranial nerves grossly intact. Skin:  No petechia. No hemorrhage. No wounds. DISPOSITION:  The patient's condition is good. At this time the patient is without objective evidence of an acute process requiring continuing hospitalization or inpatient management. They are stable for discharge with outpatient follow-up. I have spoken with the patient and discussed the results of the current hospitalization, in addition to providing specific details for the plan of care and counseling regarding the diagnosis and prognosis. The plan has been discussed in detail and they are aware of the specific conditions for emergent return, as well as the importance of follow-up. Their questions are answered at this time and they are agreeable with the plan for discharge to home    DISCHARGE MEDICATIONS:    [facility] Medication Instructions [facility]:[id number]    Printed on:01/07/20 1018   Medication Information                      albuterol sulfate  (90 Base) MCG/ACT inhaler  Inhale 2 puffs into the lungs 4 times daily as needed for Wheezing             apixaban (ELIQUIS) 5 MG TABS tablet  Take 5 mg by mouth 2 times daily             aspirin 81 MG chewable tablet  Take 1 tablet by mouth daily             diphenoxylate-atropine (LOMOTIL) 2.5-0.025 MG per tablet  Take 1 tablet by mouth 4 times daily as needed for Diarrhea. .             escitalopram (LEXAPRO) 10 MG tablet  Take 5 mg by mouth daily              ferrous sulfate 325 (65 Fe) MG tablet  Take 325 mg by mouth daily (with breakfast)              gabapentin (NEURONTIN) 300 MG capsule  Take 300 mg by mouth 2 times daily. ibuprofen (ADVIL;MOTRIN) 400 MG tablet  Take 1 tablet by mouth every 6 hours as needed for Fever             lidocaine-prilocaine (EMLA) 2.5-2.5 % cream  Apply 1 each topically as needed for Pain (Prior to chemotherapy) Apply topically as needed. LORazepam (ATIVAN) 0.5 MG tablet  Take 0.5 mg by mouth 2 times daily as needed for Anxiety. morphine (MS CONTIN) 15 MG extended release tablet  Take 1 tablet by mouth 2 times daily for 3 days. oxyCODONE (ROXICODONE) 5 MG immediate release tablet  Take 1 tablet by mouth every 6 hours as needed for Pain for up to 3 days. Pancrelipase, Lip-Prot-Amyl, (CREON) 51687 units CPEP  Take 2 capsules by mouth 3 times daily (with meals)              pantoprazole (PROTONIX) 40 MG tablet  Take 40 mg by mouth daily             promethazine (PHENERGAN) 25 MG tablet  Take 25 mg by mouth every 6 hours as needed for Nausea             senna-docusate (PERICOLACE) 8.6-50 MG per tablet  Take 1 tablet by mouth daily as needed for Constipation              vitamin B-12 (CYANOCOBALAMIN) 1000 MCG tablet  Take 1,000 mcg by mouth daily                 FOLLOW UP/INSTRUCTIONS:  · This patient is instructed to follow-up with his primary care physician. · Patient is instructed to follow-up with the consults listed above as directed by them. · he is instructed to resume home medications and take new medications as indicated in the list above. · If the patient has a recurrence of symptoms, he is instructed to go to the ED. Preparing for this patient's discharge, including paperwork, orders, instructions, and meeting with patient did require > 30 minutes.     RADHA Schumacher CNP     1/7/2020  10:18 AM

## 2020-02-07 PROBLEM — I26.99 ACUTE PULMONARY EMBOLISM WITHOUT ACUTE COR PULMONALE (HCC): Status: ACTIVE | Noted: 2020-01-01

## 2020-02-07 NOTE — ED PROVIDER NOTES
Patient is a 72-year-old male with a past medical history of hyperglycemia, pancreatic cancer with metastasis, DVT, splenic infarct presenting to emergency department for fatigue. Previously admitted on 1/5 and discharged on 1/11 from sepsis secondary to pneumonia, splenic infarct, and STEMI. Patient states he felt somewhat improved after leaving from the hospital stay but fatigue is just greatly increased. He did start a new round of chemotherapy last Wednesday and he thinks his fatigue does increase since then. He was having some abdominal distention and had a therapeutic paracentesis performed on Tuesday. This has helped his abdominal distention and the abdominal pain that he was not having. He is still having edema in his left lower extremity. He states that has been ongoing for the last 4 to 5 days. He does have a history of blood clots in his legs as well as in his spleen. Patient states he was previously on blood thinning medication but has stopped those sometime mid-January 2/2 blood in his stools not been restarted on it. He does not wear any oxygen at home but is requiring 2 L initially in the department. He states he is very weak and is unable to ambulate around the house without help. He states he had a decreased appetite and not wanting to eat any foods. He is tolerating liquids and has been drinking Pedialyte. He states he does not feel nauseous and has not vomited at home. He is having diarrhea, but they told him this was secondary to the chemotherapy. Denies any abdominal pain at this time. He states he feels short of breath, but is not coughing. He recently followed up in [facility] for possible clinical trials but was denied secondary to his albumin level. Patient is tried nothing at home, nothing makes it better and moving and exertion makes his symptoms worse. He has not having any specific location of pain right now. He denies any fevers.            Review of Systems There is no abdominal tenderness. There is no guarding or rebound. Musculoskeletal:         General: Swelling present. No tenderness or signs of injury. Left lower leg: Edema present. Comments: Extensive swelling from the left foot up to the patient's mid thigh. +2 to +3 pitting edema. Skin:     General: Skin is warm and dry. Capillary Refill: Capillary refill takes less than 2 seconds. Coloration: Skin is pale. Neurological:      General: No focal deficit present. Mental Status: He is alert and oriented to person, place, and time. Cranial Nerves: No cranial nerve deficit. Sensory: No sensory deficit. Motor: Weakness (Diffuse) present. Coordination: Coordination normal.          Procedures     MDM  Number of Diagnoses or Management Options  Acute deep vein thrombosis (DVT) of proximal vein of left lower extremity ([facility] [address]): Hyponatremia:   Malignant neoplasm of pancreas, unspecified location of malignancy [facility]): Other acute pulmonary embolism, unspecified whether acute cor pulmonale present [facility]):   Pancytopenia [facility]):      Patient presented emergency department for fatigue. Initial differential diagnosis included but is not limited to sepsis, cancer metastasis, PE, DVT, blood stream infection, electrolyte imbalance. Initial suspicion was high for DVT/PE and sepsis. Lab work started coming back and patient was pancytopenic with a white count of 1.5, platelet count of 21 and hemoglobin of 9.1. The hemoglobin is stable for him. Patient's liver enzymes were elevated, most likely secondary from his pancreatic cancer. Ultrasound of the left lower extremity demonstrated extensive clot burden in the left leg. Very high suspicion for pulmonary embolism with patient's tachycardia and newly required oxygen requirement. Because of placements low platelet count and increased risk for bleeding, we did elect to start the patient on heparin with CTA pending.   Patient is also started on IV fluids because of the possibility of sepsis. But fluids were limited to 2 L bolus 2/2 to his possibility of overload and his hyponatremia. CTA demonstrated pulmonary emboli within the segmental and subsegmental arteries of the right upper middle lobes. There is no CT evidence of right heart strain. He also had trace pleural effusions with dependent consolidative opacities within the bilateral lower lobes likely atelectasis versus pneumonia. He has a nonspecific 9 mm nodule in the right upper lobe. Also placed to Dr. Mary Milton who is patient's PCP. He strongly suggest that patient consider going [facility] for further evaluation and treatment. Spoke to the patient and family and they would [facility]. Call placed to [facility] and patient accepted to the ICU but there are no current beds at this time and is on the wait list.  See ED course below for further notes. Ultimately, Dr. Mary Milton admitted the patient and consult placed to ICU for consultation. Decision was made by Dr. Mary Milton to stop the heparin and start the patient on Argatroban secondary to his low platelet count and consults placed to pharmacy about dosing. Patient and family updated multiple times about lab work, imaging, bed status and current treatments. They verbalized understanding and were agreeable with the plan for the patient to be admitted here awaiting bed in [facility]. All questions were answered. Patient was admitted. ED Course as of Feb 07 2213 Fri Feb 07, 2020   1642 Case discussed with Dr. Mary Milton, he knows this patient very well, detailed over given, he does feel the patient might be better served at [facility]. I do discussed this with the patient we are still awaiting his CTA chest however we are starting him on anticoagulation for his DVT. He is willing to go to [facility], states that he goes to the [facility] clinic.     [NC]   9972 4:47 PM  I have signed this patient out to the 0.95 E9/L    Eosinophils Absolute 0.00 (L) 0.05 - 0.50 E9/L    Basophils Absolute 0.00 0.00 - 0.20 E9/L    Anisocytosis 1+     Polychromasia 1+     Poikilocytes 1+     Kannan Cells 1+     Ovalocytes 1+    Basic Metabolic Panel w/ Reflex to MG   Result Value Ref Range    Sodium 127 (L) 132 - 146 mmol/L    Potassium reflex Magnesium 4.2 3.5 - 5.0 mmol/L    Chloride 89 (L) 98 - 107 mmol/L    CO2 24 22 - 29 mmol/L    Anion Gap 14 7 - 16 mmol/L    Glucose 87 74 - 99 mg/dL    BUN 30 (H) 6 - 20 mg/dL    CREATININE 1.2 0.7 - 1.2 mg/dL    GFR Non-African American >60 >=60 mL/min/1.73    GFR African American >60     Calcium 8.1 (L) 8.6 - 10.2 mg/dL   Lactate, Sepsis   Result Value Ref Range    Lactic Acid, Sepsis 2.8 (H) 0.5 - 1.9 mmol/L   Lactate, Sepsis   Result Value Ref Range    Lactic Acid, Sepsis 2.3 (H) 0.5 - 1.9 mmol/L   Lipase   Result Value Ref Range    Lipase 4 (L) 13 - 60 U/L   Hepatic Function Panel   Result Value Ref Range    Total Protein 5.5 (L) 6.4 - 8.3 g/dL    Alb 2.0 (L) 3.5 - 5.2 g/dL    Alkaline Phosphatase 455 (H) 40 - 129 U/L    ALT 36 0 - 40 U/L    AST 70 (H) 0 - 39 U/L    Total Bilirubin 2.0 (H) 0.0 - 1.2 mg/dL    Bilirubin, Direct 1.3 (H) 0.0 - 0.3 mg/dL    Bilirubin, Indirect 0.7 0.0 - 1.0 mg/dL   Troponin   Result Value Ref Range    Troponin <0.01 0.00 - 0.03 ng/mL   Platelet Confirmation   Result Value Ref Range    Platelet Confirmation CONFIRMED    APTT   Result Value Ref Range    aPTT 31.6 24.5 - 35.1 sec   APTT   Result Value Ref Range    aPTT 56.6 (H) 24.5 - 35.1 sec   SPECIMEN REJECTION   Result Value Ref Range    Rejected Test ptt     Reason for Rejection see below    Blood Gas, Arterial   Result Value Ref Range    Date Analyzed 20200207     Time Analyzed 2040     Source: Blood Arterial     pH, Blood Gas 7.521 (H) 7.350 - 7.450    PCO2 27.7 (L) 35.0 - 45.0 mmHg    PO2 109.5 (H) 75.0 - 100.0 mmHg    HCO3 22.2 22.0 - 26.0 mmol/L    B.E. -0.2 -3.0 - 3.0 mmol/L    O2 Sat 97.5 92.0 - 98.5 % as below have been reviewed. BP (!) 93/56   Pulse 84   Temp 97.7 °F (36.5 °C) (Oral)   Resp 22   Ht 5' 10\" (1.778 m)   Wt 204 lb (92.5 kg)   SpO2 95%   BMI 29.27 kg/m²   Oxygen Saturation Interpretation: Normal      ------------------------------------------PROGRESS NOTES -------------------------------------------    ED COURSE MEDICATIONS:                Medications   argatroban infusion 50mg in 0.9% sodium chloride 50 mL (premix) (0.25 mcg/kg/min × 92.5 kg Intravenous New Bag 2/7/20 2147)   0.9 % sodium chloride bolus (0 mLs Intravenous Stopped 2/7/20 1700)   0.9 % sodium chloride bolus (0 mLs Intravenous Stopped 2/7/20 1909)   heparin (porcine) injection 7,400 Units (7,400 Units Intravenous Given 2/7/20 1729)   iopamidol (ISOVUE-370) 76 % injection 80 mL (80 mLs Intravenous Given 2/7/20 1721)   pantoprazole (PROTONIX) injection 80 mg (80 mg Intravenous Given 2/7/20 2147)       CONSULTATIONS:            Consultation:  I Spoke with Dr. Rajiv Guajardo (Medicine). Discussed case. They will admit this patient and will provide consultation. Consultation:  I Spoke with Dr. Ata Stout (intensivist). Discussed case. They will provide consultation. Consultation:  I Spoke with Dr. Paulo Gu (Intensivist at [facility]). Discussed case. They will admit this patient at [facility] when a bed is available. Consultation:  I Spoke with pharmacist. Discussed case. They will provide consultation. Yoel Dior PROCEDURES:            none. COUNSELING:   I have spoken with the daughter and patient and discussed todays results, in addition to providing specific details for the plan of care and counseling regarding the diagnosis and prognosis.     --------------------------------------- IMPRESSION & DISPOSITION --------------------------------     IMPRESSION(s):  1. Other acute pulmonary embolism, unspecified whether acute cor pulmonale present [facility]    2. Malignant neoplasm of pancreas, unspecified location of malignancy ([facility]    3.  Acute deep

## 2020-02-08 PROBLEM — E44.0 MODERATE PROTEIN-CALORIE MALNUTRITION (HCC): Status: ACTIVE | Noted: 2020-01-01
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
## 2020-02-08 NOTE — H&P
Imaging results are as outlined below in the Imaging section of this note. Upon arrival to the ER, patient was 152/67 and tachycardic at 124. The patient received 2L NS, partial heparin in the emergency room and was admitted to ICU under the care of Dr. Raymond Heath and Von Pendleton. Last Hospital Admission - 1/7/20  1. Sepsis secondary to healthcare associated pneumonia, resolved  2. Abdominal pain with evidence of worsening hepatic metastasis, metastatic adenopathy and air within the hepatic and bile ducts with newly identified splenic infarct  3. Chronically elevated troponin  4. Worsening normocytic normochromic anemia on chronic anticoagulation with Eliquis   5. Pancreatic adenocarcinoma, on every other Wednesday chemotherapy  6. History of E. coli septicemia  7. Anxiety  8. Sleep apnea  9. Distant history of DVT and PE, on Eliquis    Last Echocardiogram - 7/9/19   Left ventricular size is grossly normal.   Mild left ventricular concentric hypertrophy noted. Ejection fraction is measured at 78%. No evidence of left ventricular mass or thrombus noted. No regional wall motion abnormalities seen. The left atrium is borderline dilated. Interatrial septum appears intact. No evidence of thrombus within left atrium. Mildly enlarged right atrium size. Physiologic and/or trace mitral regurgitation is present. No mitral valve stenosis present. Physiologic and/or trace tricuspid regurgitation. RVSP is 23.11 mmHg. Regular rhythm. Technically difficult study. No obvious vegetation on tip of catheter seen.      ED TRIAGE VITALS  BP: (!) 90/55, Temp: 99.4 °F (37.4 °C), Pulse: 89, Resp: 25, SpO2: 93 %    Vitals:    02/07/20 1728 02/07/20 1804 02/07/20 1834 02/07/20 1951   BP: (!) 85/50 93/67 (!) 85/54 (!) 90/55   Pulse: 94 92 91 89   Resp: 16 12 20 25   Temp:       TempSrc:       SpO2: 98% 95% 97% 93%   Weight:       Height:             Histories  Past Medical History:   Diagnosis Date    Anticoagulant daily as needed (Constipation)   Yes Historical Provider, MD   influenza virus trivalent vaccine (FLUZONE) injection Inject 0.5 mLs into the muscle once Given @@ [facility]   Yes Historical Provider, MD   albuterol sulfate  (90 Base) MCG/ACT inhaler Inhale 2 puffs into the lungs 4 times daily as needed for Wheezing 1/7/20  Yes RADHA Espana - CNP   escitalopram (LEXAPRO) 10 MG tablet Take 10 mg by mouth daily  10/23/19  Yes Historical Provider, MD   gabapentin (NEURONTIN) 300 MG capsule Take 300 mg by mouth 2 times daily. Yes Historical Provider, MD   LORazepam (ATIVAN) 0.5 MG tablet Take 0.5 mg by mouth 2 times daily as needed for Anxiety. Yes Historical Provider, MD   lidocaine-prilocaine (EMLA) 2.5-2.5 % cream Apply 1 each topically as needed for Pain (Prior to chemotherapy) Apply topically as needed. Yes Historical Provider, MD   senna-docusate (PERICOLACE) 8.6-50 MG per tablet Take 1 tablet by mouth daily as needed for Constipation    Yes Historical Provider, MD   apixaban (ELIQUIS) 5 MG TABS tablet Take 5 mg by mouth 2 times daily   Yes Historical Provider, MD   pantoprazole (PROTONIX) 40 MG tablet Take 40 mg by mouth daily   Yes Historical Provider, MD   promethazine (PHENERGAN) 25 MG tablet Take 25 mg by mouth every 6 hours as needed for Nausea   Yes Historical Provider, MD   diphenoxylate-atropine (LOMOTIL) 2.5-0.025 MG per tablet Take 1 tablet by mouth 4 times daily as needed for Diarrhea. .   Yes Historical Provider, MD   Pancrelipase, Lip-Prot-Amyl, (CREON) 38332 units CPEP Take 2 capsules by mouth 3 times daily (with meals)    Yes Historical Provider, MD       Allergies  Rivaroxaban    Social Hx  Social History     Socioeconomic History    Marital status:      Spouse name: Not on file    Number of children: 2    Years of education: 10    Highest education level: Not on file   Occupational History    Occupation:      Employer: [address] [place] [address] Lymphocytes % 16.5 (L) 20.0 - 42.0 %    Monocytes % 3.4 2.0 - 12.0 %    Eosinophils % 0.0 0.0 - 6.0 %    Basophils % 0.7 0.0 - 2.0 %    Neutrophils Absolute 1.26 (L) 1.80 - 7.30 E9/L    Lymphocytes Absolute 0.26 (L) 1.50 - 4.00 E9/L    Monocytes Absolute 0.00 (L) 0.10 - 0.95 E9/L    Eosinophils Absolute 0.00 (L) 0.05 - 0.50 E9/L    Basophils Absolute 0.00 0.00 - 0.20 E9/L    Anisocytosis 1+     Polychromasia 1+     Poikilocytes 1+     Linden Cells 1+     Ovalocytes 1+    Basic Metabolic Panel w/ Reflex to MG    Collection Time: 02/07/20  3:17 PM   Result Value Ref Range    Sodium 127 (L) 132 - 146 mmol/L    Potassium reflex Magnesium 4.2 3.5 - 5.0 mmol/L    Chloride 89 (L) 98 - 107 mmol/L    CO2 24 22 - 29 mmol/L    Anion Gap 14 7 - 16 mmol/L    Glucose 87 74 - 99 mg/dL    BUN 30 (H) 6 - 20 mg/dL    CREATININE 1.2 0.7 - 1.2 mg/dL    GFR Non-African American >60 >=60 mL/min/1.73    GFR African American >60     Calcium 8.1 (L) 8.6 - 10.2 mg/dL   Lactate, Sepsis    Collection Time: 02/07/20  3:17 PM   Result Value Ref Range    Lactic Acid, Sepsis 2.8 (H) 0.5 - 1.9 mmol/L   Lipase    Collection Time: 02/07/20  3:17 PM   Result Value Ref Range    Lipase 4 (L) 13 - 60 U/L   Hepatic Function Panel    Collection Time: 02/07/20  3:17 PM   Result Value Ref Range    Total Protein 5.5 (L) 6.4 - 8.3 g/dL    Alb 2.0 (L) 3.5 - 5.2 g/dL    Alkaline Phosphatase 455 (H) 40 - 129 U/L    ALT 36 0 - 40 U/L    AST 70 (H) 0 - 39 U/L    Total Bilirubin 2.0 (H) 0.0 - 1.2 mg/dL    Bilirubin, Direct 1.3 (H) 0.0 - 0.3 mg/dL    Bilirubin, Indirect 0.7 0.0 - 1.0 mg/dL   Troponin    Collection Time: 02/07/20  3:17 PM   Result Value Ref Range    Troponin <0.01 0.00 - 0.03 ng/mL   Platelet Confirmation    Collection Time: 02/07/20  3:17 PM   Result Value Ref Range    Platelet Confirmation CONFIRMED    TYPE AND SCREEN    Collection Time: 02/07/20  3:25 PM   Result Value Ref Range    ABO/Rh O POS     Antibody Screen NEG    Rapid influenza A/B antigens Collection Time: 02/07/20  3:50 PM   Result Value Ref Range    Influenza A by PCR Not Detected Not Detected    Influenza B by PCR Not Detected Not Detected   Lactate, Sepsis    Collection Time: 02/07/20  5:30 PM   Result Value Ref Range    Lactic Acid, Sepsis 2.3 (H) 0.5 - 1.9 mmol/L   APTT    Collection Time: 02/07/20  5:30 PM   Result Value Ref Range    aPTT 31.6 24.5 - 35.1 sec   SPECIMEN REJECTION    Collection Time: 02/07/20  8:04 PM   Result Value Ref Range    Rejected Test ptt     Reason for Rejection see below        Imaging  Cta Pulmonary W Contrast    Result Date: 2/7/2020  Reading Location: [facility] [place] Indication: Shortness of breath, hypoxia, stage IV pancreatic cancer Comparison: CT chest from 9/6/2019; CT abdomen from 1/5/2020. Technique: Multidetector CT angiography of the chest was preformed after the administration of intravenous contrast (80 of Isovue-370). Coronal, sagittal, and MIP reformatted images were obtained. Automated dose exposure control was used for this exam. FINDINGS: There is adequate opacification of the pulmonary arteries to the subsegmental level. There are pulmonary artery filling defects within segmental and subsegmental branches within the right upper and middle lobes (series 3, image 49). The main pulmonary artery is normal in caliber. There is no CT evidence of right heart strain. The central airways are patent. There is no bronchiectasis. There is a trace right pleural effusion. There are dependent consolidative opacities within bilateral lower lobes, likely compressive atelectasis versus pneumonia. There is a 9 mm nodular opacity within the right upper lobe (series 5, image 65), which is new compared to the previous study. There is no pneumothorax. A port catheter tip terminates within the right atrium. The heart is normal in size. There is no large pericardial effusion. The thoracic aorta is normal in caliber and enhancement. The great vessel origins are patent.  There is no

## 2020-02-08 NOTE — CONSULTS
Intravenous, Q12H, Alcides Hampton MD    fondaparinux (ARIXTRA) injection 7.5 mg, 7.5 mg, Subcutaneous, Daily, Alcides Hampton MD    Review of Systems:   General: denies weight gain, denies loss of appetite, fever, chills, night sweats. HEENT: denies headaches, dizziness, head trauma, visual changes, eye pain, tinnitus, nosebleeds, hoarseness or throat pain    Respiratory: denies chest pain,+ve dyspnea,but denies cough and hemoptysis  Cardiovascular: denies orthopnea, paroxysmal nocturnal dyspnea, leg swelling, and previous heart attack. Gastrointestinal: denies pain, nausea vomiting, diarrhea, constipation, melena or bleeding. Genitourinary: denies hematuria, frequency, urgency or dysuria  Neurology: denies syncope, seizures, paralysis, paraesthesia   Endocrine: denies polyuria, polydipsia, skin or hair changes, and heat or cold intolerance  Musculoskeletal: denies joint pain, swelling, arthritis or myalgia  Hematologic: denies bleeding, adenopathy and easy bruising  Skin: denies rashes and skin discoloration  Psychiatry: denies depression    Physical Exam:   Vital Signs:  /63   Pulse 109   Temp 97.9 °F (36.6 °C) (Oral)   Resp 30   Ht 5' 10\" (1.778 m)   Wt 198 lb 3.2 oz (89.9 kg)   SpO2 97%   BMI 28.44 kg/m²     Input/Output:   In: 559 [P.O.:50; I.V.:509]  Out: -     Oxygen requirements: RA    Ventilator Information:       General appearance: not in pain or distress,Jaundiced and pale in no respiratory distress    HEENT: Atraumatic/normocephalic, EOMI, OMID, pharynx clear, moist mucosa, redness of the uvula appreciated,   Neck: Supple, no jugular venous distension, lymphadenopathy, thyromegaly or carotid bruits  Chest: Decreased breath sounds, no wheezing, +ve crackles and no tenderness over ribs   Cardiovascular: Normal S1 , S2, regular rate and rhythm, no murmur, rub or gallop  Abdomen: Normal sounds present, soft, lax with no tenderness, +ve ascites  Extremities: No edema. Pulses are equally present. Skin: intact, no rashes   Neurologic: Alert and oriented x 3, No focal deficit     Investigations:  Labs, radiological imaging and cardiac work up were reviewed        ICU STAFF PHYSICIAN NOTE OF PERSONAL INVOLVEMENT IN CARE  As the attending physician, I certify that I personally reviewed the patient's history and personally examined the patient to confirm the physical findings described above, and that I reviewed the relevant imaging studies and available reports. I also discussed the differential diagnosis and all of the proposed management plans with the patient and individuals accompanying the patient to this visit. They had the opportunity to ask questions about the proposed management plans and to have those questions answered. This patient has a high probability of sudden, clinically significant deterioration, which requires the highest level of physician preparedness to intervene urgently. I managed/supervised life or organ supporting interventions that required frequent physician assessment. I devoted my full attention to the direct care of this patient for the amount of time indicated below. Time I spent with family or surrogate(s) is included only if the patient was incapable of providing the necessary information or participating in medical decision-making. Time devoted to teaching and to any procedures I billed separately is not included.   Critical Care Time: 35 minutes      Electronically signed by Paras Cantor MD on 2/8/2020 at 1:43 PM

## 2020-02-08 NOTE — PROGRESS NOTES
Subjective:    Patient is feeling very tired and fatigued. He has no significant dyspnea at rest.  No chest pain. He has mild abdominal discomfort. No nausea or vomiting. He is experiencing diarrhea since he received chemotherapy 3 February. He has swelling the lower extremities. Family was in the room. Objective:    BP 95/60   Pulse 102   Temp 97.9 °F (36.6 °C) (Oral)   Resp 21   Ht 5' 10\" (1.778 m)   Wt 198 lb 3.2 oz (89.9 kg)   SpO2 97%   BMI 28.44 kg/m²     General: Lethargic but not in acute distress. Pale looking. HEENT: No thrush or mucositis, EOMI, PERRLA  Conjunctivae nonjaundiced  Heart:  RRR, no murmurs  Lungs: Poor respiratory effort, no wheeze, rales or rhonchi  Abd:  nontender, nondistended, no masses  Extrem:  No clubbing, cyanosis, bilateral edema more pronounced LLE.   No tenderness on palpation of the calf  Skin: Intact, no petechia or purpura    CBC with Differential:    Lab Results   Component Value Date    WBC 1.7 02/08/2020    RBC 3.10 02/08/2020    HGB 8.4 02/08/2020    HCT 26.1 02/08/2020    PLT 21 02/08/2020    MCV 84.2 02/08/2020    MCH 27.1 02/08/2020    MCHC 32.2 02/08/2020    RDW 18.4 02/08/2020    SEGSPCT 85 12/20/2012    LYMPHOPCT 16.5 02/07/2020    MONOPCT 3.4 02/07/2020    MYELOPCT 2.0 11/19/2018    BASOPCT 0.7 02/07/2020    MONOSABS 0.00 02/07/2020    LYMPHSABS 0.26 02/07/2020    EOSABS 0.00 02/07/2020    BASOSABS 0.00 02/07/2020     CMP:    Lab Results   Component Value Date     02/08/2020    K 3.7 02/08/2020    K 4.2 02/07/2020    CL 96 02/08/2020    CO2 23 02/08/2020    BUN 29 02/08/2020    CREATININE 1.0 02/08/2020    GFRAA >60 02/08/2020    LABGLOM >60 02/08/2020    GLUCOSE 88 02/08/2020    PROT 5.1 02/08/2020    LABALBU 1.8 02/08/2020    CALCIUM 7.4 02/08/2020    BILITOT 1.4 02/08/2020    ALKPHOS 362 02/08/2020    AST 60 02/08/2020    ALT 31 02/08/2020          Current Facility-Administered Medications:     escitalopram (LEXAPRO) tablet 10 mg, 10 mg, Oral, Daily, Amanda Arizmendi DO, 10 mg at 02/08/20 0841    lipase-protease-amylase (CREON) delayed release capsule 72,000 Units, 72,000 Units, Oral, TID WC, Flash Cummings, DO, 72,000 Units at 02/08/20 2691    polyethylene glycol (GLYCOLAX) packet 17 g, 17 g, Oral, Daily PRN, Flash Khannaing, DO    promethazine (PHENERGAN) tablet 25 mg, 25 mg, Oral, Q6H PRN, Flash Khannaing, DO    sennosides-docusate sodium (SENOKOT-S) 8.6-50 MG tablet 1 tablet, 1 tablet, Oral, Daily PRN, Flash Khannaing, DO    gabapentin (NEURONTIN) capsule 300 mg, 300 mg, Oral, BID, Amanda Arizmendi DO, 300 mg at 02/08/20 6515    ibuprofen (ADVIL;MOTRIN) tablet 400 mg, 400 mg, Oral, Q6H PRN, Amanda Arizmendi DO    0.9 % sodium chloride infusion, , Intravenous, Continuous, Amanda Arizmendi DO, Last Rate: 75 mL/hr at 02/08/20 1307    albuterol (PROVENTIL) nebulizer solution 2.5 mg, 2.5 mg, Nebulization, 4x Daily PRN, Flash Cummings DO    sodium chloride flush 0.9 % injection 10 mL, 10 mL, Intravenous, 2 times per day, Tony Arizmendi DO, 10 mL at 02/08/20 3986    sodium chloride flush 0.9 % injection 10 mL, 10 mL, Intravenous, PRN, Flash Cummings DO    sodium chloride flush 0.9 % injection 10 mL, 10 mL, Intravenous, 2 times per day, James Espinoza MD    sodium chloride flush 0.9 % injection 10 mL, 10 mL, Intravenous, PRN, James Espinoza MD    ALPRAZolam Pratik Otoniel) tablet 0.25 mg, 0.25 mg, Oral, TID PRN, James Espinoza MD, 0.25 mg at 02/08/20 1303    pantoprazole (PROTONIX) tablet 40 mg, 40 mg, Oral, BID AC, James Espinoza MD    cefepime (MAXIPIME) 2 g IVPB extended (mini-bag), 2 g, Intravenous, Q12H, Last Rate: 12.5 mL/hr at 02/08/20 1430, 2 g at 02/08/20 1430 **AND** 0.9 % sodium chloride infusion, 12.5 mL, Intravenous, Q12H, James Espinoza MD    fondaparinux (ARIXTRA) injection 7.5 mg, 7.5 mg, Subcutaneous, Daily, Shirley Medeiros Kala Marr MD    Assessment:    Active Problems:    Acute pulmonary embolism without acute cor pulmonale (HCC)  Resolved Problems:    * No resolved hospital problems. *    Patient is 76-year-old man with history of pancreatic cancer who is followed at the [facility] by Dr. Davion Carnes. He was diagnosed with early stage disease to be in August 2018. He underwent Whipple procedure in September 2018. He had adjuvant FOLFIRINOX completed in May 2019. He was started on gemcitabine Abraxane in June 2019 for metastatic disease. Admitted to the hospital early January 2020 for splenic infarct. 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 He is currently on palliative chemotherapy 5-FU, leucovorin. last treatment 2/3/2020  Patient was admitted to the hospital for pneumonia. He stopped Eliquis for > 1month ago for tarry stools, Heme +. He received 2 units of PRBC last month. In the emergency room he was found with left side extensive lower extremity DVT. CTA of the chest showed pulmonary emboli within segmental and subsegmental arteries of the right upper and middle lobes. A new 9 mm nodular opacity within the right upper lobe. 2 blood cultures growing gram-negative rods. Patient is pancytopenic    Plan:  Patient has pulmonary embolism and extensive left lower extremity DVT. Cancer patients, in particular patients with history of pancreatic disease are at increased risk for vascular thrombotic events. Anticoagulation was discontinued last month due to worsening anemia and blood in the stools. Unfortunately it is going to be very difficult to restart anticoagulation especially that he is currently thrombocytopenic. I recommend to avoid anticoagulation as long as platelet count is less than 40. Vascular consult is pending. He is candidate for IVC filter placement.   I am not sure if the procedure can be done soon knowing that he has

## 2020-02-08 NOTE — PROGRESS NOTES
Internal Medicine Progress Note    [facility]. Lucius Farias, & [address] Lucius Farias, F.A.C.O.I. Desiree Dave D.O., JOANAO.I. Primary Care Physician: Chele Robert DO   Admitting Physician:  Chele Robert DO  Admission date and time: 2/7/2020  2:17 PM    Room:  [place] [address]  Admitting diagnosis: Acute pulmonary embolism without acute cor pulmonale, unspecified pulmonary embolism type [facility]) [I26.99]    Patient Name: Katie Mead  MRN: [id number]    Date of Service: 2/8/2020     Subjective:    Luther Silverio is a 63 y. o.  male who was seen and examined today,2/8/2020, at the bedside. The patient seems to be doing somewhat better today. He remains on anticoagulant therapy for treatment of a DVT with sequelae of pulmonary embolism. We are waiting for him to be seen by hematology. Blood cultures are positive at the present time for gram-negative rods. He does seem to be debilitated and depressed. We have discussed CODE STATUS with him and the family    Multiple family present during my examination. Review of System:     Review of Systems  All bolded are positive; please see HPI  General:  Fever, chills, diaphoresis, fatigue, malaise, night sweats, weight loss  Psychological:  Anxiety, disorientation, hallucinations. ENT:  Epistaxis, vertigo, visual changes. Cardiovascular:  Chest pain, irregular heartbeats, palpitations, paroxysmal nocturnal dyspnea. Respiratory:  Shortness of breath, coughing, sputum production, hemoptysis, wheezing, orthopnea.   Gastrointestinal:  Nausea, vomiting, diarrhea, heartburn, constipation, abdominal pain, hematemesis, hematochezia, melena, acholic stools  Genito-Urinary:  Dysuria, urgency, frequency, hematuria  Musculoskeletal:  Joint pain, joint stiffness, joint swelling, muscle pain  Neurology:  Headache, focal neurological deficits, weakness, numbness, paresthesia  Derm:  Rashes, ulcers, excoriations, bruising  Extremities: Decreased ROM, peripheral edema, mottling        Physical Exam:    No intake/output data recorded. Intake/Output Summary (Last 24 hours) at 2/8/2020 1526  Last data filed at 2/8/2020 1410  Gross per 24 hour   Intake 1116.5 ml   Output 400 ml   Net 716.5 ml   I/O last 3 completed shifts: In: 1116.5 [P.O.:50; I.V.:1066.5]  Out: 400 [Urine:400]  Patient Vitals for the past 96 hrs (Last 3 readings):   Weight   02/08/20 0445 198 lb 3.2 oz (89.9 kg)   02/07/20 1419 204 lb (92.5 kg)         Vital Signs:   Blood pressure 95/60, pulse 102, temperature 97.9 °F (36.6 °C), temperature source Oral, resp. rate 21, height 5' 10\" (1.778 m), weight 198 lb 3.2 oz (89.9 kg), SpO2 97 %. Floresita Vincent  who is alert, responsive, oriented to person, place, and time. General Appearance:  awake, alert, and oriented to person, place, time, is chronically ill  HEENT:  NCAT; PERRL; conjunctiva pink, sclera clear  Neck:  no adenopathy, bruit, JVD, tenderness, masses, or nodules; supple, symmetrical, trachea midline, thyroid not enlarged  Lung:  clear to auscultation bilaterally; no use of accessory muscles; no rhonchi, rales, or crackles. Mediport catheter  Heart:  regular rate and regular rhythm without murmur, rub, or gallop  Abdomen:  soft, nontender, nondistended; normoactive bowel sounds; no organomegaly liver surgery  Extremities:  extremities normal, atraumatic, no cyanosis or edema  Musculokeletal:  no joint swelling, no muscle tenderness. ROM normal in all joints of extremities.    Neurologic:  mental status A&Ox3, thought content appropriate; CN II-XII grossly intact; sensation intact, motor strength 5/5 globally; no slurred speech  : Voiding without stein, incontinent            Allergy:  Allergies   Allergen Reactions    Rivaroxaban Other (See Comments)     Other reaction(s): Myalgias (muscle pain)        Medication:  Scheduled Meds:   escitalopram  10 mg Oral Daily    lipase-protease-amylase  72,000 Units Oral TID WC    and intervention. I reviewed the relevant imaging studies and available reports. I also discussed the differential diagnosis and all of the proposed management plans with the patient and individuals accompanying the patient to this visit. I reviewed the relevant imaging studies and available reports. Kip Werner DO, F.A.C.O.I.   On 2/8/2020  3:26 PM

## 2020-02-09 PROBLEM — I26.99 PULMONARY EMBOLISM (HCC): Status: ACTIVE | Noted: 2020-01-01

## 2020-02-09 NOTE — PROGRESS NOTES
Subjective:    Patient is lethargic, somnolent but easy to arouse. He said that he is feeling slightly better compared to yesterday. He has no significant complaint of chest pain or dyspnea. He has no abdominal pain. No nausea or vomiting. Did not have bowel movements for many hours. He has mild discomfort in the left lower extremity. Family described diarrhea overnight and abdominal cramps. Objective:    /63   Pulse 92   Temp 97.7 °F (36.5 °C) (Oral)   Resp 24   Ht 5' 10\" (1.778 m)   Wt 201 lb 9.6 oz (91.4 kg)   SpO2 93%   BMI 28.93 kg/m²     General: Lethargic and somnolent but easy to arouse  Overall gray skin color.    No evidence of respiratory distress    CBC with Differential:    Lab Results   Component Value Date    WBC 1.7 02/09/2020    RBC 2.97 02/09/2020    HGB 8.0 02/09/2020    HCT 25.2 02/09/2020    PLT 30 02/09/2020    MCV 84.8 02/09/2020    MCH 26.9 02/09/2020    MCHC 31.7 02/09/2020    RDW 18.6 02/09/2020    SEGSPCT 85 12/20/2012    LYMPHOPCT 21.0 02/09/2020    MONOPCT 6.0 02/09/2020    MYELOPCT 2.0 11/19/2018    BASOPCT 0.0 02/09/2020    MONOSABS 0.10 02/09/2020    LYMPHSABS 0.36 02/09/2020    EOSABS 0.00 02/09/2020    BASOSABS 0.00 02/09/2020     CMP:    Lab Results   Component Value Date     02/09/2020    K 3.5 02/09/2020    K 4.2 02/07/2020    CL 95 02/09/2020    CO2 23 02/09/2020    BUN 23 02/09/2020    CREATININE 0.9 02/09/2020    GFRAA >60 02/09/2020    LABGLOM >60 02/09/2020    GLUCOSE 108 02/09/2020    PROT 4.9 02/09/2020    LABALBU 1.8 02/09/2020    CALCIUM 7.4 02/09/2020    BILITOT 1.4 02/09/2020    ALKPHOS 329 02/09/2020    AST 50 02/09/2020    ALT 27 02/09/2020              Current Facility-Administered Medications:     bismuth subsalicylate (PEPTO BISMOL) chewable tablet 524 mg, 524 mg, Oral, Q4H PRN, Kip Werner DO, 524 mg at 02/09/20 1140    hyoscyamine (OSCIMIN) dispersible tablet 125 mcg, 125 mcg, Oral, Q4H PRN, Kip Werner DO, 125 mcg at sodium chloride flush 0.9 % injection 10 mL, 10 mL, Intravenous, PRN, Perez Winters MD, 10 mL at 02/09/20 0616    pantoprazole (PROTONIX) tablet 40 mg, 40 mg, Oral, BID AC, Perez Winters MD, 40 mg at 02/09/20 0626    piperacillin-tazobactam (ZOSYN) 3.375 g in dextrose 5 % 50 mL IVPB extended infusion (mini-bag), 3.375 g, Intravenous, Q8H, Stopped at 02/09/20 1149 **AND** 0.9 % sodium chloride infusion, , Intravenous, Q8H, Anu Turner MD, Stopped at 02/09/20 1332    [COMPLETED] anidulafungin (ERAXIS) 200 mg in dextrose 5 % 260 mL IVPB, 200 mg, Intravenous, Once, Stopped at 02/08/20 1824 **AND** anidulafungin (ERAXIS) 100 mg in dextrose 5 % 130 mL IVPB, 100 mg, Intravenous, Q24H, Anu Turner MD, Last Rate: 86.7 mL/hr at 02/09/20 1516, 100 mg at 02/09/20 1516    vancomycin 1000 mg IVPB in 250 mL D5W addavial, 1,000 mg, Intravenous, Q12H, Anu Turner MD, Last Rate: 250 mL/hr at 02/09/20 1510, 1,000 mg at 02/09/20 1510    Tbo-Filgrastim (GRANIX) injection 480 mcg, 480 mcg, Subcutaneous, QPM, Guillermo Orozco MD, 480 mcg at 02/08/20 1819    Assessment:    Active Problems:    Acute pulmonary embolism without acute cor pulmonale (HCC)    Moderate protein-calorie malnutrition ([address]  Resolved Problems:    * No resolved hospital problems. *    Patient is 60-year-old man with history of pancreatic cancer who is followed at the [facility] by Dr. Mary Patel. He was diagnosed with early stage disease to be in August 2018. He underwent Whipple procedure in September 2018. He had adjuvant FOLFIRINOX completed in May 2019. He was started on gemcitabine Abraxane in June 2019 for metastatic disease. Admitted to the hospital early January 2020 for splenic infarct. 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 He is currently on palliative chemotherapy 5-FU, leucovorin.  last treatment 2/3/2020  Patient was

## 2020-02-09 NOTE — PROGRESS NOTES
mottling        Physical Exam:    I/O this shift: In: 770 [I.V.:620; IV Piggyback:150]  Out: -     Intake/Output Summary (Last 24 hours) at 2/9/2020 1446  Last data filed at 2/9/2020 1400  Gross per 24 hour   Intake 2980 ml   Output 550 ml   Net 2430 ml   I/O last 3 completed shifts: In: 2767.5 [P.O.:100; I.V.:1807.5; IV Piggyback:860]  Out: 950 [Urine:950]  Patient Vitals for the past 96 hrs (Last 3 readings):   Weight   02/09/20 0500 201 lb 9.6 oz (91.4 kg)   02/08/20 0445 198 lb 3.2 oz (89.9 kg)   02/07/20 1419 204 lb (92.5 kg)         Vital Signs:   Blood pressure 105/63, pulse 92, temperature 97.7 °F (36.5 °C), temperature source Oral, resp. rate 24, height 5' 10\" (1.778 m), weight 201 lb 9.6 oz (91.4 kg), SpO2 93 %. Domi Yang  who is alert, responsive, oriented to person, place, and time. General Appearance:  awake, alert, and oriented to person, place, time, is chronically ill  HEENT:  NCAT; PERRL; conjunctiva pink, sclera clear  Neck:  no adenopathy, bruit, JVD, tenderness, masses, or nodules; supple, symmetrical, trachea midline, thyroid not enlarged  Lung:  clear to auscultation bilaterally; no use of accessory muscles; no rhonchi, rales, or crackles. Mediport catheter on the right side  Heart:  regular rate and regular rhythm without murmur, rub, or gallop  Abdomen:  soft, nontender, nondistended; normoactive bowel sounds; no organomegaly liver surgery  Extremities:  extremities normal, atraumatic, no cyanosis. Left leg swollen  Musculokeletal:  no joint swelling, no muscle tenderness. ROM normal in all joints of extremities.    Neurologic:  mental status A&Ox3, thought content appropriate; CN II-XII grossly intact; sensation intact, motor strength 5/5 globally; no slurred speech  : Voiding without stein, incontinent            Allergy:  Allergies   Allergen Reactions    Rivaroxaban Other (See Comments)     Other reaction(s): Myalgias (muscle pain)        Medication:  Scheduled Meds:   vancomycin 250 mg Oral 4 times per day    metroNIDAZOLE  500 mg Intravenous Q8H    fondaparinux  7.5 mg Subcutaneous Daily    escitalopram  10 mg Oral Daily    lipase-protease-amylase  72,000 Units Oral TID WC    gabapentin  300 mg Oral BID    sodium chloride flush  10 mL Intravenous 2 times per day    sodium chloride flush  10 mL Intravenous 2 times per day    pantoprazole  40 mg Oral BID AC    piperacillin-tazobactam  3.375 g Intravenous Q8H    anidulafungin  100 mg Intravenous Q24H    vancomycin  1,000 mg Intravenous Q12H    Tbo-Filgrastim  480 mcg Subcutaneous QPM     Continuous Infusions:   sodium chloride 75 mL/hr at 02/09/20 0234    sodium chloride Stopped (02/09/20 1332)       Objective Data:  CBC:   Recent Labs     02/07/20  1517 02/08/20  0459 02/09/20  0615   WBC 1.5* 1.7* 1.7*   HGB 9.1* 8.4* 8.0*   PLT 21* 21* 30*     BMP:    Recent Labs     02/08/20  0114 02/08/20  0459 02/09/20  0615   * 130* 129*   K 3.9 3.7 3.5   CL 93* 96* 95*   CO2 24 23 23   BUN 29* 29* 23*   CREATININE 1.1 1.0 0.9   GLUCOSE 97 88 108*     CMP:    Lab Results   Component Value Date     02/09/2020    K 3.5 02/09/2020    K 4.2 02/07/2020    CL 95 02/09/2020    CO2 23 02/09/2020    BUN 23 02/09/2020    CREATININE 0.9 02/09/2020    GFRAA >60 02/09/2020    LABGLOM >60 02/09/2020    GLUCOSE 108 02/09/2020    PROT 4.9 02/09/2020    LABALBU 1.8 02/09/2020    CALCIUM 7.4 02/09/2020    BILITOT 1.4 02/09/2020    ALKPHOS 329 02/09/2020    AST 50 02/09/2020    ALT 27 02/09/2020     Hepatic:   Recent Labs     02/07/20  1517 02/08/20  0459 02/09/20  0615   AST 70* 60* 50*   ALT 36 31 27   BILITOT 2.0* 1.4* 1.4*   ALKPHOS 455* 362* 329*     Troponin:   Recent Labs     02/07/20  1517 02/08/20  0114 02/08/20  0459   TROPONINI <0.01 <0.01 <0.01     BNP: No results for input(s): BNP in the last 72 hours.   Lipids:   Recent Labs     02/08/20  0459   CHOL 61   HDL 17     ABGs: No results found for: PHART, PO2ART, CTM1QEZ  INR: No results difficile  · Gram negative bacteremia  · Anxiety  · Sleep apnea      Plan:     The patient remained stable despite his multiple comorbidities. He has  complaints of  persistent large volume diarrhea which is present. Stools came  back positive for C. Difficile. The patient has been instituted on oral vancomycin and Flagyl. Patient has been given Pepto-Bismol also. Blood cultures are positive for gram-negative rods and is currently receiving antibiotic therapy. Debatable whether or not he should receive a IVC filter due to the septicemia and consider removing  the Mediport. The patient has been resumed back on anticoagulant therapy despite the evidence of thrombocytopenia. The patient prefers switching back to Ativan for his anxiety  He has changed his CODE STATUS to DNR CCA  Continues to  follow-up of multiple subspecialists. Prognosis guarded and poor    30 minutes of critical care time was spent with the patient. This includes chart review, , reviewing rhythm strips, and discussion with those consultants involved in the patient's care. I reviewed the patient's past medical, surgical history and medication. Patient's medications were reviewed/continued/adjusted. Labs as ordered. Please see orders for further plan of care. Rhythm strips reviewed as well as consultant recommendations/notes and/or discussion. I reviewed the  course of events since last visit. More than 50% of my  time was spent at the bedside counseling and/or coordination of care with the patient and/or family with face to face contact. This time was spent reviewing notes and laboratory data, instructing and counseling the patient.  Time I spent with the family or surrogate(s) is included only if the patient was incapable of providing the necessary information or participating in medical decisionsI also discussed the differential diagnosis and all of the proposed management plans with the patient and individuals accompanying the patient. David Son requires this high level of physician care and nursing in the ICU due the complexity of decision management and chance of rapid decline or death. Continued cardiac monitoring and higher level of nursing are required. I am ready available for decision making and intervention. I reviewed the relevant imaging studies and available reports. I also discussed the differential diagnosis and all of the proposed management plans with the patient and individuals accompanying the patient to this visit. I reviewed the relevant imaging studies and available reports. Kip Werner DO, F.A.C.O.I.   On 2/9/2020  2:46 PM

## 2020-02-09 NOTE — PROGRESS NOTES
Assessment and Plan  Patient is a 63 y.o. male with the following medical Problems:   1. Segmental and subsegmental PE in the RUL and RML  2. Small right-sided pleural effusion  3. Use of DVT of the left lower limb  4. History of DVT and PE (anticoagulation due to presumed upper GI bleeding)  5. Stage IV pancreatic cancer (status post chemotherapy, last chemotherapy one week ago)  6. Gram-negative bacteremia likely secondary to SBP  7. Pancytopenia  8. Cirrhosis  9. Anxiety  10. Sleep apnea  11. Hyponatremia    Plan of care:  1 patient is thrombocytopenic likely secondary to chemotherapy and cirrhosis. His INR is 2.0. We will switch Argatroban  to Arixtra once his PTT is within normal ranges. Patient is high risk of bleeding regardless of the mode of anticoagulation use. Risk and benefits of anticoagulation were discussed with the patient and he is agreeable to proceed with Arixtra 7.5 mg once a day. 2 neutropenic precaution  3. Neutropenic diet  4. Protonix twice a day  5. ID is managing antibiotics  6. We will consider diagnostic paracentesis at some point if possible to identify the source of sepsis. Patient is high risk of bleeding due to therapeutic PTT and thrombocytopenia as well as elevated INR  7. I discussed the goals of care with the patient and his family. He change his CODE STATUS to DNR CCA. History of Present Illness:   Patient is a 61-year-old man with above-mentioned medical problem who presented to the emergency room on February 6, 2020 with fatigue and weakness. Work-up revealed right upper lobe PE and left occlusive DVT. His blood culture grew gram-negative bacilli. Patient is feeling better today and denies fever, chills, rigors. He is hemodynamically stable. 02/09/2020  Patient remained stable throughout his stay. He has no fever, chills,or  rigors. He change his CODE STATUS to DNR CCA.   I explained to the patient the risk and benefits of anticoagulation Intravenous, Q12H, Myron Read MD, Stopped at 02/09/20 0445    Tbo-Filgrastim (GRANIX) injection 480 mcg, 480 mcg, Subcutaneous, QPM, Cele Garcia MD, 480 mcg at 02/08/20 1819    Review of Systems:   General: denies weight gain, denies loss of appetite, fever, chills, night sweats. HEENT: denies headaches, dizziness, head trauma, visual changes, eye pain, tinnitus, nosebleeds, hoarseness or throat pain    Respiratory: denies chest pain,+ve dyspnea,but denies cough and hemoptysis  Cardiovascular: denies orthopnea, paroxysmal nocturnal dyspnea, leg swelling, and previous heart attack. Gastrointestinal: denies pain, nausea vomiting, diarrhea, constipation, melena or bleeding. Genitourinary: denies hematuria, frequency, urgency or dysuria  Neurology: denies syncope, seizures, paralysis, paraesthesia   Endocrine: denies polyuria, polydipsia, skin or hair changes, and heat or cold intolerance  Musculoskeletal: denies joint pain, swelling, arthritis or myalgia  Hematologic: denies bleeding, adenopathy and easy bruising  Skin: denies rashes and skin discoloration  Psychiatry: denies depression    Physical Exam:   Vital Signs:  /63   Pulse 92   Temp 97.7 °F (36.5 °C) (Oral)   Resp 24   Ht 5' 10\" (1.778 m)   Wt 201 lb 9.6 oz (91.4 kg)   SpO2 93%   BMI 28.93 kg/m²     Input/Output:  In: 6626 [P.O.:100;  I.V.:1250]  Out: 550     Oxygen requirements: RA    Ventilator Information:       General appearance: ill looking, not in pain or distress,Jaundiced and pale in no respiratory distress    HEENT: Atraumatic/normocephalic, EOMI, OMID, pharynx clear, moist mucosa, redness of the uvula appreciated,   Neck: Supple, no jugular venous distension, lymphadenopathy, thyromegaly or carotid bruits  Chest: Decreased breath sounds, no wheezing, +ve crackles and no tenderness over ribs   Cardiovascular: Normal S1 , S2, regular rate and rhythm, no murmur, rub or gallop  Abdomen: Normal sounds present, soft, lax with

## 2020-02-10 NOTE — PLAN OF CARE
Problem: Falls - Risk of:  Goal: Will remain free from falls  Description  Will remain free from falls  2/10/2020 1444 by Cheryl Ramos RN  Outcome: Met This Shift  2/10/2020 0546 by Adria Foster RN  Outcome: Met This Shift  Goal: Absence of physical injury  Description  Absence of physical injury  2/10/2020 1444 by Cheryl Ramos RN  Outcome: Met This Shift  2/10/2020 0546 by Adria Foster RN  Outcome: Met This Shift

## 2020-02-10 NOTE — PROGRESS NOTES
CRITICAL CARE PROGRESS NOTE    The patient's case was discussed in multidisciplinary rounds including critical care specialist, nursing, RT and pharmacy. His evaluation is as follows:     --63year old man with PMH of  pancreatic cancer, cirrhosis, admitted to ICU for management of Acinetobacter bacteremia, segmental and subsegmental PE in the RUL and RML and pancytopenia.    --The patient has not improved clinically, oncology recommends palliative care and comfort measures, his family agrees to take him home with hospice as the patient would not like to die in the hospital.     Recent Labs     02/10/20  0510   *   K 3.2*   CL 97*   CO2 22   BUN 22*   CREATININE 0.9   GLUCOSE 109*   CALCIUM 7.2*     Recent Labs     02/10/20  0510   WBC 1.3*   RBC 2.85*   HGB 7.8*   HCT 24.7*   MCV 86.7   MCH 27.4   MCHC 31.6*   RDW 18.6*   PLT 38*   MPV NOT CALC     Recent Labs     02/07/20  1538   BC Gram stain performed from blood culture bottle media  Gram negative rods  *  Identification and sensitivity to follow       Recent Labs     02/07/20  1730   BLOODCULT2 Gram stain performed from blood culture bottle media  Gram negative rods  Previously positive blood culture called  *  Identification and sensitivity to follow  Refer to previous culture for susceptibility results  of CXBL collected 02/07/2020 at 15:38         24 HR INTAKE/OUTPUT:      Intake/Output Summary (Last 24 hours) at 2/10/2020 1337  Last data filed at 2/10/2020 0516  Gross per 24 hour   Intake 1734 ml   Output 800 ml   Net 934 ml   BP (!) 152/115   Pulse 97   Temp 97 °F (36.1 °C) (Oral)   Resp 25   Ht 5' 10\" (1.778 m)   Wt 201 lb 9.6 oz (91.4 kg)   SpO2 96%   BMI 28.93 kg/m²   General: Somnolent, oriented to person  HEENT: No head lesions, PERRL, EOMI, mouth without lesions, no nasal lesions, no cervical adenopathy palpated  Respiratory: Lungs with fair breath sounds bilaterally, no adventitious sounds auscultated, no accessory muscle use  CV: Regular rate, no murmurs, no JVD, no leg edema  Abdomen: Soft, non tender, + bowel sounds, no lesions  Skin: Hydrated, adequate turgor, no rash, capillary refill <2 seconds  Extremities: Muscular strength 3/5 in 4 limbs, moves 4 limbs spontaneously, distal pulses present  Neurology: Follows commands, moves 4 limbs on command and spontaneously, neck is supple, no meningitic signs present. A/P:  1) Sepsis due to Acinetobacter bacteremia and C. Diff colitis in a patient with pancytopenia with neutropenia, stage IV pancreatic cancer. --The patient's treatment now focused on comfort, oxycodone added to analgesia regimen  --Continue benzodiazepines PRN  --Hospice consulted and plan to transfer patient home this afternoon   --Blood draws stopped, he will be discharged with oral vancomycin to control diarrhea while at home.        Evelina Huizar MD  Pulmonary and Critical Care Medicine

## 2020-02-10 NOTE — DISCHARGE SUMMARY
Internal Medicine  Discharge Summary    NAME: Zuleyka Lea  :  1960  MRN:  [id number]  PCP:Kip Werner DO  ADMITTED: 2020      DISCHARGED: 2/10/20    ADMITTING PHYSICIAN: Adela Werner DO    CONSULTANT(S):   IP CONSULT TO ONCOLOGY  IP CONSULT TO INFECTIOUS DISEASES  IP CONSULT TO SOCIAL WORK  IP CONSULT TO SOCIAL WORK  IP CONSULT TO HOSPICE  IP CONSULT TO HOSPICE     ADMITTING DIAGNOSIS:   Acute pulmonary embolism without acute cor pulmonale, unspecified pulmonary embolism type ([facility] [address] [I26.99]     DISCHARGE DIAGNOSES:   1. Multifocal pulmonary emboli resulting in acute respiratory failure with hypoxia  2. Occlusive DVT of left lower extremity   3. Sepsis (POA) secondary to C. difficile infection with concomitant gram-negative bacteremia  4. Recent discontinuation of Eliquis due to dark stools  5. Metastatic pancreatic adenocarcinoma with evidence of worsening hepatic metastasis--Stage IV  6. Pancytopenia secondary to chemotherapy  7. Generalized anxiety  8. Sleep apnea    BRIEF HISTORY OF PRESENT ILLNESS:   Zuleyka Lea is a 63 y.o. male who presents to [facility] ER complaining of fatigue.     Zuleyka Lea has a past medical history that includes pancreatic adenocarcinoma, anxiety, sleep apnea, history of DVT and PE.     Sam Trujillo presented to the ER with main complaints of fatigue. He does have stage IV pancreatic cancer and is complaining of general weakness along with left leg swelling. He was recently admitted for sepsis secondary to pneumonia, splenic infarct and was discharged on . He states that his fatigue has increased since discharge. He says that his leg swelling has been worsening over the past 5 days. He does have a history of DVT and PE in the past.  He was previously on Eliquis but this was recently stopped secondary to darkening stools.   In the ER he was found to have left-sided extensive lower extremity DVT along with pulmonary emboli within the segmental and subsegmental Date: 2/7/2020  Reading Location: 200 Indication: Shortness of breath, hypoxia, stage IV pancreatic cancer Comparison: CT chest from 9/6/2019; CT abdomen from 1/5/2020. Technique: Multidetector CT angiography of the chest was preformed after the administration of intravenous contrast (80 of Isovue-370). Coronal, sagittal, and MIP reformatted images were obtained. Automated dose exposure control was used for this exam. FINDINGS: There is adequate opacification of the pulmonary arteries to the subsegmental level. There are pulmonary artery filling defects within segmental and subsegmental branches within the right upper and middle lobes (series 3, image 49). The main pulmonary artery is normal in caliber. There is no CT evidence of right heart strain. The central airways are patent. There is no bronchiectasis. There is a trace right pleural effusion. There are dependent consolidative opacities within bilateral lower lobes, likely compressive atelectasis versus pneumonia. There is a 9 mm nodular opacity within the right upper lobe (series 5, image 65), which is new compared to the previous study. There is no pneumothorax. A port catheter tip terminates within the right atrium. The heart is normal in size. There is no large pericardial effusion. The thoracic aorta is normal in caliber and enhancement. The great vessel origins are patent. There is no mediastinal or hilar lymphadenopathy. There is no axillary lymphadenopathy. Visualized portion of the upper abdomen demonstrates multiple ill-defined hypodense metastatic lesions throughout the liver. There is ascites. There is no suspicious lytic or blastic osseous lesion. 1. Pulmonary emboli within segmental and subsegmental arteries of the right upper and middle lobes. No CT evidence of right heart strain. 2. Trace right pleural effusion with dependent consolidative opacities within bilateral lower lobes, likely compressive atelectasis versus pneumonia.  3. Nonspecific

## 2020-02-10 NOTE — PROGRESS NOTES
[facility] call from [facility] to update me that they will be approx 30min late for pick-up. Family has been updated on this new information.

## 2020-02-10 NOTE — PROGRESS NOTES
Internal Medicine Progress Note    Jada Malu. Nhung Avendano., & [address] Nhung Avendano., F.A.C.O.I. Marty Bell D.O., [email or web address]. Primary Care Physician: Lora Dubon DO   Admitting Physician:  Lora Dubon DO  Admission date and time: 2/7/2020  2:17 PM    Room:  [place] [address]    Patient Name: Armando Dee  MRN: [id number]    Date of Service: 2/10/2020     Subjective:  Blanquita Pineda was seen and evaluated in the presence of his daughter today. He remains profoundly weak and deconditioned but is coherent. He understands the gravity of the situation and realizes that his prognosis is poor. He has a fear of dying in the hospital and would like to return home with hospice. We discussed the different options available. His diarrhea seems to have slowed at this point. He denies any overt pain or discomfort during my examination. His daughter was present and multiple questions were answered at the bedside. Review of System: HEENT:  Randall ear pain, sore throat, sinus or eye problems  Cardiovascular:   Denies any chest pain, irregular heartbeats, or palpitations. Respiratory:   Admits to generalized shortness of breath. He has no significant coughing or sputum production. Gastrointestinal:   Admits to abdominal distention. No nausea or vomiting. States his diarrhea is slowing. Extremities:   Denies any lower extremity swelling or edema. Neurology:    Denies any headache or focal neurological deficits. Profound weakness and deconditioning. Derm:    Denies any rashes, ulcers, or excoriations. Denies bruising. Genitourinary:    Denies any urgency, frequency, hematuria. Voiding without difficulty. Musculoskeletal:  Admits to diffuse myalgias and joint complaints. Physical Exam:  No intake/output data recorded. Blood pressure (!) 95/51, pulse 89, temperature 97.2 °F (36.2 °C), temperature source Oral, resp.  rate (!) 32, height 5' 10\" (1.778 m), weight 201 lb

## 2020-02-10 NOTE — PROGRESS NOTES
Valentine from [facility] here to speak with patient and family. At this time I have been informed this patient will go home with hospice care today.

## 2021-05-02 NOTE — PROGRESS NOTES
RX ED Progress Note: Vancomycin Consult Acceptance      Indication for therapy: Sepsis    ALLERGIES:  No Known Allergies    Most recent height and weight information:  Weight: 72 kg (05/01/21 2020)       The Following are the calculated  Current Weights for Wallace Mane            Adjusted Ideal    None None             Labs:  Serum Creatinine and Creatinine Clearance:  Creatinine clearance cannot be calculated (Patient's most recent lab result is older than the maximum 7 days allowed.)    Maximum Temperature (last 24 hours)     Value Max    Temp  98.6 °F (37 °C)        WBC (K/mcL)   Date/Time Value   05/01/2021 2004 9.0     Microbiology Results     None            Assessment/Plan:  Briefly, this is a 80 year old male started on vancomycin for Sepsis, indicated for one time vancomycin dose in the Emergency department. Initial dose will be Vancomycin 1500 mg once.    If vancomycin therapy is appropriate to be resumed, the inpatient provider will need to order a Pharmacy to dose and monitor vancomycin therapy consult if pharmacist monitoring of pertinent labs and drug levels is desired.    Thank you,    Marianna Marin, PHARMD  5/1/2021 8:32 PM     02/08/2020 Methicillin resistant Staph aureus not isolated  Final     No results found for: CULTRESP  Recent Labs     02/07/20  1538 02/07/20  1730 02/08/20  1800   ORG Gram negative alton* Gram negative alton* Toxigenic C. difficile by DNA amplification*     Recent Labs     02/08/20  1800   ORG Toxigenic C. difficile by DNA amplification*     Recent Labs     02/07/20  1538 02/07/20  1730 02/08/20  1800   ORG Gram negative alton* Gram negative alton* Toxigenic C. difficile by DNA amplification*     Recent Labs     02/07/20  1538 02/07/20  1730 02/08/20  0850 02/08/20  1800   LABURIN  --   --  24 Hours- growth not present; incubation continues  --    ORG Gram negative alton* Gram negative alton*  --  Toxigenic C. difficile by DNA amplification*          ASSESSMENT/PLAN:    Gn sepsis  Cdiff  Pancytopenia  Pancreatic ca on chemo  PE/dvt  -piptazo  -flagyl  -vanco po/IV  -eraxis  · Monitor labs    Imaging and labs were reviewed per medical records. The patient was educated about the diagnosis, prognosis, indications, risks and benefits of treatment. An opportunity to ask questions was given to the patient/FAMILY.       Electronically signed by Amilcar Hall MD on 2/9/2020 at 6:04 PM

## (undated) DEVICE — MARKER,SKIN,WI/RULER AND LABELS: Brand: MEDLINE

## (undated) DEVICE — GOWN,SIRUS,NONRNF,SETINSLV,XL,20/CS: Brand: MEDLINE